# Patient Record
Sex: FEMALE | Race: WHITE | Employment: PART TIME | URBAN - METROPOLITAN AREA
[De-identification: names, ages, dates, MRNs, and addresses within clinical notes are randomized per-mention and may not be internally consistent; named-entity substitution may affect disease eponyms.]

---

## 2017-01-09 RX ORDER — TOPIRAMATE 100 MG/1
100 CAPSULE, EXTENDED RELEASE ORAL DAILY
Qty: 90 CAPSULE | Refills: 3 | Status: SHIPPED | OUTPATIENT
Start: 2017-01-09 | End: 2017-01-26

## 2017-01-24 ENCOUNTER — PATIENT MESSAGE (OUTPATIENT)
Dept: FAMILY MEDICINE CLINIC | Age: 57
End: 2017-01-24

## 2017-01-26 DIAGNOSIS — G43.009 MIGRAINE WITHOUT AURA AND WITHOUT STATUS MIGRAINOSUS, NOT INTRACTABLE: ICD-10-CM

## 2017-01-26 RX ORDER — TOPIRAMATE 100 MG/1
100 TABLET, FILM COATED ORAL DAILY
Qty: 30 TABLET | Refills: 5 | Status: SHIPPED | OUTPATIENT
Start: 2017-01-26 | End: 2017-06-20 | Stop reason: SDUPTHER

## 2017-01-27 RX ORDER — LEVOTHYROXINE SODIUM 0.05 MG/1
TABLET ORAL
Qty: 90 TABLET | Refills: 1 | Status: SHIPPED | OUTPATIENT
Start: 2017-01-27 | End: 2017-10-02 | Stop reason: SDUPTHER

## 2017-01-27 RX ORDER — TRAMADOL HYDROCHLORIDE 50 MG/1
TABLET ORAL
Qty: 90 TABLET | Refills: 0 | OUTPATIENT
Start: 2017-01-27

## 2017-04-24 RX ORDER — PROPRANOLOL HCL 60 MG
CAPSULE, EXTENDED RELEASE 24HR ORAL
Qty: 90 CAPSULE | Refills: 0 | Status: SHIPPED | OUTPATIENT
Start: 2017-04-24 | End: 2017-10-04 | Stop reason: SDUPTHER

## 2017-05-12 RX ORDER — SUMATRIPTAN 100 MG/1
TABLET, FILM COATED ORAL
Qty: 9 TABLET | Refills: 3 | Status: SHIPPED | OUTPATIENT
Start: 2017-05-12 | End: 2017-10-04 | Stop reason: SDUPTHER

## 2017-05-18 ENCOUNTER — OFFICE VISIT (OUTPATIENT)
Dept: FAMILY MEDICINE CLINIC | Age: 57
End: 2017-05-18

## 2017-05-18 VITALS
OXYGEN SATURATION: 96 % | BODY MASS INDEX: 38.45 KG/M2 | DIASTOLIC BLOOD PRESSURE: 75 MMHG | WEIGHT: 245 LBS | RESPIRATION RATE: 16 BRPM | HEART RATE: 87 BPM | HEIGHT: 67 IN | SYSTOLIC BLOOD PRESSURE: 133 MMHG | TEMPERATURE: 97 F

## 2017-05-18 DIAGNOSIS — J20.9 BRONCHOSPASM WITH BRONCHITIS, ACUTE: Primary | ICD-10-CM

## 2017-05-18 DIAGNOSIS — H65.192 OTITIS MEDIA, ACUTE NONSUPPURATIVE, LEFT: ICD-10-CM

## 2017-05-18 PROCEDURE — 99213 OFFICE O/P EST LOW 20 MIN: CPT | Performed by: FAMILY MEDICINE

## 2017-05-18 RX ORDER — ALBUTEROL SULFATE 90 UG/1
2 AEROSOL, METERED RESPIRATORY (INHALATION) EVERY 4 HOURS PRN
Qty: 1 INHALER | Refills: 0 | Status: SHIPPED | OUTPATIENT
Start: 2017-05-18 | End: 2017-07-01 | Stop reason: SDUPTHER

## 2017-05-18 RX ORDER — BENZONATATE 200 MG/1
200 CAPSULE ORAL 3 TIMES DAILY PRN
Qty: 30 CAPSULE | Refills: 0 | Status: SHIPPED | OUTPATIENT
Start: 2017-05-18 | End: 2017-05-25

## 2017-05-18 RX ORDER — AZITHROMYCIN 250 MG/1
TABLET, FILM COATED ORAL
Qty: 1 PACKET | Refills: 0 | Status: SHIPPED | OUTPATIENT
Start: 2017-05-18 | End: 2017-05-28

## 2017-05-18 ASSESSMENT — PATIENT HEALTH QUESTIONNAIRE - PHQ9
2. FEELING DOWN, DEPRESSED OR HOPELESS: 0
SUM OF ALL RESPONSES TO PHQ9 QUESTIONS 1 & 2: 0
1. LITTLE INTEREST OR PLEASURE IN DOING THINGS: 0
SUM OF ALL RESPONSES TO PHQ QUESTIONS 1-9: 0

## 2017-05-23 ENCOUNTER — TELEPHONE (OUTPATIENT)
Dept: FAMILY MEDICINE CLINIC | Age: 57
End: 2017-05-23

## 2017-05-23 ENCOUNTER — OFFICE VISIT (OUTPATIENT)
Dept: FAMILY MEDICINE CLINIC | Age: 57
End: 2017-05-23

## 2017-05-23 VITALS
HEART RATE: 72 BPM | WEIGHT: 245 LBS | OXYGEN SATURATION: 94 % | BODY MASS INDEX: 38.95 KG/M2 | DIASTOLIC BLOOD PRESSURE: 76 MMHG | SYSTOLIC BLOOD PRESSURE: 132 MMHG | TEMPERATURE: 97.2 F | RESPIRATION RATE: 20 BRPM

## 2017-05-23 DIAGNOSIS — H65.02 ACUTE SEROUS OTITIS MEDIA OF LEFT EAR, RECURRENCE NOT SPECIFIED: ICD-10-CM

## 2017-05-23 DIAGNOSIS — H69.83 ETD (EUSTACHIAN TUBE DYSFUNCTION), BILATERAL: ICD-10-CM

## 2017-05-23 DIAGNOSIS — J20.9 ACUTE BRONCHITIS, UNSPECIFIED ORGANISM: Primary | ICD-10-CM

## 2017-05-23 DIAGNOSIS — H93.8X3 EAR FULLNESS, BILATERAL: ICD-10-CM

## 2017-05-23 PROCEDURE — 99214 OFFICE O/P EST MOD 30 MIN: CPT | Performed by: FAMILY MEDICINE

## 2017-05-23 RX ORDER — PREDNISONE 20 MG/1
TABLET ORAL
Qty: 18 TABLET | Refills: 0 | Status: SHIPPED | OUTPATIENT
Start: 2017-05-23 | End: 2017-06-02

## 2017-05-23 RX ORDER — AMOXICILLIN AND CLAVULANATE POTASSIUM 875; 125 MG/1; MG/1
1 TABLET, FILM COATED ORAL 2 TIMES DAILY
Qty: 20 TABLET | Refills: 0 | Status: SHIPPED | OUTPATIENT
Start: 2017-05-23 | End: 2017-06-02

## 2017-05-23 RX ORDER — FLUCONAZOLE 150 MG/1
150 TABLET ORAL ONCE
Qty: 1 TABLET | Refills: 0 | Status: SHIPPED | OUTPATIENT
Start: 2017-05-23 | End: 2017-05-23

## 2017-05-31 ENCOUNTER — TELEPHONE (OUTPATIENT)
Dept: FAMILY MEDICINE CLINIC | Age: 57
End: 2017-05-31

## 2017-06-26 ENCOUNTER — OFFICE VISIT (OUTPATIENT)
Dept: FAMILY MEDICINE CLINIC | Age: 57
End: 2017-06-26

## 2017-06-26 VITALS
OXYGEN SATURATION: 95 % | DIASTOLIC BLOOD PRESSURE: 73 MMHG | SYSTOLIC BLOOD PRESSURE: 133 MMHG | WEIGHT: 244.6 LBS | BODY MASS INDEX: 38.89 KG/M2 | TEMPERATURE: 97.8 F | HEART RATE: 78 BPM | RESPIRATION RATE: 16 BRPM

## 2017-06-26 DIAGNOSIS — R09.89 CHEST CONGESTION: ICD-10-CM

## 2017-06-26 DIAGNOSIS — J30.2 SEASONAL ALLERGIC RHINITIS, UNSPECIFIED ALLERGIC RHINITIS TRIGGER: ICD-10-CM

## 2017-06-26 DIAGNOSIS — H93.8X2 EAR FULLNESS, LEFT: ICD-10-CM

## 2017-06-26 DIAGNOSIS — J45.21 RAD (REACTIVE AIRWAY DISEASE), MILD INTERMITTENT, WITH ACUTE EXACERBATION: ICD-10-CM

## 2017-06-26 DIAGNOSIS — J01.01 ACUTE RECURRENT MAXILLARY SINUSITIS: Primary | ICD-10-CM

## 2017-06-26 PROCEDURE — 99214 OFFICE O/P EST MOD 30 MIN: CPT | Performed by: FAMILY MEDICINE

## 2017-06-26 RX ORDER — LEVOFLOXACIN 500 MG/1
500 TABLET, FILM COATED ORAL DAILY
Qty: 20 TABLET | Refills: 0 | Status: SHIPPED | OUTPATIENT
Start: 2017-06-26 | End: 2017-07-16

## 2017-07-01 DIAGNOSIS — J20.9 BRONCHOSPASM WITH BRONCHITIS, ACUTE: ICD-10-CM

## 2017-09-28 ENCOUNTER — OFFICE VISIT (OUTPATIENT)
Dept: FAMILY MEDICINE CLINIC | Age: 57
End: 2017-09-28

## 2017-09-28 VITALS
HEIGHT: 66 IN | OXYGEN SATURATION: 97 % | SYSTOLIC BLOOD PRESSURE: 132 MMHG | TEMPERATURE: 97.3 F | HEART RATE: 73 BPM | DIASTOLIC BLOOD PRESSURE: 70 MMHG | WEIGHT: 243 LBS | BODY MASS INDEX: 39.05 KG/M2

## 2017-09-28 DIAGNOSIS — F33.42 RECURRENT MAJOR DEPRESSIVE DISORDER, IN FULL REMISSION (HCC): ICD-10-CM

## 2017-09-28 DIAGNOSIS — G43.009 MIGRAINE WITHOUT AURA AND WITHOUT STATUS MIGRAINOSUS, NOT INTRACTABLE: ICD-10-CM

## 2017-09-28 DIAGNOSIS — Z00.00 ROUTINE GENERAL MEDICAL EXAMINATION AT A HEALTH CARE FACILITY: Primary | ICD-10-CM

## 2017-09-28 DIAGNOSIS — K21.9 GASTROESOPHAGEAL REFLUX DISEASE, ESOPHAGITIS PRESENCE NOT SPECIFIED: ICD-10-CM

## 2017-09-28 DIAGNOSIS — G89.29 CHRONIC LEFT SHOULDER PAIN: ICD-10-CM

## 2017-09-28 DIAGNOSIS — E78.00 HYPERCHOLESTEREMIA: ICD-10-CM

## 2017-09-28 DIAGNOSIS — E03.9 HYPOTHYROIDISM, UNSPECIFIED TYPE: ICD-10-CM

## 2017-09-28 DIAGNOSIS — R60.0 LOCALIZED EDEMA: ICD-10-CM

## 2017-09-28 DIAGNOSIS — R73.9 HYPERGLYCEMIA: ICD-10-CM

## 2017-09-28 DIAGNOSIS — N39.3 STRESS INCONTINENCE: ICD-10-CM

## 2017-09-28 DIAGNOSIS — Z12.39 SCREENING FOR BREAST CANCER: ICD-10-CM

## 2017-09-28 DIAGNOSIS — Z23 NEED FOR PNEUMOCOCCAL VACCINATION: ICD-10-CM

## 2017-09-28 DIAGNOSIS — M25.512 CHRONIC LEFT SHOULDER PAIN: ICD-10-CM

## 2017-09-28 DIAGNOSIS — J45.991 COUGH VARIANT ASTHMA: ICD-10-CM

## 2017-09-28 LAB
A/G RATIO: 1.7 (ref 1.1–2.2)
ALBUMIN SERPL-MCNC: 4.5 G/DL (ref 3.4–5)
ALP BLD-CCNC: 73 U/L (ref 40–129)
ALT SERPL-CCNC: 22 U/L (ref 10–40)
ANION GAP SERPL CALCULATED.3IONS-SCNC: 20 MMOL/L (ref 3–16)
AST SERPL-CCNC: 22 U/L (ref 15–37)
BILIRUB SERPL-MCNC: 0.6 MG/DL (ref 0–1)
BUN BLDV-MCNC: 11 MG/DL (ref 7–20)
CALCIUM SERPL-MCNC: 9.9 MG/DL (ref 8.3–10.6)
CHLORIDE BLD-SCNC: 100 MMOL/L (ref 99–110)
CHOLESTEROL, TOTAL: 168 MG/DL (ref 0–199)
CO2: 21 MMOL/L (ref 21–32)
CREAT SERPL-MCNC: 1 MG/DL (ref 0.6–1.1)
GFR AFRICAN AMERICAN: >60
GFR NON-AFRICAN AMERICAN: 57
GLOBULIN: 2.7 G/DL
GLUCOSE BLD-MCNC: 107 MG/DL (ref 70–99)
HCT VFR BLD CALC: 45.6 % (ref 36–48)
HDLC SERPL-MCNC: 64 MG/DL (ref 40–60)
HEMOGLOBIN: 15.1 G/DL (ref 12–16)
LDL CHOLESTEROL CALCULATED: 88 MG/DL
MCH RBC QN AUTO: 30.8 PG (ref 26–34)
MCHC RBC AUTO-ENTMCNC: 33 G/DL (ref 31–36)
MCV RBC AUTO: 93.4 FL (ref 80–100)
PDW BLD-RTO: 13.9 % (ref 12.4–15.4)
PLATELET # BLD: 189 K/UL (ref 135–450)
PMV BLD AUTO: 11.1 FL (ref 5–10.5)
POTASSIUM SERPL-SCNC: 3.2 MMOL/L (ref 3.5–5.1)
RBC # BLD: 4.88 M/UL (ref 4–5.2)
SODIUM BLD-SCNC: 141 MMOL/L (ref 136–145)
T4 FREE: 1.4 NG/DL (ref 0.9–1.8)
TOTAL PROTEIN: 7.2 G/DL (ref 6.4–8.2)
TRIGL SERPL-MCNC: 82 MG/DL (ref 0–150)
TSH SERPL DL<=0.05 MIU/L-ACNC: 1.45 UIU/ML (ref 0.27–4.2)
VLDLC SERPL CALC-MCNC: 16 MG/DL
WBC # BLD: 8.1 K/UL (ref 4–11)

## 2017-09-28 PROCEDURE — 99396 PREV VISIT EST AGE 40-64: CPT | Performed by: FAMILY MEDICINE

## 2017-09-28 PROCEDURE — 90732 PPSV23 VACC 2 YRS+ SUBQ/IM: CPT | Performed by: FAMILY MEDICINE

## 2017-09-28 PROCEDURE — 90471 IMMUNIZATION ADMIN: CPT | Performed by: FAMILY MEDICINE

## 2017-09-28 RX ORDER — TRAMADOL HYDROCHLORIDE 50 MG/1
TABLET ORAL
Qty: 90 TABLET | Refills: 0 | Status: SHIPPED | OUTPATIENT
Start: 2017-09-28 | End: 2019-04-24

## 2017-09-28 RX ORDER — MOMETASONE FUROATE AND FORMOTEROL FUMARATE DIHYDRATE 200; 5 UG/1; UG/1
2 AEROSOL RESPIRATORY (INHALATION) 2 TIMES DAILY
COMMUNITY
Start: 2017-09-25 | End: 2019-04-24

## 2017-09-28 RX ORDER — CALCIUM CARBONATE 300MG(750)
1 TABLET,CHEWABLE ORAL DAILY
COMMUNITY
End: 2017-11-30

## 2017-09-28 RX ORDER — MONTELUKAST SODIUM 10 MG/1
10 TABLET ORAL DAILY
COMMUNITY
Start: 2017-09-12 | End: 2020-04-27 | Stop reason: SDUPTHER

## 2017-09-29 LAB
ESTIMATED AVERAGE GLUCOSE: 131.2 MG/DL
HBA1C MFR BLD: 6.2 %

## 2017-10-02 ENCOUNTER — PATIENT MESSAGE (OUTPATIENT)
Dept: FAMILY MEDICINE CLINIC | Age: 57
End: 2017-10-02

## 2017-10-02 DIAGNOSIS — J20.9 BRONCHOSPASM WITH BRONCHITIS, ACUTE: ICD-10-CM

## 2017-10-02 RX ORDER — POTASSIUM CHLORIDE 750 MG/1
TABLET, FILM COATED, EXTENDED RELEASE ORAL
Qty: 180 TABLET | Refills: 3 | Status: SHIPPED | OUTPATIENT
Start: 2017-10-02 | End: 2018-12-20 | Stop reason: SDUPTHER

## 2017-10-02 RX ORDER — LEVOTHYROXINE SODIUM 0.05 MG/1
TABLET ORAL
Qty: 90 TABLET | Refills: 3 | Status: SHIPPED | OUTPATIENT
Start: 2017-10-02 | End: 2019-12-06 | Stop reason: SDUPTHER

## 2017-10-02 RX ORDER — ALBUTEROL SULFATE 90 UG/1
AEROSOL, METERED RESPIRATORY (INHALATION)
Qty: 3 INHALER | Refills: 3 | Status: SHIPPED | OUTPATIENT
Start: 2017-10-02 | End: 2020-06-30

## 2017-10-05 RX ORDER — NORETHINDRONE ACETATE AND ETHINYL ESTRADIOL AND FERROUS FUMARATE 1MG-20(21)
KIT ORAL
Qty: 84 TABLET | Refills: 3 | Status: SHIPPED | OUTPATIENT
Start: 2017-10-05 | End: 2018-09-02 | Stop reason: SDUPTHER

## 2017-10-05 RX ORDER — PROPRANOLOL HCL 60 MG
CAPSULE, EXTENDED RELEASE 24HR ORAL
Qty: 90 CAPSULE | Refills: 1 | Status: SHIPPED | OUTPATIENT
Start: 2017-10-05 | End: 2017-11-30

## 2017-10-05 RX ORDER — SUMATRIPTAN 100 MG/1
TABLET, FILM COATED ORAL
Qty: 9 TABLET | Refills: 3 | Status: SHIPPED | OUTPATIENT
Start: 2017-10-05 | End: 2018-01-25 | Stop reason: SDUPTHER

## 2017-10-05 RX ORDER — LEVOTHYROXINE SODIUM 0.05 MG/1
TABLET ORAL
Qty: 90 TABLET | Refills: 1 | Status: SHIPPED | OUTPATIENT
Start: 2017-10-05 | End: 2019-04-24

## 2017-10-05 RX ORDER — FUROSEMIDE 20 MG/1
TABLET ORAL
Qty: 60 TABLET | Refills: 5 | Status: SHIPPED | OUTPATIENT
Start: 2017-10-05 | End: 2018-04-07 | Stop reason: SDUPTHER

## 2017-11-30 ENCOUNTER — OFFICE VISIT (OUTPATIENT)
Dept: FAMILY MEDICINE CLINIC | Age: 57
End: 2017-11-30

## 2017-11-30 VITALS
DIASTOLIC BLOOD PRESSURE: 86 MMHG | HEART RATE: 95 BPM | BODY MASS INDEX: 37.96 KG/M2 | RESPIRATION RATE: 14 BRPM | OXYGEN SATURATION: 96 % | WEIGHT: 236.2 LBS | SYSTOLIC BLOOD PRESSURE: 128 MMHG | TEMPERATURE: 97.8 F | HEIGHT: 66 IN

## 2017-11-30 DIAGNOSIS — E03.9 HYPOTHYROIDISM, UNSPECIFIED TYPE: ICD-10-CM

## 2017-11-30 DIAGNOSIS — F33.42 RECURRENT MAJOR DEPRESSIVE DISORDER, IN FULL REMISSION (HCC): ICD-10-CM

## 2017-11-30 DIAGNOSIS — R25.2 LEG CRAMPS: ICD-10-CM

## 2017-11-30 DIAGNOSIS — S43.432A SUPERIOR GLENOID LABRUM LESION OF LEFT SHOULDER, INITIAL ENCOUNTER: ICD-10-CM

## 2017-11-30 DIAGNOSIS — J45.991 COUGH VARIANT ASTHMA: ICD-10-CM

## 2017-11-30 DIAGNOSIS — Z01.818 PREOP GENERAL PHYSICAL EXAM: Primary | ICD-10-CM

## 2017-11-30 DIAGNOSIS — M75.122 COMPLETE TEAR OF LEFT ROTATOR CUFF: ICD-10-CM

## 2017-11-30 PROCEDURE — 3014F SCREEN MAMMO DOC REV: CPT | Performed by: FAMILY MEDICINE

## 2017-11-30 PROCEDURE — G8427 DOCREV CUR MEDS BY ELIG CLIN: HCPCS | Performed by: FAMILY MEDICINE

## 2017-11-30 PROCEDURE — 99214 OFFICE O/P EST MOD 30 MIN: CPT | Performed by: FAMILY MEDICINE

## 2017-11-30 PROCEDURE — 3017F COLORECTAL CA SCREEN DOC REV: CPT | Performed by: FAMILY MEDICINE

## 2017-11-30 PROCEDURE — G8484 FLU IMMUNIZE NO ADMIN: HCPCS | Performed by: FAMILY MEDICINE

## 2017-11-30 PROCEDURE — G8417 CALC BMI ABV UP PARAM F/U: HCPCS | Performed by: FAMILY MEDICINE

## 2017-11-30 NOTE — PROGRESS NOTES
Subjective:    Chief Complaint:     Db Seth is a 62 y.o. female who presents for a preoperative physical examination. She is scheduled to have L shoulder surgery  done by Dr. Evin Kerr at [facility] center on 12/22/2017. Pt has had some chronic wear and tear to shoulder but did have fall about 6 years ago while at work. Pt was evaluated at that time and had hairline fracture and small tear. Pt had limited range of motion at that time and improved with physical therapy. Pt states since then has noted some increased issues related to chronic wear and tear. Pt did see ortho due to persistent sx and had MRI showing full thickness rotator cuff tear and labral tear. Due to findings and restriction in range of motion, as well as pain, is set for surgery. Pt will be off for a few weeks, will be in sling for 6 weeks. Pt will see how she progresses to determine how long she will be out of work, limited. Work does offer light duty doing phone calls. Pt states overall has been doing well. Pt is feeling well, denies any issues of chest pain, shortness of breath. Pt has been dealing with some issues of increased stress, is coping ok overall. Pt denies any chest pain, shortness of breath. Pt is in the process of working on improving lifestyle, with improvement in diet, but not as consistent with exercise. Pt is in the process of getting started, although limited by shoulder pain issues.     History of Present Illness:          Past Medical History:   Diagnosis Date    Allergic rhinitis, cause unspecified     Attention deficit disorder without mention of hyperactivity 8/30/2011    Cough variant asthma     Cough variant asthma     Depression     Edema     HSV-1 infection     Hypercholesteremia     Hypothyroidism     Lumbago     Migraine headache     Posterior vitreous detachment of left eye     Routine gynecological examination     Stress incontinence 9/28/2017      chroinc shoulder

## 2017-12-10 ENCOUNTER — PATIENT MESSAGE (OUTPATIENT)
Dept: FAMILY MEDICINE CLINIC | Age: 57
End: 2017-12-10

## 2017-12-11 NOTE — TELEPHONE ENCOUNTER
From: David Galloway  To: Citlaly Herrera MD  Sent: 12/10/2017 9:25 PM EST  Subject: Visit Follow-Up Question    This is for the office staff: I had my Pre-op Physical on 11/30/17. I was to take a copy of the physical and lab work with me on day of surgery as well. Someone at the office was going to mail it, but I havent received it yet. My , Yuridia Chaves will be in the office on Tuesday, 12/12, and I would appreciate it if you would have the copies ready for him to  on my behalf. Thanks!

## 2017-12-17 DIAGNOSIS — E78.00 HYPERCHOLESTEREMIA: ICD-10-CM

## 2017-12-18 RX ORDER — ATORVASTATIN CALCIUM 20 MG/1
TABLET, FILM COATED ORAL
Qty: 90 TABLET | Refills: 3 | Status: SHIPPED | OUTPATIENT
Start: 2017-12-18 | End: 2018-12-20 | Stop reason: SDUPTHER

## 2017-12-18 RX ORDER — TOPIRAMATE 100 MG/1
TABLET, FILM COATED ORAL
Qty: 30 TABLET | Refills: 5 | Status: SHIPPED | OUTPATIENT
Start: 2017-12-18 | End: 2018-09-02 | Stop reason: SDUPTHER

## 2018-04-05 ENCOUNTER — PATIENT MESSAGE (OUTPATIENT)
Dept: FAMILY MEDICINE CLINIC | Age: 58
End: 2018-04-05

## 2018-04-05 DIAGNOSIS — R25.2 LEG CRAMP: ICD-10-CM

## 2018-04-05 DIAGNOSIS — E03.9 HYPOTHYROIDISM, UNSPECIFIED TYPE: Primary | ICD-10-CM

## 2018-04-05 DIAGNOSIS — R73.9 HYPERGLYCEMIA: ICD-10-CM

## 2018-04-09 RX ORDER — FUROSEMIDE 20 MG/1
TABLET ORAL
Qty: 60 TABLET | Refills: 5 | Status: SHIPPED | OUTPATIENT
Start: 2018-04-09 | End: 2018-10-10 | Stop reason: SDUPTHER

## 2018-04-10 DIAGNOSIS — E03.9 HYPOTHYROIDISM, UNSPECIFIED TYPE: ICD-10-CM

## 2018-04-10 DIAGNOSIS — R73.9 HYPERGLYCEMIA: ICD-10-CM

## 2018-04-10 DIAGNOSIS — R25.2 LEG CRAMP: ICD-10-CM

## 2018-04-10 LAB
A/G RATIO: 1.8 (ref 1.1–2.2)
ALBUMIN SERPL-MCNC: 4.4 G/DL (ref 3.4–5)
ALP BLD-CCNC: 64 U/L (ref 40–129)
ALT SERPL-CCNC: 31 U/L (ref 10–40)
ANION GAP SERPL CALCULATED.3IONS-SCNC: 15 MMOL/L (ref 3–16)
AST SERPL-CCNC: 28 U/L (ref 15–37)
BILIRUB SERPL-MCNC: 0.3 MG/DL (ref 0–1)
BUN BLDV-MCNC: 20 MG/DL (ref 7–20)
CALCIUM SERPL-MCNC: 9.4 MG/DL (ref 8.3–10.6)
CHLORIDE BLD-SCNC: 102 MMOL/L (ref 99–110)
CO2: 23 MMOL/L (ref 21–32)
CREAT SERPL-MCNC: 0.7 MG/DL (ref 0.6–1.1)
GFR AFRICAN AMERICAN: >60
GFR NON-AFRICAN AMERICAN: >60
GLOBULIN: 2.5 G/DL
GLUCOSE BLD-MCNC: 102 MG/DL (ref 70–99)
MAGNESIUM: 2.4 MG/DL (ref 1.8–2.4)
PHOSPHORUS: 3.2 MG/DL (ref 2.5–4.9)
POTASSIUM SERPL-SCNC: 4.3 MMOL/L (ref 3.5–5.1)
SODIUM BLD-SCNC: 140 MMOL/L (ref 136–145)
T4 FREE: 1.1 NG/DL (ref 0.9–1.8)
TOTAL PROTEIN: 6.9 G/DL (ref 6.4–8.2)
TSH SERPL DL<=0.05 MIU/L-ACNC: 2 UIU/ML (ref 0.27–4.2)

## 2018-04-11 LAB
ESTIMATED AVERAGE GLUCOSE: 108.3 MG/DL
HBA1C MFR BLD: 5.4 %

## 2018-07-18 RX ORDER — SUMATRIPTAN 100 MG/1
TABLET, FILM COATED ORAL
Qty: 9 TABLET | Refills: 5 | Status: SHIPPED | OUTPATIENT
Start: 2018-07-18 | End: 2018-12-18 | Stop reason: SDUPTHER

## 2018-09-04 RX ORDER — NORETHINDRONE ACETATE AND ETHINYL ESTRADIOL AND FERROUS FUMARATE 1MG-20(21)
KIT ORAL
Qty: 84 TABLET | Refills: 3 | Status: SHIPPED | OUTPATIENT
Start: 2018-09-04 | End: 2019-08-13 | Stop reason: SDUPTHER

## 2018-09-04 RX ORDER — TOPIRAMATE 100 MG/1
TABLET, FILM COATED ORAL
Qty: 30 TABLET | Refills: 5 | Status: SHIPPED | OUTPATIENT
Start: 2018-09-04 | End: 2019-03-13 | Stop reason: SDUPTHER

## 2018-10-10 RX ORDER — FUROSEMIDE 20 MG/1
TABLET ORAL
Qty: 60 TABLET | Refills: 5 | Status: SHIPPED | OUTPATIENT
Start: 2018-10-10 | End: 2019-02-25 | Stop reason: SDUPTHER

## 2018-10-10 RX ORDER — POTASSIUM CHLORIDE 750 MG/1
TABLET, EXTENDED RELEASE ORAL
Qty: 90 TABLET | Refills: 1 | Status: SHIPPED | OUTPATIENT
Start: 2018-10-10 | End: 2019-04-24

## 2018-11-19 RX ORDER — LEVOTHYROXINE SODIUM 0.05 MG/1
TABLET ORAL
Qty: 90 TABLET | Refills: 3 | Status: SHIPPED | OUTPATIENT
Start: 2018-11-19 | End: 2019-04-24

## 2018-12-18 RX ORDER — SUMATRIPTAN 100 MG/1
TABLET, FILM COATED ORAL
Qty: 9 TABLET | Refills: 0 | Status: SHIPPED | OUTPATIENT
Start: 2018-12-18 | End: 2019-01-05 | Stop reason: SDUPTHER

## 2018-12-20 DIAGNOSIS — E78.00 HYPERCHOLESTEREMIA: ICD-10-CM

## 2018-12-24 RX ORDER — ATORVASTATIN CALCIUM 20 MG/1
TABLET, FILM COATED ORAL
Qty: 90 TABLET | Refills: 0 | Status: SHIPPED | OUTPATIENT
Start: 2018-12-24 | End: 2019-07-17 | Stop reason: SDUPTHER

## 2018-12-24 RX ORDER — POTASSIUM CHLORIDE 750 MG/1
TABLET, FILM COATED, EXTENDED RELEASE ORAL
Qty: 180 TABLET | Refills: 0 | Status: SHIPPED | OUTPATIENT
Start: 2018-12-24 | End: 2020-04-27 | Stop reason: SDUPTHER

## 2019-01-05 ENCOUNTER — TELEPHONE (OUTPATIENT)
Dept: FAMILY MEDICINE CLINIC | Age: 59
End: 2019-01-05

## 2019-01-05 DIAGNOSIS — G43.009 MIGRAINE WITHOUT AURA AND WITHOUT STATUS MIGRAINOSUS, NOT INTRACTABLE: Primary | ICD-10-CM

## 2019-01-05 RX ORDER — SUMATRIPTAN 100 MG/1
TABLET, FILM COATED ORAL
Qty: 9 TABLET | Refills: 1 | Status: SHIPPED | OUTPATIENT
Start: 2019-01-05 | End: 2019-02-25 | Stop reason: SDUPTHER

## 2019-02-25 ENCOUNTER — TELEPHONE (OUTPATIENT)
Dept: FAMILY MEDICINE CLINIC | Age: 59
End: 2019-02-25

## 2019-02-25 DIAGNOSIS — G43.009 MIGRAINE WITHOUT AURA AND WITHOUT STATUS MIGRAINOSUS, NOT INTRACTABLE: ICD-10-CM

## 2019-02-25 RX ORDER — SUMATRIPTAN 100 MG/1
TABLET, FILM COATED ORAL
Qty: 9 TABLET | Refills: 5 | Status: SHIPPED | OUTPATIENT
Start: 2019-02-25 | End: 2019-08-13 | Stop reason: SDUPTHER

## 2019-02-25 RX ORDER — FUROSEMIDE 20 MG/1
TABLET ORAL
Qty: 180 TABLET | Refills: 1 | Status: SHIPPED | OUTPATIENT
Start: 2019-02-25 | End: 2019-08-13 | Stop reason: SDUPTHER

## 2019-03-13 RX ORDER — TOPIRAMATE 100 MG/1
TABLET, FILM COATED ORAL
Qty: 30 TABLET | Refills: 2 | Status: SHIPPED | OUTPATIENT
Start: 2019-03-13 | End: 2019-08-13 | Stop reason: SDUPTHER

## 2019-04-24 ENCOUNTER — OFFICE VISIT (OUTPATIENT)
Dept: FAMILY MEDICINE CLINIC | Age: 59
End: 2019-04-24
Payer: COMMERCIAL

## 2019-04-24 VITALS
RESPIRATION RATE: 8 BRPM | HEIGHT: 66 IN | OXYGEN SATURATION: 96 % | BODY MASS INDEX: 29.57 KG/M2 | DIASTOLIC BLOOD PRESSURE: 80 MMHG | HEART RATE: 74 BPM | TEMPERATURE: 97.8 F | SYSTOLIC BLOOD PRESSURE: 117 MMHG | WEIGHT: 184 LBS

## 2019-04-24 DIAGNOSIS — F43.9 STRESS: ICD-10-CM

## 2019-04-24 DIAGNOSIS — R63.4 WEIGHT LOSS: ICD-10-CM

## 2019-04-24 DIAGNOSIS — J45.991 COUGH VARIANT ASTHMA: ICD-10-CM

## 2019-04-24 DIAGNOSIS — Z00.00 ROUTINE GENERAL MEDICAL EXAMINATION AT A HEALTH CARE FACILITY: Primary | ICD-10-CM

## 2019-04-24 DIAGNOSIS — F33.42 RECURRENT MAJOR DEPRESSIVE DISORDER, IN FULL REMISSION (HCC): ICD-10-CM

## 2019-04-24 DIAGNOSIS — E78.00 HYPERCHOLESTEREMIA: ICD-10-CM

## 2019-04-24 DIAGNOSIS — G89.29 CHRONIC BILATERAL LOW BACK PAIN WITHOUT SCIATICA: ICD-10-CM

## 2019-04-24 DIAGNOSIS — Z00.00 ROUTINE GENERAL MEDICAL EXAMINATION AT A HEALTH CARE FACILITY: ICD-10-CM

## 2019-04-24 DIAGNOSIS — Z12.39 SCREENING FOR BREAST CANCER: ICD-10-CM

## 2019-04-24 DIAGNOSIS — Z12.11 SCREEN FOR COLON CANCER: ICD-10-CM

## 2019-04-24 DIAGNOSIS — R73.9 HYPERGLYCEMIA: ICD-10-CM

## 2019-04-24 DIAGNOSIS — J30.2 SEASONAL ALLERGIC RHINITIS, UNSPECIFIED TRIGGER: ICD-10-CM

## 2019-04-24 DIAGNOSIS — M25.551 RIGHT HIP PAIN: ICD-10-CM

## 2019-04-24 DIAGNOSIS — E03.9 HYPOTHYROIDISM, UNSPECIFIED TYPE: ICD-10-CM

## 2019-04-24 DIAGNOSIS — M54.50 CHRONIC BILATERAL LOW BACK PAIN WITHOUT SCIATICA: ICD-10-CM

## 2019-04-24 LAB
A/G RATIO: 1.4 (ref 1.1–2.2)
ALBUMIN SERPL-MCNC: 4.3 G/DL (ref 3.4–5)
ALP BLD-CCNC: 57 U/L (ref 40–129)
ALT SERPL-CCNC: 23 U/L (ref 10–40)
ANION GAP SERPL CALCULATED.3IONS-SCNC: 14 MMOL/L (ref 3–16)
AST SERPL-CCNC: 23 U/L (ref 15–37)
BILIRUB SERPL-MCNC: 0.6 MG/DL (ref 0–1)
BUN BLDV-MCNC: 26 MG/DL (ref 7–20)
CALCIUM SERPL-MCNC: 9.7 MG/DL (ref 8.3–10.6)
CHLORIDE BLD-SCNC: 101 MMOL/L (ref 99–110)
CHOLESTEROL, TOTAL: 167 MG/DL (ref 0–199)
CO2: 26 MMOL/L (ref 21–32)
CREAT SERPL-MCNC: 0.8 MG/DL (ref 0.6–1.1)
GFR AFRICAN AMERICAN: >60
GFR NON-AFRICAN AMERICAN: >60
GLOBULIN: 3 G/DL
GLUCOSE BLD-MCNC: 91 MG/DL (ref 70–99)
HCT VFR BLD CALC: 46.1 % (ref 36–48)
HDLC SERPL-MCNC: 61 MG/DL (ref 40–60)
HEMOGLOBIN: 15.6 G/DL (ref 12–16)
LDL CHOLESTEROL CALCULATED: 94 MG/DL
MCH RBC QN AUTO: 32 PG (ref 26–34)
MCHC RBC AUTO-ENTMCNC: 33.8 G/DL (ref 31–36)
MCV RBC AUTO: 94.6 FL (ref 80–100)
PDW BLD-RTO: 13.8 % (ref 12.4–15.4)
PLATELET # BLD: 189 K/UL (ref 135–450)
PMV BLD AUTO: 10.5 FL (ref 5–10.5)
POTASSIUM SERPL-SCNC: 4.1 MMOL/L (ref 3.5–5.1)
RBC # BLD: 4.87 M/UL (ref 4–5.2)
SODIUM BLD-SCNC: 141 MMOL/L (ref 136–145)
T4 FREE: 1.4 NG/DL (ref 0.9–1.8)
TOTAL PROTEIN: 7.3 G/DL (ref 6.4–8.2)
TRIGL SERPL-MCNC: 60 MG/DL (ref 0–150)
TSH SERPL DL<=0.05 MIU/L-ACNC: 1.19 UIU/ML (ref 0.27–4.2)
VLDLC SERPL CALC-MCNC: 12 MG/DL
WBC # BLD: 5.7 K/UL (ref 4–11)

## 2019-04-24 PROCEDURE — 99396 PREV VISIT EST AGE 40-64: CPT | Performed by: FAMILY MEDICINE

## 2019-04-24 RX ORDER — CHOLECALCIFEROL (VITAMIN D3) 25 MCG
1 TABLET,CHEWABLE ORAL DAILY
COMMUNITY
End: 2020-06-30

## 2019-04-24 RX ORDER — FLUTICASONE FUROATE AND VILANTEROL 100; 25 UG/1; UG/1
1 POWDER RESPIRATORY (INHALATION) DAILY
COMMUNITY
End: 2020-06-30

## 2019-04-24 NOTE — PROGRESS NOTES
- Normocephalic. No masses, lesions, tenderness or abnormalities  Eyes - conjunctivae/corneas clear. Pupils equal and reactive to light and accomodation, extraocular muscles intact. Ears - External ears normal. Canals clear. Tympanic membranes normal bilaterally. Nose/Sinuses - Nares normal. Septum midline. Mucosa normal. No drainage or sinus tenderness. Oropharynx - Lips, mucosa, and tongue normal. Teeth and gums normal.   Neck - Neck supple. No adenopathy. Thyroid symmetric, normal size, no carotid bruit bilaterally  Back - Back symmetric, no curvature. Range of motion normal. No Costovertebral angle tenderness. Lungs - Percussion normal. Good diaphragmatic excursion. Lungs clear without wheeze, rales, crackles  Heart - Regular rate and rhythm, with no rub, murmur or gallop noted. Abdomen - Abdomen soft, non-tender. Bowel sounds normal. No masses, tenderness or organomegaly  Extremities - Extremities normal. No deformities, edema, or skin discoloration  Musculoskeletal - Spine ROM normal. Muscular strength intact. full range of motion bilateral lower extremities. Mild tender to palpation to R hip over greater trochanter. full range of motion hip. Neg straight leg-raise bilaterally. Mild tender to palpation to L lumbar paraspinal  Peripheral pulses - radial=4/4,, femoral=4/4, popliteal=4/4, dorsalis pedis=4/4,  Neuro - Gait normal. Reflexes normal and symmetric. Sensation grossly normal.  No focal weakness  Psych - euthymic, no suicidal thoughts or ideation, mood stable. Wt Readings from Last 3 Encounters:   04/24/19 184 lb (83.5 kg)   11/30/17 236 lb 3.2 oz (107.1 kg)   09/28/17 243 lb (110.2 kg)          ASSESSMENT / PLAN:    1. Routine general medical examination at a health care facility  No focal abnormalities on exam  Anticipatory guidance discussed. Check bloodwork for:  - CBC; Future  - Comprehensive Metabolic Panel; Future  - Lipid Panel; Future  - TSH without Reflex;  Future  - T4, Free; Future  - Hemoglobin A1C; Future    2. Cough variant asthma  Stable w/o flare  Cont breo and prn proventil  F/u with pulmonary prn    3. Recurrent major depressive disorder, in full remission ([address]  Moderate increased stressors as discussed above  Discussed tx options. Not interested in meds at this time, discussed ocnsideration of therapy and pt will consider   Given information for dr. Aman Tomlin    4. Hypercholesteremia  Check fasting bloodwork for: CMP, lipids    5. Hypothyroidism, unspecified type  Check TSH, free T4    6. Stress  As above  Consider counseling    7. Seasonal allergic rhinitis, unspecified trigger  Stable w/o flare    8. Screening for breast cancer  Due mammo, order given  - EMI DIGITAL SCREEN W OR WO CAD BILATERAL; Future    9. Screen for colon cancer  Due colonoscopy, aware need to get set up  Referral given to dr. Neeraj Santamaria  - External Referral To Gastroenterology    10. Hyperglycemia  Check a1c    11. Weight loss  Doing great with over 70# of weight loss with lifestyle mgt, diet/exercise  Will check bloodwork as above, check a1c    12. Chronic bilateral low back pain without sciatica  Muscular, exam nonfocal.  Monitor with range of motion exercises, Exercise handout given. Instructed on use, benefits. 13. Right hip pain  Exam and hx c/w acute bursitis  tx with range of motion exercises, NSAIDs  Exercise handout given. Instructed on use, benefits. Follow-up appointment:   prn    Discussed use, benefit, and side effects of all prescribed medications. Barriers to medication compliance addressed. All patient questions answered. Pt voiced understanding. When applicable, patient's outside records were reviewed through [facility]. The patient has signed appropriate paperworks/consents.

## 2019-04-25 LAB
ESTIMATED AVERAGE GLUCOSE: 114 MG/DL
HBA1C MFR BLD: 5.6 %

## 2019-06-03 RX ORDER — POTASSIUM CHLORIDE 750 MG/1
TABLET, EXTENDED RELEASE ORAL
Qty: 90 TABLET | Refills: 1 | Status: SHIPPED | OUTPATIENT
Start: 2019-06-03 | End: 2019-12-06 | Stop reason: SDUPTHER

## 2019-07-17 ENCOUNTER — TELEPHONE (OUTPATIENT)
Dept: FAMILY MEDICINE CLINIC | Age: 59
End: 2019-07-17

## 2019-07-17 DIAGNOSIS — E78.00 HYPERCHOLESTEREMIA: ICD-10-CM

## 2019-07-17 RX ORDER — ATORVASTATIN CALCIUM 20 MG/1
TABLET, FILM COATED ORAL
Qty: 90 TABLET | Refills: 3 | Status: SHIPPED | OUTPATIENT
Start: 2019-07-17 | End: 2020-04-27 | Stop reason: SDUPTHER

## 2019-09-04 ENCOUNTER — OFFICE VISIT (OUTPATIENT)
Dept: FAMILY MEDICINE CLINIC | Age: 59
End: 2019-09-04
Payer: COMMERCIAL

## 2019-09-04 ENCOUNTER — HOSPITAL ENCOUNTER (OUTPATIENT)
Age: 59
Discharge: HOME | End: 2019-09-04
Payer: COMMERCIAL

## 2019-09-04 ENCOUNTER — HOSPITAL ENCOUNTER (OUTPATIENT)
Dept: GENERAL RADIOLOGY | Age: 59
Discharge: HOME | End: 2019-09-04
Payer: COMMERCIAL

## 2019-09-04 VITALS
DIASTOLIC BLOOD PRESSURE: 82 MMHG | TEMPERATURE: 97.3 F | HEART RATE: 64 BPM | OXYGEN SATURATION: 98 % | SYSTOLIC BLOOD PRESSURE: 134 MMHG | RESPIRATION RATE: 16 BRPM | WEIGHT: 197.7 LBS | BODY MASS INDEX: 31.91 KG/M2

## 2019-09-04 DIAGNOSIS — F43.9 STRESS: Primary | ICD-10-CM

## 2019-09-04 DIAGNOSIS — M79.641 BILATERAL HAND PAIN: ICD-10-CM

## 2019-09-04 DIAGNOSIS — R53.83 FATIGUE, UNSPECIFIED TYPE: ICD-10-CM

## 2019-09-04 DIAGNOSIS — M54.50 CHRONIC BILATERAL LOW BACK PAIN WITHOUT SCIATICA: ICD-10-CM

## 2019-09-04 DIAGNOSIS — G89.29 CHRONIC BILATERAL LOW BACK PAIN WITHOUT SCIATICA: ICD-10-CM

## 2019-09-04 DIAGNOSIS — Z12.39 SCREENING FOR BREAST CANCER: ICD-10-CM

## 2019-09-04 DIAGNOSIS — Z12.11 SCREEN FOR COLON CANCER: ICD-10-CM

## 2019-09-04 DIAGNOSIS — R03.0 ELEVATED BLOOD PRESSURE READING: ICD-10-CM

## 2019-09-04 DIAGNOSIS — M79.642 BILATERAL HAND PAIN: ICD-10-CM

## 2019-09-04 PROCEDURE — 99214 OFFICE O/P EST MOD 30 MIN: CPT | Performed by: FAMILY MEDICINE

## 2019-09-04 PROCEDURE — 72100 X-RAY EXAM L-S SPINE 2/3 VWS: CPT

## 2019-09-04 PROCEDURE — 72072 X-RAY EXAM THORAC SPINE 3VWS: CPT

## 2019-09-17 ENCOUNTER — OFFICE VISIT (OUTPATIENT)
Dept: ORTHOPEDIC SURGERY | Age: 59
End: 2019-09-17
Payer: COMMERCIAL

## 2019-09-17 VITALS — WEIGHT: 197.75 LBS | HEIGHT: 66 IN | BODY MASS INDEX: 31.78 KG/M2 | RESPIRATION RATE: 16 BRPM

## 2019-09-17 DIAGNOSIS — M79.641 BILATERAL HAND PAIN: Primary | ICD-10-CM

## 2019-09-17 DIAGNOSIS — M18.0 ARTHRITIS OF CARPOMETACARPAL (CMC) JOINT OF BOTH THUMBS: ICD-10-CM

## 2019-09-17 DIAGNOSIS — M79.642 BILATERAL HAND PAIN: Primary | ICD-10-CM

## 2019-09-17 PROCEDURE — L3924 HFO WITHOUT JOINTS PRE OTS: HCPCS | Performed by: ORTHOPAEDIC SURGERY

## 2019-09-17 PROCEDURE — 99243 OFF/OP CNSLTJ NEW/EST LOW 30: CPT | Performed by: ORTHOPAEDIC SURGERY

## 2019-09-17 NOTE — PROGRESS NOTES
grade 3 osteoarthritis. IMPRESSION AND PLAN: Degenerative arthritis of the right more so than left thumb. We discussed this common entity and appropriate conservative and surgical options. Appropriate initial steps include activity modification, rest, splinting, hand therapy, and injection. I also recommend utilizing  modifiers that decrease thumb pinch stress. Surgical intervention can usually be reserved for longstanding recalcitrant cases. Today we will demonstrate some thumb spica bracing for her and provide braces as requested for both thumbs. We will also give her a therapy referral for some overall education and training and also thermoplastic splinting. Appropriate followup plans are discussed with the patient depending on the level of progress with the conservative care. On the road she will continue to be an excellent candidate for cortisone injection and ultimately [facility] arthroplasty. Please note that this transcription was created using voice recognition software. Any errors are unintentional and may be due to voice recognition transcription. Procedures    Hely Weber [facility] Controller Plus     Patient was prescribed a Hely Weber [facility] Controller Plus Thumb Splint. The bilateral thumb will require stabilization / immobilization from this semi-rigid / rigid orthosis to improve their function. The orthosis will assist in protecting the affected area, provide functional support and facilitate healing. The patient was educated and fit by a healthcare professional with expert knowledge and specialization in brace application while under the direct supervision of the physician. Verbal and written instructions for the use of and application of this item were provided. They were instructed to contact the office immediately should the brace result in increased pain, decreased sensation, increased swelling or worsening of the condition.

## 2020-01-27 RX ORDER — FUROSEMIDE 20 MG/1
TABLET ORAL
Qty: 180 TABLET | Refills: 1 | Status: SHIPPED | OUTPATIENT
Start: 2020-01-27 | End: 2020-04-27 | Stop reason: SDUPTHER

## 2020-01-27 RX ORDER — SUMATRIPTAN 100 MG/1
TABLET, FILM COATED ORAL
Qty: 9 TABLET | Refills: 5 | Status: SHIPPED | OUTPATIENT
Start: 2020-01-27 | End: 2020-04-28 | Stop reason: SDUPTHER

## 2020-04-23 ENCOUNTER — PATIENT MESSAGE (OUTPATIENT)
Dept: FAMILY MEDICINE CLINIC | Age: 60
End: 2020-04-23

## 2020-04-23 RX ORDER — AMOXICILLIN AND CLAVULANATE POTASSIUM 875; 125 MG/1; MG/1
1 TABLET, FILM COATED ORAL 2 TIMES DAILY
Qty: 20 TABLET | Refills: 0 | Status: SHIPPED | OUTPATIENT
Start: 2020-04-23 | End: 2020-05-03

## 2020-04-23 NOTE — TELEPHONE ENCOUNTER
From: Vonda Chun  To: Crystal Lackey MD  Sent: 4/23/2020  1:53 AM EDT  Subject: Non-Urgent Medical Question    Hi. I have a tender little lump on the outside of my right lower jaw. I think I had an infected tooth (some food stuck under my bridge) and  have some submandibular gland swelling also. Im worried about an infection or abscess. No fever or other symptoms. I cant get my dentist to call me back, and I dont want it to get any worse. Its been about 5 days. Wondering if you can just call In a prescription for penicillin or Pen-VK to my pharmacy to see if that might clear it up.

## 2020-04-26 ENCOUNTER — PATIENT MESSAGE (OUTPATIENT)
Dept: FAMILY MEDICINE CLINIC | Age: 60
End: 2020-04-26

## 2020-04-27 ENCOUNTER — PATIENT MESSAGE (OUTPATIENT)
Dept: FAMILY MEDICINE CLINIC | Age: 60
End: 2020-04-27

## 2020-04-27 RX ORDER — FUROSEMIDE 20 MG/1
TABLET ORAL
Qty: 180 TABLET | Refills: 1 | Status: SHIPPED | OUTPATIENT
Start: 2020-04-27 | End: 2021-01-04 | Stop reason: SDUPTHER

## 2020-04-27 RX ORDER — ATORVASTATIN CALCIUM 20 MG/1
TABLET, FILM COATED ORAL
Qty: 90 TABLET | Refills: 3 | Status: SHIPPED | OUTPATIENT
Start: 2020-04-27 | End: 2021-01-04 | Stop reason: SDUPTHER

## 2020-04-27 RX ORDER — LEVOTHYROXINE SODIUM 0.05 MG/1
TABLET ORAL
Qty: 90 TABLET | Refills: 3 | Status: SHIPPED | OUTPATIENT
Start: 2020-04-27 | End: 2020-12-04 | Stop reason: SDUPTHER

## 2020-04-27 RX ORDER — MONTELUKAST SODIUM 10 MG/1
10 TABLET ORAL DAILY
Qty: 90 TABLET | Refills: 3 | Status: SHIPPED | OUTPATIENT
Start: 2020-04-27 | End: 2021-01-04 | Stop reason: SDUPTHER

## 2020-04-27 RX ORDER — POTASSIUM CHLORIDE 750 MG/1
TABLET, FILM COATED, EXTENDED RELEASE ORAL
Qty: 180 TABLET | Refills: 1 | Status: SHIPPED | OUTPATIENT
Start: 2020-04-27 | End: 2021-01-04 | Stop reason: SDUPTHER

## 2020-04-27 NOTE — TELEPHONE ENCOUNTER
From: Ann Chen  To: Peace Gary MD  Sent: 4/27/2020  4:19 PM EDT  Subject: Non-Urgent Medical Question    I see that the [facility]s were called in, but the Sumatriptan hasnt been called in to the [facility] in [place] yet. Thats the only one I need quick access to! Their phone number is [phone]. Thanks. Mahi Right      ----- Message -----       From:COLLIN Salinas       ETRL:5/95/9691 11:27 AM EDT         To:Xenia Mayes    Subject:RE: Non-Urgent Medical Question    Nicholas Remigio, we have received your fflick message and have forwarded it on to your physician. Please allow your physician 24-48 hours to respond.  If you have an urgent message please call our office. Thanks and have a great day! COLLIN Dunaway      ----- Message -----       From: Ann Chen       Sent:4/26/2020  7:31 PM EDT         To: Peace Gary MD    Subject:Non-Urgent Medical Question    [facility] (I downloaded the card info on this site) and need 90-day w/3 refills of the following Rxs sent to [facility] at [phone]:    Furosemide 20mg, BID  Atorvastatin 20mg, 1 daily   Klor-Con 10mEQ, 1 daily  Levothyroxine 50mcg, 1 daily  Montelukast 10mg, 1 daily    And the following 90-day w/3 refills Rx for Sumatriptan 100mg 1 at onset of migraine, may repeat in 2 hrs x1    [address] [place] [address] Phone: [phone]

## 2020-04-28 RX ORDER — SUMATRIPTAN 100 MG/1
TABLET, FILM COATED ORAL
Qty: 9 TABLET | Refills: 5 | Status: SHIPPED | OUTPATIENT
Start: 2020-04-28 | End: 2020-11-18

## 2020-06-04 ENCOUNTER — PATIENT MESSAGE (OUTPATIENT)
Dept: FAMILY MEDICINE CLINIC | Age: 60
End: 2020-06-04

## 2020-06-15 ENCOUNTER — PATIENT MESSAGE (OUTPATIENT)
Dept: FAMILY MEDICINE CLINIC | Age: 60
End: 2020-06-15

## 2020-06-15 NOTE — TELEPHONE ENCOUNTER
Do you want patient to schedule virtual visit for this or do you prefer in office    So, are you seeing patients in the office e yet? If not, do I make an e-visit appt for a specific issue? (worsening groin pain x ~5 months. Thought it was just a pulled muscle or hip flexor Issue; but no improvement with stretching exercises. Now pain with walking, sleeping, Decreased ROM, etc.)     The other thing is, Justine put on a lot of weight after Ermiass death, but working on getting back on a plan. Would like to have annual labs drawn, including HgbA1C and lipid profile. Nervous that my numbers have gone up. Would like to see how far Justine strayed. Please advise on both.  Thanks, Eleanor Maya

## 2020-06-20 ENCOUNTER — PATIENT MESSAGE (OUTPATIENT)
Dept: FAMILY MEDICINE CLINIC | Age: 60
End: 2020-06-20

## 2020-06-22 NOTE — TELEPHONE ENCOUNTER
From: Keely Govea  To: Mynor Edwards MD  Sent: 6/20/2020 10:31 AM EDT  Subject: Non-Urgent Medical Question    I think my appt time is wrong on the system-generateD message. It says 7/1 at 8:20, but should be 6/30 at 4pm. LALY.

## 2020-06-30 ENCOUNTER — OFFICE VISIT (OUTPATIENT)
Dept: FAMILY MEDICINE CLINIC | Age: 60
End: 2020-06-30
Payer: COMMERCIAL

## 2020-06-30 PROCEDURE — 99214 OFFICE O/P EST MOD 30 MIN: CPT | Performed by: FAMILY MEDICINE

## 2020-06-30 NOTE — PROGRESS NOTES
Here for f/u and recheck of groin pain, weight gain    Pt states that with stress, has not been doing as well with lifestyle. Pt was struggling with eating poorly and then when  passed away from AAA rupture. Pt has had a great support system and does get together with a close friend. Pt is going to work and that has been doing ok. Pt does not feel need/indication for any medications or therapy at this time. Pt may want to consider psychology going forward and if so will call for appointment with Dr. Julianna Michaels    Pt is starting to get back into lifestyle, trying to eat better. Pt is trying to get into exercise. Pt does want to get started but has noted some pain in R side of groin. Pt with sx that is present all the time, dull in nature and worse with walking/ambulation. Notices with change in position. Sx have been present for a few months. Sx have increased. Pain is deep and pulling/achiness. Pt can ambulate but with discomfort. No sx on L side. Pt denies any fall, injury. Pt did try advil and tylenol and does help to keep sx controlled but not helpful. Pt did try a gabapentin that she had but had moderate fatigue, malaise. Pt did have xray of lumbar spine and did show degenerative disc disease in 9/2019      Except as noted above in the history of present illness, the review of systems is  negative for headache, vision changes, chest pain, shortness of breath, abdominal pain, urinary sx, bowel changes. Past medical, surgical, and social history reviewed and updated  Medications and allergies reviewed and updated         O: BP (!) 142/90   Pulse 82   Temp 98.1 °F (36.7 °C) (Temporal)   Resp 12   Wt 228 lb (103.4 kg)   SpO2 96%   BMI 36.82 kg/m²   GEN: No acute distress, cooperative, well nourished, alert. HEENT: PEERLA, EOMI , normocephalic/atraumatic, nares and oropharynx clear. Mucous membranes normal, Tympanic membranes clear bilaterally.     Neck: soft, supple, no thyromegaly, mass, no Lymphadenopathy  CV: Regular rate and rhythm, no murmur, rubs, gallops. No edema. Resp: Clear to auscultation bilaterally good air entry bilaterally  No crackles, wheeze. Breathing comfortably. Psych: dysthymia, denies any ST/SI  Musc: full range of motion bilateral lower extremities. Moderate tender to palpation inner R groin, over hip. Minimal tender to palpation trochanteric bursa on R. Neg straight leg-raise bialterally. + reproduction of pain with internal/external rotation of hip      Current Outpatient Medications   Medication Sig Dispense Refill    SUMAtriptan (IMITREX) 100 MG tablet TAKE 1 TAB AT ONSET OF MIGRAINE. MAY TAKE 1 ADDTL TAB IN 2HRS IF NO RELIEF. MAX 2 TABS IN 24H. 9 tablet 5    atorvastatin (LIPITOR) 20 MG tablet TAKE 1 TABLET BY MOUTH EVERY DAY 90 tablet 3    potassium chloride (KLOR-CON 10) 10 MEQ extended release tablet TAKE 1 TABLET BY MOUTH TWICE A  tablet 1    levothyroxine (SYNTHROID) 50 MCG tablet TAKE 1 TABLET BY MOUTH EVERY DAY 90 tablet 3    montelukast (SINGULAIR) 10 MG tablet Take 1 tablet by mouth daily 90 tablet 3    furosemide (LASIX) 20 MG tablet TAKE 1 TABLET BY MOUTH TWICE A  tablet 1    NONFORMULARY Take 1 tablet by mouth nightly as needed Indications: Hylands leg cramps      aspirin 81 MG tablet Take 81 mg by mouth daily      ibuprofen (ADVIL;MOTRIN) 600 MG tablet Take 800 mg by mouth 2 times daily       Multiple Vitamin (MULTI-VITAMIN DAILY PO) Take  by mouth. Current Facility-Administered Medications   Medication Dose Route Frequency Provider Last Rate Last Dose    triamcinolone acetonide (KENALOG-40) injection 40 mg  40 mg Intra-articular Once Eli Delgado MD             ASSESSMENT / PLAN:    1. Elevated blood pressure reading  Recheck improved but still high  Suspect d/t lifestyle changes, weight gain  Monitor with diet/exercise and attempt at weight reduction  Recheck 2 months, consider meds for persistent sx    2. Right groin pain  Suspect hip related, will check xrays hip and lumbar spine  Management pending results. - XR HIP RIGHT (2-3 VIEWS); Future  - XR LUMBAR SPINE (2-3 VIEWS); Future    3. Hyperglycemia  Mild increase in a1c, suspect d/t lifestyle. Monitor with diet/exercise and weight loss and recheck 3 months    4. Stress  Moderate stressors d/t recent death of . Has great support system. Given name of dr. Jimenez Gonzalez to set up counseling prn    5. Hypothyroidism, unspecified type  Stable @ goal    6. Right hip pain  - XR HIP RIGHT (2-3 VIEWS); Future  - XR LUMBAR SPINE (2-3 VIEWS); Future    7. Screening for malignant neoplasm of cervix  Due cpe, pap  Will set up           Follow-up appointment:   Pending xray results    Discussed use, benefit, and side effects of all prescribed medications. Barriers to medication compliance addressed. All patient questions answered. Pt voiced understanding. When applicable, patient's outside records were [facility]. The patient has signed appropriate paperworks/consents.

## 2020-07-01 VITALS
RESPIRATION RATE: 12 BRPM | HEART RATE: 82 BPM | DIASTOLIC BLOOD PRESSURE: 90 MMHG | OXYGEN SATURATION: 96 % | WEIGHT: 228 LBS | TEMPERATURE: 98.1 F | SYSTOLIC BLOOD PRESSURE: 142 MMHG | BODY MASS INDEX: 36.82 KG/M2

## 2020-07-09 ENCOUNTER — PATIENT MESSAGE (OUTPATIENT)
Dept: FAMILY MEDICINE CLINIC | Age: 60
End: 2020-07-09

## 2020-07-10 NOTE — TELEPHONE ENCOUNTER
From: Serge Dong  To: Elray Holter, MD  Sent: 7/9/2020  8:22 AM EDT  Subject: Visit Follow-Up Question    So Im glad hip xrays are good, but groin pain still fairly acute. Very localized to just groin. So Im still thinking hip flexor. Would maybe like to do PT first, then see Hali John if no improvement. Does  That sound like a plan? How can I get PT orders from you? Let me know. Thank you for everything. ..

## 2020-07-13 NOTE — TELEPHONE ENCOUNTER
Patient would like an external referral for PT. She has not decided where she would like to go or if. She would like this mailed to her home.

## 2020-07-21 ENCOUNTER — TELEPHONE (OUTPATIENT)
Dept: FAMILY MEDICINE CLINIC | Age: 60
End: 2020-07-21

## 2020-07-21 NOTE — TELEPHONE ENCOUNTER
----- Message from Perry Flores sent at 7/21/2020  1:00 PM EDT -----  Subject: Message to Provider    QUESTIONS  Information for Provider? Please fax Physical Therapy orders to:   [address] C)[phone] ([phone]  ---------------------------------------------------------------------------  --------------  Gopal PRYOR  What is the best way for the office to contact you? OK to leave message on   voicemail  Preferred Call Back Phone Number? [phone]  ---------------------------------------------------------------------------  --------------  SCRIPT ANSWERS  Relationship to Patient?  Self

## 2020-07-22 ENCOUNTER — PATIENT MESSAGE (OUTPATIENT)
Dept: FAMILY MEDICINE CLINIC | Age: 60
End: 2020-07-22

## 2020-07-22 NOTE — TELEPHONE ENCOUNTER
From: Erica Denise  To: Liam Lee MD  Sent: 7/22/2020 10:18 AM EDT  Subject: Visit Follow-Up Question    Just wanting to follow up with Dr. Zaira Neely nurse to be sure Rx for my   physical therapy gets faxed to  [facility] in [place]. Fax: [phone]  Phone: [phone]    Thank you!

## 2020-07-30 ENCOUNTER — NURSE TRIAGE (OUTPATIENT)
Dept: OTHER | Facility: CLINIC | Age: 60
End: 2020-07-30

## 2020-07-30 ENCOUNTER — PATIENT MESSAGE (OUTPATIENT)
Dept: FAMILY MEDICINE CLINIC | Age: 60
End: 2020-07-30

## 2020-07-30 NOTE — TELEPHONE ENCOUNTER
Right groin, hip and now anterior thigh. Has seen PCP for this already, x-ray unremarkable. Started physical therapy last thursday. Was doing better but today can not put weigtht on the hip. Reason for Disposition   Patient wants to be seen    Answer Assessment - Initial Assessment Questions  1. LOCATION and RADIATION: \"Where is the pain located? \"       Right groin, hip and now anterior thigh. Has seen PCP for this already, x-ray unremarkable. Started physical therapy last thursday. Was doing better but today can not put weigtht on the hip.    2. QUALITY: \"What does the pain feel like? \"  (e.g., sharp, dull, aching, burning)      Sharp when walking, dull when at rest.  The dull pain at rest is new. 3. SEVERITY: \"How bad is the pain? \" \"What does it keep you from doing? \"   (Scale 1-10; or mild, moderate, severe)    -  MILD (1-3): doesn't interfere with normal activities     -  MODERATE (4-7): interferes with normal activities (e.g., work or school) or awakens from sleep, limping     -  SEVERE (8-10): excruciating pain, unable to do any normal activities, unable to walk      3/10 sitting, 8/10 with weight bearing. 4. ONSET: \"When did the pain start? \" \"Does it come and go, or is it there all the time? \"      It got worse yesterday. It has been going on for months. 5. WORK OR EXERCISE: \"Has there been any recent work or exercise that involved this part of the body? \"       Physical therapy yesterday. 6. CAUSE: \"What do you think is causing the hip pain? \"       No idea    7. AGGRAVATING FACTORS: \"What makes the hip pain worse? \" (e.g., walking, climbing stairs, running)      Walking    8. OTHER SYMPTOMS: \"Do you have any other symptoms? \" (e.g., back pain, pain shooting down leg,  fever, rash)      A little of sciatic in right butt area. Anterior thigh,    Protocols used: HIP PAIN-ADULT-OH    Pod 1    Is already seeing PCP for this.   Is wanting to know if PCP wants to do a MRI or another test.    Received call from [address]. Call soft transferred to [address] to schedule appointment. Please do not reply to the triage nurse through this encounter. Any subsequent communication should be directly with the patient.

## 2020-07-30 NOTE — TELEPHONE ENCOUNTER
We can do either, but likely would start with MRI as that is what the ortho will do  Order in system

## 2020-07-31 ENCOUNTER — PATIENT MESSAGE (OUTPATIENT)
Dept: FAMILY MEDICINE CLINIC | Age: 60
End: 2020-07-31

## 2020-07-31 ENCOUNTER — TELEPHONE (OUTPATIENT)
Dept: FAMILY MEDICINE CLINIC | Age: 60
End: 2020-07-31

## 2020-07-31 NOTE — TELEPHONE ENCOUNTER
MRI of right hip w/o contrast approved -  #[id number]  7-31-20 - 8-29-20      Phoned Ni Redd at [place] - [phone]

## 2020-07-31 NOTE — TELEPHONE ENCOUNTER
Pre-cert for test scheduled on Monday 8/3/2020. Type of Test: MRI HIP RIGHT WO CONTRAST        Dx: Dx: Chronic right hip pain [M25.551, G89.29 (ICD-10-CM)]     Facility patient will schedule at: [facility]     Tax ID [id number]    NPI [id number]      Date test is scheduled: Monday 8/3/2020 - at 4:45 pm    Insurance name: [employer] ID#:  [id number]       Group #: Not given     Subscriber:  Self       Tax ID [id number]    NPI [id number]      AIM phone # [phone]       Please follow up with Leonor Melgar at [place] [address]              Inform patient if test is performed without obtaining an authorization first, the patient may be responsible for the bill. Patient must go to an in -network facility and it is the patient's responsibility to call insurance to confirm the facility is in network. An authorization number is an insurance  approval  to schedule and have the test performed, it is not a guarantee of payment. Patient's should inquire about coverage directly with the insurance to inquire about coverage.

## 2020-07-31 NOTE — TELEPHONE ENCOUNTER
From: Rosa Gama  To: Eb See MD  Sent: 7/31/2020 3:28 PM EDT  Subject: Visit Follow-Up Question    [facility] my MRI that I scheduled for Mon 8/3 at 4:15pm at [facility]. Please advise.

## 2020-07-31 NOTE — TELEPHONE ENCOUNTER
From: Brad Client  To: COLLIN Army Martinez  Sent: 0/40/8945 5:13 PM EDT  Subject: RE:MRI    Please fax MRI order to [facility]: [phone]. ASAP if possible. Had hoped to get done today, but hopefully tomorrow. Got better for a little while today, then worse again w/weight bearing. Co workers brought my car up and took me out in a wheelchair. Its THAT bad. I would never have thought I would let anyone do that for me, but I dont I couldve made it to my car.      ----- Message -----   From:COLLIN LEY   DQ:6/12/8267 1:15 PM EDT   To:Xenia Bray, I left a message on your phone to call the office. It is in regards to your request for an MRI. Dr. Jenna Moody did write a referral. If you are going any place other than a [facility] facility, please let us know so we may fax it there. If you are going to [address] [place], [place] [address], the number to call is [phone]. Thank you.  COLLIN Dunaway

## 2020-07-31 NOTE — TELEPHONE ENCOUNTER
From: Sharon Barron  To: Swati Hunter MD  Sent: 7/30/2020 8:10 PM EDT  Subject: Non-Urgent Medical Question    Just to be sure you got my message to fax the MRI orders To [facility] at [phone]. I will probably on [facility], but possibly [facility] . Depends on who can get me in first. Thanks.

## 2020-10-06 ENCOUNTER — OFFICE VISIT (OUTPATIENT)
Dept: FAMILY MEDICINE CLINIC | Age: 60
End: 2020-10-06
Payer: COMMERCIAL

## 2020-10-06 VITALS
HEART RATE: 71 BPM | DIASTOLIC BLOOD PRESSURE: 84 MMHG | RESPIRATION RATE: 19 BRPM | OXYGEN SATURATION: 97 % | WEIGHT: 245.4 LBS | TEMPERATURE: 97.3 F | BODY MASS INDEX: 39.44 KG/M2 | HEIGHT: 66 IN | SYSTOLIC BLOOD PRESSURE: 136 MMHG

## 2020-10-06 PROBLEM — R73.9 HYPERGLYCEMIA: Status: ACTIVE | Noted: 2020-10-06

## 2020-10-06 PROCEDURE — 99214 OFFICE O/P EST MOD 30 MIN: CPT | Performed by: FAMILY MEDICINE

## 2020-10-06 RX ORDER — TOPIRAMATE 25 MG/1
TABLET ORAL
Qty: 70 TABLET | Refills: 0 | Status: SHIPPED | OUTPATIENT
Start: 2020-10-06 | End: 2020-11-11

## 2020-10-06 NOTE — PROGRESS NOTES
Regular rate and rhythm, no murmur, rubs, gallops. No edema. Resp: Clear to auscultation bilaterally good air entry bilaterally  No crackles, wheeze. Breathing comfortably. Psych: modeate dysthymia but improved. Denies any ST/SI. Current Outpatient Medications   Medication Sig Dispense Refill    topiramate (TOPAMAX) 25 MG tablet Sig 1 po qhs x 7d, then 2 po qhs x 7d, then 3 po qhs x 7, then 4 po qhs 70 tablet 0    SUMAtriptan (IMITREX) 100 MG tablet TAKE 1 TAB AT ONSET OF MIGRAINE. MAY TAKE 1 ADDTL TAB IN 2HRS IF NO RELIEF. MAX 2 TABS IN 24H. 9 tablet 5    atorvastatin (LIPITOR) 20 MG tablet TAKE 1 TABLET BY MOUTH EVERY DAY 90 tablet 3    potassium chloride (KLOR-CON 10) 10 MEQ extended release tablet TAKE 1 TABLET BY MOUTH TWICE A  tablet 1    levothyroxine (SYNTHROID) 50 MCG tablet TAKE 1 TABLET BY MOUTH EVERY DAY 90 tablet 3    montelukast (SINGULAIR) 10 MG tablet Take 1 tablet by mouth daily 90 tablet 3    furosemide (LASIX) 20 MG tablet TAKE 1 TABLET BY MOUTH TWICE A  tablet 1    NONFORMULARY Take 1 tablet by mouth nightly as needed Indications: Hylands leg cramps      aspirin 81 MG tablet Take 81 mg by mouth daily      ibuprofen (ADVIL;MOTRIN) 600 MG tablet Take 800 mg by mouth 2 times daily       Multiple Vitamin (MULTI-VITAMIN DAILY PO) Take  by mouth.        Current Facility-Administered Medications   Medication Dose Route Frequency Provider Last Rate Last Dose    triamcinolone acetonide (KENALOG-40) injection 40 mg  40 mg Intra-articular Once Aurelio Soni MD             Orders Only on 06/19/2020   Component Date Value Ref Range Status    Sodium 06/19/2020 136  135 - 145 mEq/L Final    Potassium 06/19/2020 3.4* 3.6 - 5.1 mEq/L Final    Chloride 06/19/2020 101  101 - 111 mEq/L Final    CO2 06/19/2020 27  24 - 36 mmol/L Final    Glucose 06/19/2020 104* 70 - 99 mg/dL Final    BUN 06/19/2020 23  8 - 26 mg/dL Final    CREATININE 06/19/2020 0.77  0.44 - 1.03 mg/dL Final    Calcium 06/19/2020 9.4  8.5 - 10.5 mg/dL Final    Total Protein 06/19/2020 7.1  6.0 - 8.0 g/dL Final    Alb 06/19/2020 4.2  3.5 - 5.0 g/dL Final    Total Bilirubin 06/19/2020 0.7  0.0 - 1.2 mg/dL Final    Alkaline Phosphatase 06/19/2020 86  35 - 135 IU/L Final    AST 06/19/2020 25  10 - 40 IU/L Final    ALT 06/19/2020 23  10 - 60 IU/L Final    GFR Non- 06/19/2020 83  >59 mL/min/1.73 m2 Final    GFR African American 06/19/2020 96  >59 mL/min/1.73 m2 Final    Comment: This estimated GFR was calculated using the CKD-EPI equation which is modified based on   ethnicity for Non  Americans and  Americans. Both results are reported since   it is not always possible to determine the patient's ethnicity from the requisition. This   equation should only be used for individuals 18 and older. It has not been validated for use with the elderly (>70 years), pregnant women, or in some   racial or ethnic subgroups, such as Hispanics. The equation will be less accurate in people   with differences in nutritional status or muscle mass.  Anion Gap 06/19/2020 8  6 - 18 mmol/L Final    Tested at [address]    TSH 06/19/2020 2.430  0.270 - 4.200 mIU/L Final    Comment: (NOTE)  Ingestion of mona doses of biotin (>5 mg/day) taken within 8 hours of  drawing blood sample can interfere with this immunoassay test.      Hemoglobin A1C 06/19/2020 5.8* 4.2 - 5.6 % Final    eAG 06/19/2020 120  mg/dL Final    Comment: (NOTE)  REFERENCE RANGE:  Normal: 4.0-5.6%  Pre-diabetes: 5.7-6.4%  Provisional diagnosis of diabetes: >6.4%  Hgb F>10% and anything which shortens red cell survival, such as  hemolytic anemia, or unstable hemoglobin variants such as HbSS, HbSC,  or HbCC, will lower the HbA1c value associated with a given level of  glycemic control. ASSESSMENT / PLAN:    1.  Weight gain  Due to sedentary lifestyle, depression, increase appetite  Aware risk of HTN, diabetes mellitus  Restart topamax as below to goal 100mg qhs  Discussed use, benefit, and side effects of prescribed medications. Barriers to medication compliance addressed. All patient questions answered. Pt voiced understanding. F/u 1 month to see how it is doing  encourgaed lifestyle mgt, diet/exercise    2. Hyperglycemia  Aware risk of progression toward diabetes mellitus  a1c 5.8  Cont lifestyle mgt, diet/exercise    3. Attention deficit disorder (ADD) without hyperactivity  Off meds d/t concern for elevation of blood pressure  Consider vyvanse if topamax not effective for weight    4. Hypothyroidism, unspecified type  Stable, reviewed bloodwork     5. Recurrent major depressive disorder, in full remission ([facility] [address]  Doing ok despite moderate stressors  Cont supportive therapy and will monitor  F/u increased sx severity         Follow-up appointment:   1 month for CPE, pap, f/u of topamax    Discussed use, benefit, and side effects of all prescribed medications. Barriers to medication compliance addressed. All patient questions answered. Pt voiced understanding. When applicable, patient's outside records were reviewed through [facility]. The patient has signed appropriate paperworks/consents.

## 2020-10-07 ENCOUNTER — TELEPHONE (OUTPATIENT)
Dept: FAMILY MEDICINE CLINIC | Age: 60
End: 2020-10-07

## 2020-10-07 NOTE — TELEPHONE ENCOUNTER
----- Message from Gwendolyn Dawn sent at 10/7/2020  9:36 AM EDT -----  Subject: Message to Provider    QUESTIONS  Information for Provider? pt needs a call back she would like to get an   email for the office so she can send over her insurance card . she forgot   to have it with her for appt yesterday please call   ---------------------------------------------------------------------------  --------------  CALL BACK INFO  What is the best way for the office to contact you? OK to leave message on   voicemail  Preferred Call Back Phone Number? [phone]  ---------------------------------------------------------------------------  --------------  SCRIPT ANSWERS  Relationship to Patient?  Self

## 2020-10-27 NOTE — TELEPHONE ENCOUNTER
Problem: Hemodialysis  Goal: Dialysis: Free of complications related to initiation/termination of dialysis (Hemodialysis nurse only)  10/26/2020 1911 by Ines Francis, RN  Outcome: Outcome Met, Complete Goal  Note: [facility] set down. All blood returned    10/26/2020 1656 by Ines Francis, RN  Outcome: Outcome Met, Continue evaluating goal progress toward completion      Last OV 11/30/17  Last labs 4/10/18  F/U not scheduled at this time  Please advise  Thank you

## 2020-11-11 ENCOUNTER — TELEPHONE (OUTPATIENT)
Dept: FAMILY MEDICINE CLINIC | Age: 60
End: 2020-11-11

## 2020-11-11 ENCOUNTER — OFFICE VISIT (OUTPATIENT)
Dept: FAMILY MEDICINE CLINIC | Age: 60
End: 2020-11-11
Payer: COMMERCIAL

## 2020-11-11 VITALS
RESPIRATION RATE: 20 BRPM | HEART RATE: 80 BPM | SYSTOLIC BLOOD PRESSURE: 120 MMHG | DIASTOLIC BLOOD PRESSURE: 83 MMHG | OXYGEN SATURATION: 98 % | WEIGHT: 235.5 LBS | BODY MASS INDEX: 37.85 KG/M2 | TEMPERATURE: 97.3 F | HEIGHT: 66 IN

## 2020-11-11 PROCEDURE — G8484 FLU IMMUNIZE NO ADMIN: HCPCS | Performed by: FAMILY MEDICINE

## 2020-11-11 PROCEDURE — 99396 PREV VISIT EST AGE 40-64: CPT | Performed by: FAMILY MEDICINE

## 2020-11-11 PROCEDURE — 90750 HZV VACC RECOMBINANT IM: CPT | Performed by: FAMILY MEDICINE

## 2020-11-11 PROCEDURE — 90471 IMMUNIZATION ADMIN: CPT | Performed by: FAMILY MEDICINE

## 2020-11-11 RX ORDER — CLOTRIMAZOLE AND BETAMETHASONE DIPROPIONATE 10; .64 MG/G; MG/G
CREAM TOPICAL
Qty: 45 G | Refills: 0 | Status: SHIPPED | OUTPATIENT
Start: 2020-11-11

## 2020-11-11 RX ORDER — TRAMADOL HYDROCHLORIDE 50 MG/1
50 TABLET ORAL EVERY 6 HOURS PRN
Qty: 30 TABLET | Refills: 0 | Status: SHIPPED | OUTPATIENT
Start: 2020-11-11 | End: 2020-12-11

## 2020-11-11 RX ORDER — ACETAMINOPHEN 650 MG/1
650 SUPPOSITORY RECTAL EVERY 4 HOURS PRN
COMMUNITY
End: 2020-11-23

## 2020-11-11 NOTE — TELEPHONE ENCOUNTER
Office has been notified that pt is requiring Prior Authorization for the following medication:  -- PA Request - Vyvanse    Please initiate this request through CoverMyMeds, contacting the following Payor/Insurance:  -- [employer]    Please see below, or the documentation attached to this encounter for any additional information that may assist in processing PA:  --     Thank you!

## 2020-11-11 NOTE — PROGRESS NOTES
Here for well checkup, physical, pap    Pt has been on topamax restart to see about helping weight. Pt has found that it has helped but with moderate decrease in memory. Pt feels too cloudy and memory is concerning, and wants to look at other options. Pt wants to consider vyvanse. Pt does have hx of ADD and does feel that she would benefit    Pt continues to deal with moderate stress/dysthymia. Pt states she is trying to manage as much as possible. Pt is in the process of retiring at the end of the this year, and will be buying a camper [place] and hopes to travel. Pt does feel she has a decent support system, but not a lot of people locally. Pt does not feel that she is at the point where she wants to discuss therapy. Pt has been following closely with ortho for osteoarthritis of hand. Pt working with dr. Taylor London and they will consider cortisone injections. Except as noted in the history of present illness as above, the review of systems is negative for the following:    General ROS: negative  Psychological ROS: negative  Allergy and Immunology ROS: negative  Hematological and Lymphatic ROS: negative  Respiratory ROS: no cough, shortness of breath, or wheezing  Cardiovascular ROS: no chest pain or dyspnea on exertion  Gastrointestinal ROS: no abdominal pain, change in bowel habits, or black or bloody stools  Genito-Urinary ROS: no dysuria, trouble voiding, or hematuria  Musculoskeletal ROS: negative  Dermatological ROS: negative      Past medical, surgical, and social history reviewed and updated. Medications and allergies reviewed and updated        /83 (Site: Right Upper Arm, Position: Sitting, Cuff Size: Large Adult)   Pulse 80   Temp 97.3 °F (36.3 °C) (Temporal)   Resp 20   Ht 5' 6\" (1.676 m)   Wt 235 lb 8 oz (106.8 kg)   SpO2 98%   BMI 38.01 kg/m²   General appearance - healthy, alert, no distress  Skin - Skin color, texture, turgor normal. No rashes or lesions.   No suspicious findings  Head - Normocephalic. No masses, lesions, tenderness or abnormalities  Eyes - conjunctivae/corneas clear. Pupils equal and reactive to light and accomodation, extraocular muscles intact. Ears - External ears normal. Canals clear. Tympanic membranes normal bilaterally. Nose/Sinuses - Nares normal. Septum midline. Mucosa normal. No drainage or sinus tenderness. Oropharynx - Lips, mucosa, and tongue normal. Teeth and gums normal.   Neck - Neck supple. No adenopathy. Thyroid symmetric, normal size, no carotid bruit bilaterally  Back - Back symmetric, no curvature. Range of motion normal. No Costovertebral angle tenderness. Lungs - Percussion normal. Good diaphragmatic excursion. Lungs clear without wheeze, rales, crackles  Heart - Regular rate and rhythm, with no rub, murmur or gallop noted. Abdomen - Abdomen soft, non-tender. Bowel sounds normal. No masses, tenderness or organomegaly  Breasts: breasts appear normal, no suspicious masses, no skin or nipple changes or axillary nodes. Self exam is encouraged. Pelvis: normal external genitalia, vulva, vagina, cervix, uterus and adnexa, PAP: Pap smear done today, thin-prep method, HPV test.   Extremities - Extremities normal. No deformities, edema, or skin discoloration  Musculoskeletal - Spine ROM normal. Muscular strength intact. Peripheral pulses - radial=4/4,, femoral=4/4, popliteal=4/4, dorsalis pedis=4/4,  Neuro - Gait normal. Reflexes normal and symmetric. Sensation grossly normal.  No focal weakness  Psych - euthymic, no suicidal thoughts or ideation, mood stable. Exam chaperoned by female assistant. ASSESSMENT / PLAN:    1. Well woman exam  No focal abnormalities on exam  Anticipatory guidance discussed. Pap.pelvic.breast exam today  - PAP SMEAR    2. Attention deficit disorder (ADD) without hyperactivity  breakthru sx off therapy  Trial vyvanse 50mg qd  Discussed use, benefit, and side effects of prescribed medications.   Barriers to medication compliance addressed. All patient questions answered. Pt voiced understanding.   - lisdexamfetamine (VYVANSE) 50 MG capsule; Take 1 capsule by mouth every morning for 30 days. Dispense: 30 capsule; Refill: 0    3. Recurrent major depressive disorder, in full remission ([address]  Doing ok despite moderate stressors  Cont supportive therapy and will monitor    4. Hypercholesteremia  Check fasting bloodwork as below  Cont statin therapy  - Lipid Panel; Future  - Comprehensive Metabolic Panel; Future    5. Hypothyroidism, unspecified type  Stable @ goal  Reviewed recent bloodwork    6. Right hip pain  Chronic persistent sx, c/w end stage osteoarthritis  Discussed tx options. Will give short course tramadol QID prn for breakthru sx  Cont tylenol, NSAIDS  Refer to ortho for evaluation,   - traMADol (ULTRAM) 50 MG tablet; Take 1 tablet by mouth every 6 hours as needed for Pain for up to 30 days. Dispense: 30 tablet; Refill: 0  [id number]    7. Hyperglycemia  - Hemoglobin A1C; Future    8. Class 2 obesity without serious comorbidity with body mass index (BMI) of 38.0 to 38.9 in adult, unspecified obesity type  Improved with lifestyle, topamax  D/c topamax d/t side effects  Will monitor    9. Migraine without aura and without status migrainosus, not intractable  Cont supportive therapy    10. Postmenopausal  Check dexa  - DEXA BONE DENSITY AXIAL SKELETON; Future    11. Need for shingles vaccine  Pt is due for update on shingles vaccine. Pt is given information on Shingrix vaccine, need for 2 doses spaced 2-6 months apart, and that due to medicare requirements, they may need to get vaccine at pharmacy. Pt given information/prescription if appropriate. Limited stock availability also discussed. Given # 1 today, f/u 2-6 months    12.  Dyshidrotic eczema  Local care, lotrisone cream prn           Follow-up appointment:   1 month/pending bloodwork

## 2020-11-13 DIAGNOSIS — E78.00 HYPERCHOLESTEREMIA: ICD-10-CM

## 2020-11-13 DIAGNOSIS — R73.9 HYPERGLYCEMIA: ICD-10-CM

## 2020-11-13 LAB
A/G RATIO: 1.7 (ref 1.1–2.2)
ALBUMIN SERPL-MCNC: 4.5 G/DL (ref 3.4–5)
ALP BLD-CCNC: 121 U/L (ref 40–129)
ALT SERPL-CCNC: 17 U/L (ref 10–40)
ANION GAP SERPL CALCULATED.3IONS-SCNC: 12 MMOL/L (ref 3–16)
AST SERPL-CCNC: 18 U/L (ref 15–37)
BILIRUB SERPL-MCNC: 0.4 MG/DL (ref 0–1)
BUN BLDV-MCNC: 16 MG/DL (ref 7–20)
CALCIUM SERPL-MCNC: 10.1 MG/DL (ref 8.3–10.6)
CHLORIDE BLD-SCNC: 103 MMOL/L (ref 99–110)
CHOLESTEROL, TOTAL: 157 MG/DL (ref 0–199)
CO2: 25 MMOL/L (ref 21–32)
CREAT SERPL-MCNC: 0.7 MG/DL (ref 0.6–1.2)
GFR AFRICAN AMERICAN: >60
GFR NON-AFRICAN AMERICAN: >60
GLOBULIN: 2.6 G/DL
GLUCOSE BLD-MCNC: 105 MG/DL (ref 70–99)
HDLC SERPL-MCNC: 70 MG/DL (ref 40–60)
LDL CHOLESTEROL CALCULATED: 73 MG/DL
POTASSIUM SERPL-SCNC: 3.7 MMOL/L (ref 3.5–5.1)
SODIUM BLD-SCNC: 140 MMOL/L (ref 136–145)
TOTAL PROTEIN: 7.1 G/DL (ref 6.4–8.2)
TRIGL SERPL-MCNC: 71 MG/DL (ref 0–150)
VLDLC SERPL CALC-MCNC: 14 MG/DL

## 2020-11-14 LAB
ESTIMATED AVERAGE GLUCOSE: 122.6 MG/DL
HBA1C MFR BLD: 5.9 %

## 2020-11-17 NOTE — TELEPHONE ENCOUNTER
Vyvanse 50MG capsules  This medication may be excluded from the patient's benefit.    Message from [employer]: Drug is not covered by plan

## 2020-11-18 RX ORDER — SUMATRIPTAN 100 MG/1
TABLET, FILM COATED ORAL
Qty: 9 TABLET | Refills: 4 | Status: SHIPPED | OUTPATIENT
Start: 2020-11-18 | End: 2021-01-04 | Stop reason: SDUPTHER

## 2020-11-19 LAB
HPV COMMENT: NORMAL
HPV TYPE 16: NOT DETECTED
HPV TYPE 18: NOT DETECTED
HPVOH (OTHER TYPES): NOT DETECTED

## 2020-11-20 ENCOUNTER — HOSPITAL ENCOUNTER (OUTPATIENT)
Dept: GENERAL RADIOLOGY | Age: 60
Discharge: HOME | End: 2020-11-20
Payer: COMMERCIAL

## 2020-11-20 PROCEDURE — 77080 DXA BONE DENSITY AXIAL: CPT

## 2020-11-23 ENCOUNTER — OFFICE VISIT (OUTPATIENT)
Dept: ORTHOPEDIC SURGERY | Age: 60
End: 2020-11-23
Payer: COMMERCIAL

## 2020-11-23 VITALS — WEIGHT: 235 LBS | TEMPERATURE: 98.5 F | BODY MASS INDEX: 37.77 KG/M2 | HEIGHT: 66 IN

## 2020-11-23 PROCEDURE — G8484 FLU IMMUNIZE NO ADMIN: HCPCS | Performed by: ORTHOPAEDIC SURGERY

## 2020-11-23 PROCEDURE — 3017F COLORECTAL CA SCREEN DOC REV: CPT | Performed by: ORTHOPAEDIC SURGERY

## 2020-11-23 PROCEDURE — G8417 CALC BMI ABV UP PARAM F/U: HCPCS | Performed by: ORTHOPAEDIC SURGERY

## 2020-11-23 PROCEDURE — 99204 OFFICE O/P NEW MOD 45 MIN: CPT | Performed by: ORTHOPAEDIC SURGERY

## 2020-11-23 PROCEDURE — G8427 DOCREV CUR MEDS BY ELIG CLIN: HCPCS | Performed by: ORTHOPAEDIC SURGERY

## 2020-11-23 PROCEDURE — 1036F TOBACCO NON-USER: CPT | Performed by: ORTHOPAEDIC SURGERY

## 2020-11-23 RX ORDER — SENNOSIDES 8.6 MG
1300 CAPSULE ORAL 2 TIMES DAILY PRN
COMMUNITY

## 2020-11-23 NOTE — PROGRESS NOTES
Dr Manish Kirkland      Date /Time 11/23/2020       1:01 PM EST  Name Candance Magnus             1960   Location  [facility]  MRN <[id number]>                Chief Complaint   Patient presents with    New Patient     Right hip         History of Present Illness      Candance Magnus is a 61 y.o. female who presents with  right hip pain. Sent in consultation by Michael Tovar MD.    Occupation: Nurse  Occupational activities: light lifting. Athletic/exercise activity: no sports. Injury Mechanism:  none. Worker's Comp. & legal issues:   none. 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 Her health insurance is out-of-pocket starting in January. She has had a previous injection and significant groin pain for months to years. Her groin pain is progressed considerably. She also has some tenderness along the trochanter. She had an MRI which showed considerable osteoarthritis, worse than the x-ray. She has seen physicians in [place], is here for second opinion.     Past History       Past Medical History:   Diagnosis Date    Allergic rhinitis, cause unspecified     Attention deficit disorder without mention of hyperactivity 8/30/2011    Cough variant asthma     Cough variant asthma     Depression     Edema     HSV-1 infection     Hypercholesteremia     Hyperglycemia     Hypothyroidism     Lumbago     Migraine headache     Posterior vitreous detachment of left eye     Routine gynecological examination     Stress incontinence 9/28/2017     Past Surgical History:   Procedure Laterality Date    BREAST LUMPECTOMY      FOOT SURGERY Right 05/2016    fusion of metatarsal    ROTATOR CUFF REPAIR Right 2009    ROTATOR CUFF REPAIR Right 12/2017    TONSILLECTOMY AND ADENOIDECTOMY       Family History   Problem Relation Age of (KENALOG-40) injection 40 mg  40 mg Intra-articular Once Deborah Pires MD          Flexeril [cyclobenzaprine]; Levaquin [levofloxacin in d5w]; and Nickel      ASCVD 10-YEAR RISK SCORE  The 10-year ASCVD risk score (Maureen Leblanc, et al., 2013) is: 2%    Values used to calculate the score:      Age: 61 years      Sex: Female      Is Non- : No      Diabetic: No      Tobacco smoker: No      Systolic Blood Pressure: 632 mmHg      Is BP treated: No      HDL Cholesterol: 70 mg/dL      Total Cholesterol: 157 mg/dL     Review of Systems      10 point review of systems was reviewed and negative other than HPI. Physical Exam    Based off 1997 Exam Criteria    Temp 98.5 °F (36.9 °C)   Ht 5' 6\" (1.676 m)   Wt 235 lb (106.6 kg)   BMI 37.93 kg/m²      Constitutional:       General: He is not in acute distress. Appearance: Normal appearance. Cardiovascular:      Rate and Rhythm: Normal rate and regular rhythm. Pulses: Normal pulses. Pulmonary:      Effort: Pulmonary effort is normal. No respiratory distress. Neurological:      Mental Status: He is alert and oriented to person, place, and time. Mental status is at baseline.      Musculoskeletal:  Gait:  antalgic    Spine / Hip Exam:      RIGHT  LEFT    Lumbar Spine Exam  [x] All Neg    [x] All Neg     Straight leg raise  []  []Not tested   []  []Not tested    Clonus  []  []Not tested   []  []Not tested    Pain with motion  []  []Not tested   []  []Not tested    Radiculopathy  []  []Not tested   []  []Not tested    Paraspinal muscle tenderness  [] Paraspinal  []Midline   [] Paraspinal  []Midline   Sensation RIGHT  LEFT    L3  [x] Normal []Decreased    [x] Normal []Decreased   L4  [x] Normal  []Decreased   [x] Normal []Decreased   L5  [x] Normal []Decreased   [x] Normal []Decreased   S1  [x] Normal  []Decreased   [x] Normal []Decreased   Pelvis       Scoliosis  [x] Nml  [] Present     Leg-length discrepency  [x] Equal  [] Right longer [] Left longer   Range of Motion Active Passive Active Passive   Hip Flexion 90  120    Abduction 50  50    External Rotation @ 90 flex 35  45    Internal Rotation @ 90 flex -10  0           Hip Impingement / Dysplasia  [] All Neg  [] Not tested   [x] All Neg  [] Not tested    Hip impingement test  [x]  []Not tested   []  []Not tested    C-sign  [x]  []Not tested   []  []Not tested    Anterior instability apprehension  []  []Not tested   []  []Not tested    Posterior instability apprehension  []  []Not tested   []  []Not tested    Uncontained Internal rotation  []  []Not tested  []  []Not tested          Abductors  [] All Neg  [] Not tested   [x] All Neg  [] Not tested    Medius weakness  [x]  []Not tested   []  []Not tested    Minimum weakness  []  []Not tested   []  []Not tested    IT band tendonitis  []  []Not tested   []  []Not tested    Trochanteric tenderness  [x]  []Not tested  []  []Not tested   Sciatic neuropathic pain  []  []Not tested   []  []Not tested           Post-arthroplasty  [] All Neg  [x] Not tested   [] All Neg  [x] Not tested    Rectus tendonitis  []  []Not tested   []  []Not tested    Iliopsoas tendonitis       Start-up pain  []  []Not tested   []  []Not tested      Significantly positive Stinchfield test.  Significantly limited range of motion of the hip. Compensatory external rotation present. No leg length difference. Imaging       Right Hip: [facility]  Radiographs: Moderate osteoarthritis. Subchondral cysts and osteophytes present. MRI: There is a clear subchondral cyst present in the acetabulum with reactive bone edema present in the acetabulum and the femoral head. Degenerative labrum tearing. Considerable osteophytes. End-stage osteoarthritis identified. Assessment and Plan    Ray Galarza was seen today for new patient.     Diagnoses and all orders for this visit:    Primary osteoarthritis of right hip    Contracture of right hip    Hip abductor tendinitis, right        I discussed with Sofi Thomas that her history, symptoms, signs and imaging are most consistent with hip arthritis and Possible abductor tear    We reviewed the natural history of these conditions and treatment options ranging from conservative measures (rest, icing, activity modification, physical therapy, pain meds, cortisone injection)  to surgical options. We had a long discussion with the patient about their hip. We discussed surgical and non surgical options for hip arthritis. The most important thing is to work to maintain their range of motion. Next they can try medications including tylenol and NSAIDs. They can try glucosamine or chondroitin. They should also ice frequently and avoid activities that make their hip hurt. Cortisone injections and Synvisc injections are also options when medicine has failed. We finally discussed surgical options including arthroscopic debridement versus hip replacement. Often the arthritis is too far gone for an arthroscopic debridement and pain relief will be short term. Their ultimate solution will be a hip replacement when they are ready for it. They should put it off until they can no longer stand the pain and when nothing else has worked. Conservative measures have failed. She is not interested in cortisone injections. I think she is an appropriate candidate for surgery due to her ongoing symptoms and dysfunction despite conservative measures. The procedure would be  [id number] Total Hip Arthroplasty, posterior approach  Possible right hip abductor repair    Perioperative considerations include: Pre-operative clearance from medical subspecialty. We reviewed the risks, benefits, alternatives of this approach.  We discussed risks including, but not limited to, bleeding, pain, infection, scarring, damage to the neurovascular structures, blood clots, pulmonary embolus, stiffness, implant instability or loosening, implant failure, incomplete relief of pain, and incomplete return of function. We also reviewed the surgical details, expected recovery, and rehabilitation (6-9 months). She expressed understanding and will undergo preoperative medical evaluation and optimization. We will [facility] for January at [facility]  She will be done as an inpatient with likely 1 to 2-day stay. She needs inpatient physical therapy. She has essentially no help at home and only has 1 or 2 friends that can help visit a few hours of the day. Doing outpatient alone may put her at high risk for falls and other complications. Electronically signed by Kingsley Eubanks MD on 11/23/2020 at 1:01 PM  This dictation was generated by voice recognition computer software. Although all attempts are made to edit the dictation for accuracy, there may be errors in the transcription that are not intended.

## 2020-11-23 NOTE — LETTER
[facility]  Surgery Scheduling Form:    20     DEMOGRAPHICS    Patient Name:  Ang Montez  Patient :  1960   Patient SS#:  [id number]    Patient Phone:  [phone] (home) [phone] (work) Alt. Patient Phone:    Patient Address:  [address]  [place] [address]    PCP:  Gigi Bach MD  Insurance:  Payor: Aristides Gallon / Plan: Mary Rousseau / Product Type: *No Product type* /   Insurance ID Number:  Payor/Plan Subscr  Sex Relation Sub.  Ins. ID Effective Group Num   1. DAVIAN - * DAIJA ALANIZ 1960 Female [facility] [id number] [facility] [address]       DIAGNOSIS & PROCEDURE    Diagnosis:   M16.11 Right hip primary osteoarthritis  S76.001 Unspecified injury of muscle, fascia and tendon of right hip    Operation: RIGHT  82079 Total Hip Arthroplasty Posterior    Possible Abductor tendon repair    Provider:  Judy Avalos MD    Location:  [facility] INFORMATION    Requested Date:  2021   Requested Time:  8:30 Patient Arrival Time:  6:30  OR Time Required:  120 Minutes  Admission:  []Outpatient   []23 hour  [x]Same Day Admit:   1-2  days  []Inpatient    Anesthesia:  [x]General  []Spinal  []MAC/Sedation  Regional Anesthesia:  []None  []Lumbar Plexus Block  []Fascia Iliaca  []Femoral  []Adductor canal  []Interscalene Block  []Insert Catheter         EQUIPMENT    Position:  []Supine  [x]Lateral  []Beach-chair  []Prone    OR Bed:  [x]Regular  []Lagro  []Yo  [x]Hip hodge  []Beach-chair  []Spyder  Radiology:  []Large C-arm  []Small C-arm  []Portable X-ray      Implants:  [employer] Hip:  [x]Primary Set  []Revision Set  [x]Anchors  Medacta Hip:  []Dual-modular acetabulum (DM)    SUTURE: [x]#5 Ethibond  [x]#2 Ethibond  [x]#2 Quill  []#1 PDS  [x]#1 Vicryl                   [x]2-0 Vicryl  [x]3-0 Monocryl  []2-0 Nylon  []3-0 Nylon  []3-0 PDS                    []Dermabond  []Steri-strips (in half) DRESSING:  [x]Prineo dermabond  []4x4 gauze  [x]ABDs  [x]Tegaderm  BRACE: [x]Pelvic Binder  []Hip X-ACT  []Knee TROM  []Knee immobilizer                 []Shoulder Immob. (w/abd. pillow)  []Sling  []Ice Unit  []Ace-Wrap                 [x]Thigh tedhose       [x][employer]:  Jerome Smith [phone], [email or web address]  []Medacta: Camryn Lenz [phone], [email or web address]  []Fx Shoulder: Mary Purcell [phone], [email or web address]  []Cumming: Nimisha Resendez [phone], [email or web address]          Pierce Glaser MD  [address] [facility]  11/23/2020       1:06 PM EST

## 2020-11-24 ENCOUNTER — TELEPHONE (OUTPATIENT)
Dept: ORTHOPEDIC SURGERY | Age: 60
End: 2020-11-24

## 2020-11-25 ENCOUNTER — PATIENT MESSAGE (OUTPATIENT)
Dept: ORTHOPEDIC SURGERY | Age: 60
End: 2020-11-25

## 2020-11-25 NOTE — TELEPHONE ENCOUNTER
Good questions, but unfortunately I will not have any answers for you till Monday (11/30) when I can talk to Dr. Ada Haywood.    My best guess, and this is going on what he has been doing with other patients is: no brace, but crutches with toe touch WB for 6 weeks, then 6 weeks of 50% WB (similar to a hip scope recovery). And no PT, that will not be ordered till after your first post-op visit (Feb 2nd). I'll call you Monday afternoon with more definite answers.       DP

## 2020-11-25 NOTE — TELEPHONE ENCOUNTER
From: Kurt García  To: Vicki Fontenot MD  Sent: 11/25/2020 2:17 PM EST  Subject: Visit Follow-Up Question    Im curious about the type of brace that would be worn if I need to have the abductor tendon repair, in addition to the hip replacement. Do you have a photo or name of it? And also with that repair, will there be additional healing time, use of crutches vs walker, delays in starting PT or anything else I should know ahead of the surgery. 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 Whatever you can tell me will be helpful for planning since I live alone. Thanks so much! Happy TG!

## 2020-11-27 ENCOUNTER — TELEPHONE (OUTPATIENT)
Dept: ORTHOPEDIC SURGERY | Age: 60
End: 2020-11-27

## 2020-11-27 NOTE — TELEPHONE ENCOUNTER
Other PATIENT NEED APPT. Meron Millan, [address] [facility]. IDA ON FRIDAY 4TH., NOT AWARE OF APPT. ON THE 11TH.

## 2020-12-02 NOTE — TELEPHONE ENCOUNTER
Josue Salinas! Have some answers for you:    1)  You will have a hip brace after surgery. The brace is from a company called  [employer] and is their  VERSAROM Hip Brace. You can get more details about it at their website: [email or web address].       2) There should be no delays in your healing or with starting PT; You will be weight bearing as tolerated; The only thing that is definitely not allowed is any abduction of that hip. 3)  Also your surgery will be at 8:30 am not 7:30 as we discussed. So you will need to at hospital for check-in at 6:30 am not 5:30. 4) And I need to know where you want to do your Physical Therapy? You will have a Prehab appointment prior to surgery and then your Rehab appointments at the same location. We can go over all of this again at your appointment on Friday. See you then!

## 2020-12-03 ENCOUNTER — TELEPHONE (OUTPATIENT)
Dept: ORTHOPEDIC SURGERY | Age: 60
End: 2020-12-03

## 2020-12-04 ENCOUNTER — TELEPHONE (OUTPATIENT)
Dept: ORTHOPEDIC SURGERY | Age: 60
End: 2020-12-04

## 2020-12-04 ENCOUNTER — OFFICE VISIT (OUTPATIENT)
Dept: ORTHOPEDIC SURGERY | Age: 60
End: 2020-12-04
Payer: COMMERCIAL

## 2020-12-04 ENCOUNTER — PATIENT MESSAGE (OUTPATIENT)
Dept: ORTHOPEDIC SURGERY | Age: 60
End: 2020-12-04

## 2020-12-04 PROCEDURE — 99214 OFFICE O/P EST MOD 30 MIN: CPT | Performed by: ORTHOPAEDIC SURGERY

## 2020-12-04 PROCEDURE — 1036F TOBACCO NON-USER: CPT | Performed by: ORTHOPAEDIC SURGERY

## 2020-12-04 PROCEDURE — G8484 FLU IMMUNIZE NO ADMIN: HCPCS | Performed by: ORTHOPAEDIC SURGERY

## 2020-12-04 PROCEDURE — 3017F COLORECTAL CA SCREEN DOC REV: CPT | Performed by: ORTHOPAEDIC SURGERY

## 2020-12-04 PROCEDURE — G8427 DOCREV CUR MEDS BY ELIG CLIN: HCPCS | Performed by: ORTHOPAEDIC SURGERY

## 2020-12-04 PROCEDURE — G8417 CALC BMI ABV UP PARAM F/U: HCPCS | Performed by: ORTHOPAEDIC SURGERY

## 2020-12-04 RX ORDER — TRAMADOL HYDROCHLORIDE 50 MG/1
50 TABLET ORAL EVERY 6 HOURS PRN
Qty: 40 TABLET | Refills: 0 | Status: SHIPPED | OUTPATIENT
Start: 2020-12-04 | End: 2020-12-18

## 2020-12-04 NOTE — TELEPHONE ENCOUNTER
PATIENT RETURNED CALL REGARDING HER INSURANCE FOR UPCOMING SURGERY.  PATIENT'S INSURANCE WILL BE CHANGING FOR 2021 AND THIS HAS BEEN UPDATED

## 2020-12-04 NOTE — TELEPHONE ENCOUNTER
From: Coco Mancera  To: Selina Paula MD  Sent: 12/4/2020 12:22 PM EST  Subject: Visit Follow-Up Question    Zuleima--I knew I'd forget to ask this while I was there (b/c I didn't write it down), but is it possible to get a Rx for a Handicapped placard for my car. My ability to walk any distance right now has become limited, plus I still have 5 days left to work at [employer] The parking lot is a long walk into the hospital, plus I assume I may need it post-op, too. So, I'm hoping that's something you can just put in the mail. Thanks so much. (Funny/not funny story, I had a placard for my , good thru 2024, that I could've kept, but decided to do the right thing and send it back.  Little did I know where I'd be today. )

## 2020-12-04 NOTE — LETTER
[facility]  [address] [facility] [address]  Phone: [phone]  Fax: [phone]    Lyric Ying MD        December 4, 2020     Patient: Aura Sandifer Mummey   YOB: 1960   Date of Visit: 12/4/2020       To Whom It May Concern: It is my medical opinion that Alyssa Dakins requires a disability parking placard for the following reasons:  She has limited walking ability due to an orthopedic condition. Duration of need: 3 months (through 2/28/2021). If you have any questions or concerns, please don't hesitate to call.     Sincerely,      Lyric Ying MD  [address] [facility]

## 2020-12-04 NOTE — PROGRESS NOTES
Dr Jen Alexander      Date /Time 2020       1:01 PM EST  Name Cee Kong             1960   Location  [facility]  MRN <[id number]>                No chief complaint on file. History of Present Illness      Cee Kong is a 61 y.o. female who presents with  right hip pain. Sent in consultation by Maryse Zepeda MD.    Here for follow-up regarding her right hip. Pain is gotten significantly worse since I last saw her. She has pain both in her groin as well as lateral aspect of her hip. Is mostly activity related. In addition, she has been unable to secure some help after potential surgery.     Past History       Past Medical History:   Diagnosis Date    Allergic rhinitis, cause unspecified     Attention deficit disorder without mention of hyperactivity 2011    Cough variant asthma     Cough variant asthma     Depression     Edema     HSV-1 infection     Hypercholesteremia     Hyperglycemia     Hypothyroidism     Lumbago     Migraine headache     Posterior vitreous detachment of left eye     Routine gynecological examination     Stress incontinence 2017     Past Surgical History:   Procedure Laterality Date    BREAST LUMPECTOMY      FOOT SURGERY Right 2016    fusion of metatarsal    ROTATOR CUFF REPAIR Right     ROTATOR CUFF REPAIR Right 2017    TONSILLECTOMY AND ADENOIDECTOMY       Family History   Problem Relation Age of Onset    Brain Cancer Mother     Coronary Art Dis Brother     Diabetes Brother     Bipolar Disorder Brother     Coronary Art Dis Unknown     Hypertension Unknown     Cancer Sister       Social History     Tobacco Use    Smoking status: Former Smoker     Packs/day: 1.00     Years: 16.00     Pack years: 16.00     Types: Cigarettes     Last attempt to quit: 1992     Years since quittin.9    Smokeless tobacco: Never Used   Substance Use Topics    Alcohol use: No      Current Outpatient Medications on File Prior to Visit   Medication Sig Dispense Refill    acetaminophen (TYLENOL) 650 MG extended release tablet Take by mouth      SUMAtriptan (IMITREX) 100 MG tablet TAKE ONE TABLET BY MOUTH AT ONSET OF HEADACHE; MAY REPEAT ONE TABLET IN 2 HOURS IF NEEDED. MAX 2 TABLETS IN 24 HOURS. 9 tablet 4    traMADol (ULTRAM) 50 MG tablet Take 1 tablet by mouth every 6 hours as needed for Pain for up to 30 days. 30 tablet 0    clotrimazole-betamethasone (LOTRISONE) 1-0.05 % cream Apply topically 2 times daily. 45 g 0    atorvastatin (LIPITOR) 20 MG tablet TAKE 1 TABLET BY MOUTH EVERY DAY 90 tablet 3    potassium chloride (KLOR-CON 10) 10 MEQ extended release tablet TAKE 1 TABLET BY MOUTH TWICE A  tablet 1    montelukast (SINGULAIR) 10 MG tablet Take 1 tablet by mouth daily 90 tablet 3    furosemide (LASIX) 20 MG tablet TAKE 1 TABLET BY MOUTH TWICE A  tablet 1    NONFORMULARY Take 1 tablet by mouth nightly as needed Indications: Hylands leg cramps      aspirin 81 MG tablet Take 81 mg by mouth daily      ibuprofen (ADVIL;MOTRIN) 600 MG tablet Take 800 mg by mouth 2 times daily       Multiple Vitamin (MULTI-VITAMIN DAILY PO) Take  by mouth. Current Facility-Administered Medications on File Prior to Visit   Medication Dose Route Frequency Provider Last Rate Last Dose    triamcinolone acetonide (KENALOG-40) injection 40 mg  40 mg Intra-articular Once Malinda Mares MD          Flexeril [cyclobenzaprine];  Levaquin [levofloxacin in d5w]; and Nickel      ASCVD 10-YEAR RISK SCORE  The 10-year ASCVD risk score (Abimbola Campoverde, et al., 2013) is: 2%    Values used to calculate the score:      Age: 61 years      Sex: Female      Is Non- : No      Diabetic: No      Tobacco smoker: No      Systolic Blood Pressure: 963 mmHg      Is BP treated: No      HDL Cholesterol: 70 mg/dL      Total Cholesterol: 157 mg/dL     Review of Systems      10 point review of systems was reviewed and negative other than HPI. Physical Exam    Based off 1997 Exam Criteria    There were no vitals taken for this visit. Constitutional:       General: He is not in acute distress. Appearance: Normal appearance. Cardiovascular:      Rate and Rhythm: Normal rate and regular rhythm. Pulses: Normal pulses. Pulmonary:      Effort: Pulmonary effort is normal. No respiratory distress. Neurological:      Mental Status: He is alert and oriented to person, place, and time. Mental status is at baseline.      Musculoskeletal:  Gait:  antalgic    Spine / Hip Exam:      RIGHT  LEFT    Lumbar Spine Exam  [x] All Neg    [x] All Neg     Straight leg raise  []  []Not tested   []  []Not tested    Clonus  []  []Not tested   []  []Not tested    Pain with motion  []  []Not tested   []  []Not tested    Radiculopathy  []  []Not tested   []  []Not tested    Paraspinal muscle tenderness  [] Paraspinal  []Midline   [] Paraspinal  []Midline   Sensation RIGHT  LEFT    L3  [x] Normal []Decreased    [x] Normal []Decreased   L4  [x] Normal  []Decreased   [x] Normal []Decreased   L5  [x] Normal []Decreased   [x] Normal []Decreased   S1  [x] Normal  []Decreased   [x] Normal []Decreased   Pelvis       Scoliosis  [x] Nml  [] Present     Leg-length discrepency  [x] Equal  [] Right longer   [] Left longer   Range of Motion Active Passive Active Passive   Hip Flexion 90  120    Abduction 50  50    External Rotation @ 90 flex 35  45    Internal Rotation @ 90 flex -10  0           Hip Impingement / Dysplasia  [] All Neg  [] Not tested   [x] All Neg  [] Not tested    Hip impingement test  [x]  []Not tested   []  []Not tested    C-sign  [x]  []Not tested   []  []Not tested    Anterior instability apprehension  []  []Not tested   []  []Not tested    Posterior instability apprehension  []  []Not tested   []  []Not tested    Uncontained Internal rotation  []  []Not tested  []  []Not tested          Abductors  [] All Neg  [] Not tested   [x] All Neg  [] Not tested    Medius weakness  [x]  []Not tested   []  []Not tested    Minimum weakness  []  []Not tested   []  []Not tested    IT band tendonitis  []  []Not tested   []  []Not tested    Trochanteric tenderness  [x]  []Not tested  []  []Not tested   Sciatic neuropathic pain  []  []Not tested   []  []Not tested           Post-arthroplasty  [] All Neg  [x] Not tested   [] All Neg  [x] Not tested    Rectus tendonitis  []  []Not tested   []  []Not tested    Iliopsoas tendonitis       Start-up pain  []  []Not tested   []  []Not tested      Significantly positive Stinchfield test.  Significantly limited range of motion of the hip. Compensatory external rotation present. No leg length difference. Imaging       No new imaging today. LABS:  Reviewed all preoperative blood work which seems to be in good standing. Assessment and Plan    Diagnoses and all orders for this visit:    Primary osteoarthritis of right hip    Contracture of right hip    Hip abductor tendinitis, right        I discussed with Yazmin Sun that her history, symptoms, signs and imaging are most consistent with hip arthritis and Possible abductor tear    Conservative measures have failed. She is not interested in cortisone injections. I think she is an appropriate candidate for surgery due to her ongoing symptoms and dysfunction despite conservative measures. The procedure would be  29663 Total Hip Arthroplasty, posterior approach  Possible right hip abductor repair    Perioperative considerations include: Pre-operative clearance from medical subspecialty. We reviewed the risks, benefits, alternatives of this approach. We discussed risks including, but not limited to, bleeding, pain, infection, scarring, damage to the neurovascular structures, blood clots, pulmonary embolus, stiffness, implant instability or loosening, implant failure, incomplete relief of pain, and incomplete return of function.     We also reviewed the surgical

## 2020-12-04 NOTE — TELEPHONE ENCOUNTER
I have a letter I can email or fax to you that you take to get the handicap placard. He's giving you to surgery and 6 weeks after (so till 2/28/21).     Also order for Prehab PT at [facility] is inplace so you can call them anytime to schedule that appointment: [phone]

## 2020-12-07 ENCOUNTER — TELEPHONE (OUTPATIENT)
Dept: ORTHOPEDIC SURGERY | Age: 60
End: 2020-12-07

## 2020-12-07 NOTE — TELEPHONE ENCOUNTER
CPT: 71632  BODY PART: right hip  AUTHORIZATION: approved    Faxed clinicals to [facility] 12/7/20    Approved as outpatient #[id number] 1/11/21-4/11/21

## 2020-12-10 ENCOUNTER — HOSPITAL ENCOUNTER (OUTPATIENT)
Dept: PHYSICAL THERAPY | Age: 60
Setting detail: THERAPIES SERIES
Discharge: HOME | End: 2020-12-10
Payer: COMMERCIAL

## 2020-12-10 PROCEDURE — 97161 PT EVAL LOW COMPLEX 20 MIN: CPT

## 2020-12-10 PROCEDURE — 97110 THERAPEUTIC EXERCISES: CPT

## 2020-12-10 NOTE — PLAN OF CARE
Outpatient Physical Therapy  Phone: [phone] Fax: [phone]    To: Referring Practitioner: Dr. Amada Hodges  From: Helder Jung PT   Date: 12/10/2020  Patient: Christiana Seth     : 1960 MRN: [id number]  Diagnosis: Diagnosis: M16.11 (ICD-10-CM) - Primary osteoarthritis of right hip; M24.551 (ICD-10-CM) - Contracture of right hip; M76.891 (ICD-10-CM) - Hip abductor tendinitis, right   Treatment Diagnosis: Treatment Diagnosis: R hip pain     Physical Therapy Certification/Re-Certification Form  Dear Dr. Amada Hodges:  The following patient has been evaluated for physical therapy services and for therapy to continue, Medicare requires monthly physician review of the treatment plan. Please review the attached evaluation and/or summary of the patient's plan of care, and verify that you agree therapy should continue by signing the attached document and sending it back to our office.     Plan of Care/Treatment to date:  [x] Therapeutic Exercise (Review/Progress HEP and provide verbal/tactile cueing for activities related to strengthening, flexibility,  endurance, ROM.)       [x] Therapeutic Activity (Provide verbal/tactile cueing for dynamic activities to promote functional tasks.)          [x] Gait Training (Provide verbal/tactile/visual cueing for facilitation of normalized gait pattern without or with the least restrictive AD to decrease pain and/or risk for falling.)          [x] Neuromuscular Re-education (Review/Progress HEP and provide verbal/tactile cueing for activities related to improving balance, coordination, kinesthetic sense, posture, motor skill, proprioception.)         [x] Manual Therapy (Provide manual therapy to mobilize soft tissue/joints for the purpose of modulating pain, promoting relaxation, increasing ROM, reducing/eliminating soft tissue swelling/inflammation/tightness, improving soft tissue extensibility)   [] Dry Needling    [] Aquatic Therapy (Facilitate muscle relaxation and increases peripheral circulation; stimulates body awareness, balance, and trunk stability.)                  [x] Modalities (For modulating pain/tenderness/paresthesias, reducing swelling/inflammation/tightness, improving soft tissue extensibility, and/or to increase muscle tone/strength):     [] Ultrasound  [] Electrical Stimulation        [] Cervical Traction [] Lumbar Traction       [] Cold/hotpack [] Iontophoresis   Other:      []          []      Assessment:  Conditions Requiring Skilled Therapeutic Intervention  Body structures, Functions, Activity limitations: Increased pain  Assessment: Pt presents to PT clinic this date with c/o R hip pain. Pt is scheduled for R NATASHA on Jan 11, 2021, and is at outpatient PT for prehab. Pt presents with significant weakness and PROM limitations in R hip. Pt also presents with antalgic gait pattern, and reports difficulty performing dressing ADLs at home d/t strength and ROM limitations. Feel pt will benefit from skilled physical therapy services to address above limitations and return to PLOF. Treatment Diagnosis: R hip pain  Prognosis: Good  Decision Making: Low Complexity  Barriers to Learning: none  REQUIRES PT FOLLOW UP: Yes    Goals:  Short term goals  Time Frame for Short term goals: In 2 weeks, pt will  Short term goal 1: Demonstrate improved R hip flexion strength to 3/5  Short term goal 2: Demonstrate improved R hip flexion PROM to 100 degrees  Short term goal 3: Demonstrate improved R hip abd PROM to 30 degrees  Short term goal 4: Demonstrate improved R hip abd strength to 3/5  Short term goal 5: Improve LEFS score to >35/80  Long term goals  Time Frame for Long term goals :  In 4 weeks, pt will  Long term goal 1: Demonstrate improved R hip flexion strength to 4/5  Long term goal 2: Demonstrate improved R hip flexion PROM to 110 degrees  Long term goal 3: Demonstrate improved R hip abd PROM to 35 degrees  Long term goal 4: Demonstrate improved R hip abd strength to 4/5  Long term goal 5: Improve LEFS score to >45/80    Frequency/Duration:  # Days per week: [] 1 day # Weeks: [] 1 week [] 5 weeks     [x] 2 days   [] 2 weeks [x] 6 weeks     [] 3 days   [] 3 weeks [] 7 weeks     [] 4 days   [] 4 weeks [] 8 weeks    Rehab Potential: [] Excellent [x] Good [] Fair  [] Poor       Electronically signed by:  Amparo Banks PT        If you have any questions or concerns, please don't hesitate to call.   Thank you for your referral.      Physician Signature:________________________________Date:__________________  By signing above, therapists plan is approved by physician

## 2020-12-10 NOTE — PROGRESS NOTES
Physical Therapy  Initial Assessment  Date: 12/10/2020  Patient Name: Sandra Gan  MRN: [id number]  : 1960     Treatment Diagnosis: R hip pain    Restrictions       Subjective   General  Chart Reviewed: Yes  Patient assessed for rehabilitation services?: Yes  Additional Pertinent Hx: hypothyroid, migraines, LBP  Response To Previous Treatment: Not applicable  Family / Caregiver Present: No  Referring Practitioner: Dr. Shawanda Menard  Referral Date : 20  Diagnosis: M16.11 (ICD-10-CM) - Primary osteoarthritis of right hip; M24.551 (ICD-10-CM) - Contracture of right hip; M76.891 (ICD-10-CM) - Hip abductor tendinitis, right  Follows Commands: Within Functional Limits  General Comment  Comments: PLOF: Independent working adult  Subjective  Subjective: Pt presents to PT clinic this date with c/o R hip pain. Pt reports onset of pain in 2020, and pain has progressively worsened since. Pain does wake pt up at night when she attempts to change positions. Pt currently taking ibuprofen 800 and tylenol arthritis 2-3 times a day. Pt also taking ultram for pain relief. Pt reports increased pain with walking. Pt reports she would likely be uncomfortable performing stairs but has not had to do them for some time now. Pt reports difficulty with don/doff of shoes and socks as well. Pt also notes difficulty reaching full upright stance position, as it is more comfortable to maintain forward lean at hip. Pt currently uses SPC PRN when pain is bad.        Vision/Hearing  Vision  Vision: Within Functional Limits  Hearing  Hearing: Within functional limits    Orientation  Orientation  Overall Orientation Status: Within Normal Limits    Social/Functional History  Social/Functional History  Lives With: Alone  Type of Home: House  Home Layout: One level;Performs ADL's on one level  Home Access: Level entry  Bathroom Shower/Tub: Walk-in shower  Bathroom Toilet: Standard  Bathroom Equipment: Grab bars in shower;Grab bars around toilet  Home Equipment: Cane  ADL Assistance: Independent  Homemaking Assistance: Independent  Homemaking Responsibilities: Yes  Ambulation Assistance: Independent  Transfer Assistance: Independent  Active : Yes  Mode of Transportation: Car  Occupation: Retired  Type of occupation: nurse  Leisure & Hobbies: travel    Objective     Observation/Palpation  Posture: Good    PROM RLE (degrees)  RLE PROM: Exceptions  R Hip Flexion 0-125: 90  R Hip ABduction 0-45: 24    Strength RLE  Strength RLE: Exception  R Hip Flexion: 2/5  R Hip ABduction: 2/5  R Knee Flexion: 5/5  R Knee Extension: 5/5           Bed mobility  Rolling to Left: Independent  Rolling to Right: Independent  Supine to Sit: Supervision  Sit to Supine: Supervision  Transfers  Sit to Stand: Independent  Stand to sit: Independent  Bed to Chair: Independent  Stand Pivot Transfers: Independent       Ambulation  Ambulation?: Yes  WB Status: WBAT  More Ambulation?: No  Ambulation 1  Surface: level tile  Device: No Device  Assistance: Independent  Quality of Gait: antalgic R LE  Distance: 60, 60                            Assessment   Conditions Requiring Skilled Therapeutic Intervention  Body structures, Functions, Activity limitations: Increased pain  Assessment: Pt presents to PT clinic this date with c/o R hip pain. Pt is scheduled for R NATASHA on Jan 11, 2021, and is at outpatient PT for prehab. Pt presents with significant weakness and PROM limitations in R hip. Pt also presents with antalgic gait pattern, and reports difficulty performing dressing ADLs at home d/t strength and ROM limitations. Feel pt will benefit from skilled physical therapy services to address above limitations and return to PLOF.   Treatment Diagnosis: R hip pain  Prognosis: Good  Decision Making: Low Complexity  Barriers to Learning: none  REQUIRES PT FOLLOW UP: Yes  Activity Tolerance  Activity Tolerance: Patient Tolerated treatment well         Plan   Plan  Times per week: 2  Plan weeks: 4  Current Treatment Recommendations: Strengthening, ROM, Home Exercise Program, Manual Therapy - Joint Manipulation, Manual Therapy - Soft Tissue Mobilization, Stair training, Positioning, Pain Management, Gait Training, Transfer Training, Functional Mobility Training    OutComes Score  LEFS Total Score: 25 (12/10/20 1308)                                             Goals  Short term goals  Time Frame for Short term goals: In 2 weeks, pt will  Short term goal 1: Demonstrate improved R hip flexion strength to 3/5  Short term goal 2: Demonstrate improved R hip flexion PROM to 100 degrees  Short term goal 3: Demonstrate improved R hip abd PROM to 30 degrees  Short term goal 4: Demonstrate improved R hip abd strength to 3/5  Short term goal 5: Improve LEFS score to >35/80  Long term goals  Time Frame for Long term goals :  In 4 weeks, pt will  Long term goal 1: Demonstrate improved R hip flexion strength to 4/5  Long term goal 2: Demonstrate improved R hip flexion PROM to 110 degrees  Long term goal 3: Demonstrate improved R hip abd PROM to 35 degrees  Long term goal 4: Demonstrate improved R hip abd strength to 4/5  Long term goal 5: Improve LEFS score to >45/80       Therapy Time   Individual Concurrent Group Co-treatment   Time In 0815         Time Out 0905         Minutes 50         Timed Code Treatment Minutes: 25 Minutes   2 HARMAN Stiles, PT

## 2020-12-10 NOTE — FLOWSHEET NOTE
Physical Therapy Daily Treatment Note  Date:  12/10/2020    Patient Name:  Demetria Haider    :  1960  MRN: [id number]  Restrictions/Precautions:        Medical/Treatment Diagnosis Information:  · Diagnosis: M16.11 (ICD-10-CM) - Primary osteoarthritis of right hip; M24.551 (ICD-10-CM) - Contracture of right hip; M76.891 (ICD-10-CM) - Hip abductor tendinitis, right  · Treatment Diagnosis: R hip pain  Insurance/Certification information:     Insurance Allowable Visits:    Physician Information:  Referring Practitioner: Dr. Bryce Steele MD Follow-up Visit:   Plan of care signed (Y/N):  Sent to inbox  Visit# / total visits:   Pain level: 0/10 (curently)    Progress Note Due (10 visits or 30 days, whichever is less):  7/3/06  Recertification Note Due (End of POC or 90 days, whichever is less):  21    Subjective:    Subjective  Subjective: Pt presents to PT clinic this date with c/o R hip pain. Pt reports onset of pain in 2020, and pain has progressively worsened since. Pain does wake pt up at night when she attempts to change positions. Pt currently taking ibuprofen 800 and tylenol arthritis 2-3 times a day. Pt also taking ultram for pain relief. Pt reports increased pain with walking. Pt reports she would likely be uncomfortable performing stairs but has not had to do them for some time now. Pt reports difficulty with don/doff of shoes and socks as well. Pt also notes difficulty reaching full upright stance position, as it is more comfortable to maintain forward lean at hip. Pt currently uses SPC PRN when pain is bad. Objective:  ? Observation:   ? Test measurements:      Exercises, Neuro Facilitation & Gait Training [id number], [id number]):   Activity Resistance/Repetitions Other comments   LAQ 10x Cues for technique   Sitting marching 10x Cues for technique   Heel Slides 10x Cues for technique   Supine abduction 10x Cues for technique Hip ext stretch supine 2' Lying flat (attempting to)                                                  Therapeutic Activities (32174):      Home Exercise Program:   Issued (see above exercises)    Manual Treatments (58697):      Modalities:      Timed Code Treatment Minutes:  25 min TE    Total Treatment Minutes:  25 min    Treatment/Activity Tolerance:  [x] Patient tolerated treatment well [] Patient limited by fatigue  [] Patient limited by pain  [] Patient limited by other medical complications  [] Other:     Assessment: Pt presents to PT clinic this date with c/o R hip pain. Pt is scheduled for R NATASHA on Jan 11, 2021, and is at outpatient PT for prehab. Pt presents with significant weakness and PROM limitations in R hip. Pt also presents with antalgic gait pattern, and reports difficulty performing dressing ADLs at home d/t strength and ROM limitations. Feel pt will benefit from skilled physical therapy services to address above limitations and return to PLOF. Prognosis: [x] Good [] Fair  [] Poor    Patient Requires Follow-up: [x] Yes  [] No    Goals:  Short term goals  Time Frame for Short term goals: In 2 weeks, pt will  Short term goal 1: Demonstrate improved R hip flexion strength to 3/5  Short term goal 2: Demonstrate improved R hip flexion PROM to 100 degrees  Short term goal 3: Demonstrate improved R hip abd PROM to 30 degrees  Short term goal 4: Demonstrate improved R hip abd strength to 3/5  Short term goal 5: Improve LEFS score to >35/80  Long term goals  Time Frame for Long term goals :  In 4 weeks, pt will  Long term goal 1: Demonstrate improved R hip flexion strength to 4/5  Long term goal 2: Demonstrate improved R hip flexion PROM to 110 degrees  Long term goal 3: Demonstrate improved R hip abd PROM to 35 degrees  Long term goal 4: Demonstrate improved R hip abd strength to 4/5  Long term goal 5: Improve LEFS score to >45/80    Plan:   Visits per week:  2  # of weeks:  6 [] Continue per plan of care [] Alter current plan (see comments)  [x] Plan of care initiated [] Hold pending MD visit [] Discharge    Plan for Next Session:      Electronically signed by:  Sadi Hunt

## 2020-12-15 ENCOUNTER — HOSPITAL ENCOUNTER (OUTPATIENT)
Dept: PHYSICAL THERAPY | Age: 60
Setting detail: THERAPIES SERIES
Discharge: HOME | End: 2020-12-15
Payer: COMMERCIAL

## 2020-12-15 PROCEDURE — 97116 GAIT TRAINING THERAPY: CPT

## 2020-12-15 PROCEDURE — 97110 THERAPEUTIC EXERCISES: CPT

## 2020-12-15 NOTE — FLOWSHEET NOTE
Physical Therapy Daily Treatment Note  Date:  12/15/2020    Patient Name:  Bao Lozano    :  1960  MRN: [id number]  Restrictions/Precautions:        Medical/Treatment Diagnosis Information:  · Diagnosis: M16.11 (ICD-10-CM) - Primary osteoarthritis of right hip; M24.551 (ICD-10-CM) - Contracture of right hip; M76.891 (ICD-10-CM) - Hip abductor tendinitis, right  · Treatment Diagnosis: R hip pain  Insurance/Certification information:     Insurance Allowable Visits:    Physician Information:  Referring Practitioner: Dr. Edda Harvey MD Follow-up Visit:   Plan of care signed (Y/N):  Sent to inbox  Visit# / total visits:   Pain level: 3/10    Progress Note Due (10 visits or 30 days, whichever is less):    Recertification Note Due (End of POC or 90 days, whichever is less):  21    Subjective:    Subjective  Subjective: Pt presents to PT clinic this date with c/o R hip pain. Pt reports onset of pain in 2020, and pain has progressively worsened since. Pain does wake pt up at night when she attempts to change positions. Pt currently taking ibuprofen 800 and tylenol arthritis 2-3 times a day. Pt also taking ultram for pain relief. Pt reports increased pain with walking. Pt reports she would likely be uncomfortable performing stairs but has not had to do them for some time now. Pt reports difficulty with don/doff of shoes and socks as well. Pt also notes difficulty reaching full upright stance position, as it is more comfortable to maintain forward lean at hip. Pt currently uses SPC PRN when pain is bad. Objective:  ? Observation:   ? Test measurements:     30 sec sit to stand: 8 reps  TU sec  6 min walk test: 1002 ft    Exercises, Neuro Facilitation & Gait Training (99932, [id number], [id number]):   Activity Resistance/Repetitions Other comments   LAQ 10x Cues for technique   Sitting marching 10x Cues for technique   NuStep 10 min L3 Therapeutic Activities (27891):      Home Exercise Program:   Issued (see above exercises)    Manual Treatments (63154):      Modalities:      Timed Code Treatment Minutes:  15 min gait training, 23 min TE     Total Treatment Minutes:  38 min    Treatment/Activity Tolerance:  [x] Patient tolerated treatment well [] Patient limited by fatigue  [] Patient limited by pain  [] Patient limited by other medical complications  [] Other:     Assessment: Pt presents to PT clinic this date with c/o R hip pain. Pt is scheduled for R NATASHA on Jan 11, 2021, and is at outpatient PT for prehab. Performed standardized tests for comparison pre/post NATASHA this date. Pt demonstrates difficulty completing 6' walk test, requiring 3-4 rest breaks. Pt demonstrating significant antalgic gait pattern. Pt requires increased time and rest breaks with all activities d/t pain and weakness. Feel pt will benefit from skilled physical therapy services to address above limitations and return to PLOF. Prognosis: [x] Good [] Fair  [] Poor    Patient Requires Follow-up: [x] Yes  [] No    Goals:  Short term goals  Time Frame for Short term goals: In 2 weeks, pt will  Short term goal 1: Demonstrate improved R hip flexion strength to 3/5  Short term goal 2: Demonstrate improved R hip flexion PROM to 100 degrees  Short term goal 3: Demonstrate improved R hip abd PROM to 30 degrees  Short term goal 4: Demonstrate improved R hip abd strength to 3/5  Short term goal 5: Improve LEFS score to >35/80  Long term goals  Time Frame for Long term goals :  In 4 weeks, pt will  Long term goal 1: Demonstrate improved R hip flexion strength to 4/5  Long term goal 2: Demonstrate improved R hip flexion PROM to 110 degrees  Long term goal 3: Demonstrate improved R hip abd PROM to 35 degrees  Long term goal 4: Demonstrate improved R hip abd strength to 4/5  Long term goal 5: Improve LEFS score to >45/80    Plan:   Visits per week:  2  # of weeks:  6 [x] Continue per plan of care [] Alter current plan (see comments)  [] Plan of care initiated [] Hold pending MD visit [] Discharge    Plan for Next Session:      Electronically signed by:  Leatha Arauz

## 2020-12-17 ENCOUNTER — HOSPITAL ENCOUNTER (OUTPATIENT)
Dept: PHYSICAL THERAPY | Age: 60
Setting detail: THERAPIES SERIES
Discharge: HOME | End: 2020-12-17
Payer: COMMERCIAL

## 2020-12-17 PROCEDURE — 97110 THERAPEUTIC EXERCISES: CPT

## 2020-12-17 NOTE — FLOWSHEET NOTE
Physical Therapy Daily Treatment Note  Date:  2020    Patient Name:  Kurt García    :  1960  MRN: [id number]  Restrictions/Precautions:        Medical/Treatment Diagnosis Information:  · Diagnosis: M16.11 (ICD-10-CM) - Primary osteoarthritis of right hip; M24.551 (ICD-10-CM) - Contracture of right hip; M76.891 (ICD-10-CM) - Hip abductor tendinitis, right  · Treatment Diagnosis: R hip pain  Insurance/Certification information:     Insurance Allowable Visits:    Physician Information:  Referring Practitioner: Dr. Kacy Garsia MD Follow-up Visit:   Plan of care signed (Y/N):  Sent to inbox  Visit# / total visits: 3 /12  Pain level: 3/10    Progress Note Due (10 visits or 30 days, whichever is less):    Recertification Note Due (End of POC or 90 days, whichever is less):  21    Subjective:    Subjective  Subjective: Pt presents to PT clinic this date with c/o R hip pain. Pt reports onset of pain in 2020, and pain has progressively worsened since. Pain does wake pt up at night when she attempts to change positions. Pt currently taking ibuprofen 800 and tylenol arthritis 2-3 times a day. Pt also taking ultram for pain relief. Pt reports increased pain with walking. Pt reports she would likely be uncomfortable performing stairs but has not had to do them for some time now. Pt reports difficulty with don/doff of shoes and socks as well. Pt also notes difficulty reaching full upright stance position, as it is more comfortable to maintain forward lean at hip. Pt currently uses SPC PRN when pain is bad. Objective:  ? Observation:   ? Test measurements:     30 sec sit to stand: 8 reps  TU sec  6 min walk test: 1002 ft    Exercises, Neuro Facilitation & Gait Training (47647, [phone], [id number]):   Activity Resistance/Repetitions Other comments   LAQ 15x Cues for technique   Sitting marching 15x Cues for technique   Heel Slides 15x Cues for technique Supine abduction 15x Cues for technique   Hip ext stretch supine 2' Lying flat (attempting to)   NuStep 10 min L5 Pt fatigues, reports SOB   Runners lunge, R foot behind 3 x 30\" Cues for positioning   Hip ER supine hooklying 15x Red tband   Supine adduction hooklying with ball 15x 5\" hold    10x sit to stand 10x from mat table No UE support                         Therapeutic Activities (19662):      Home Exercise Program:   Issued     Access Code: [email or web address]   URL: [facility]/   Date: 12/17/2020   Prepared by: Ruthy Morrissey     Exercises   Sit to Stand without Arm Support - 10 reps - 1 sets - 2x daily - 7x weekly   Seated March - 10 reps - 1 sets - 2x daily - 7x weekly   Seated Long Arc Quad - 10 reps - 1 sets - 2x daily - 7x weekly   Supine Heel Slide - 10 reps - 1 sets - 2x daily - 7x weekly   Hooklying Clamshell with Resistance - 10 reps - 1 sets - 2x daily - 7x weekly   Supine Hip Abduction - 10 reps - 1 sets - 2x daily - 7x weekly   Static Lunge - 3 reps - 1 sets - 30 sec hold - 2x daily - 7x weekly     Manual Treatments (98042):      Modalities:      Timed Code Treatment Minutes:  45 min TE     Total Treatment Minutes:  45 min    Treatment/Activity Tolerance:  [x] Patient tolerated treatment well [] Patient limited by fatigue  [] Patient limited by pain  [] Patient limited by other medical complications  [] Other:     Assessment: Pt presents to PT clinic this date with c/o R hip pain. Pt is scheduled for R NATASHA on Jan 11, 2021, and is at outpatient PT for prehab. Progressed pt with therapeutic exercises this date. Continue to focus on R hip extension PROM for improved overall mobility and gait. Encouraged pt to take frequent, short walks and frequent changes of position to promote mobility. Feel pt will benefit from skilled physical therapy services to address above limitations and return to PLOF.     Prognosis: [x] Good [] Fair  [] Poor    Patient Requires Follow-up: [x] Yes  [] No Goals:  Short term goals  Time Frame for Short term goals: In 2 weeks, pt will  Short term goal 1: Demonstrate improved R hip flexion strength to 3/5  Short term goal 2: Demonstrate improved R hip flexion PROM to 100 degrees  Short term goal 3: Demonstrate improved R hip abd PROM to 30 degrees  Short term goal 4: Demonstrate improved R hip abd strength to 3/5  Short term goal 5: Improve LEFS score to >35/80  Long term goals  Time Frame for Long term goals :  In 4 weeks, pt will  Long term goal 1: Demonstrate improved R hip flexion strength to 4/5  Long term goal 2: Demonstrate improved R hip flexion PROM to 110 degrees  Long term goal 3: Demonstrate improved R hip abd PROM to 35 degrees  Long term goal 4: Demonstrate improved R hip abd strength to 4/5  Long term goal 5: Improve LEFS score to >45/80    Plan:   Visits per week:  2  # of weeks:  6    [x] Continue per plan of care [] Alter current plan (see comments)  [] Plan of care initiated [] Hold pending MD visit [] Discharge    Plan for Next Session:      Electronically signed by:  Kathya Louis

## 2020-12-21 ENCOUNTER — HOSPITAL ENCOUNTER (OUTPATIENT)
Dept: PHYSICAL THERAPY | Age: 60
Setting detail: THERAPIES SERIES
Discharge: HOME | End: 2020-12-21
Payer: COMMERCIAL

## 2020-12-21 PROCEDURE — 97110 THERAPEUTIC EXERCISES: CPT | Performed by: PHYSICAL THERAPIST

## 2020-12-21 NOTE — FLOWSHEET NOTE
Physical Therapy Daily Treatment Note  Date:  2020    Patient Name:  Dre Sawyer    :  1960  MRN: [id number]  Restrictions/Precautions:        Medical/Treatment Diagnosis Information:  · Diagnosis: M16.11 (ICD-10-CM) - Primary osteoarthritis of right hip; M24.551 (ICD-10-CM) - Contracture of right hip; M76.891 (ICD-10-CM) - Hip abductor tendinitis, right  · Treatment Diagnosis: R hip pain  Insurance/Certification information:  [facility] Visits:  20 per calendar year  Physician Information:  Referring Practitioner: Dr. Giovana Bhagat MD Follow-up Visit:   Plan of care signed (Y/N):  Yes, cosigned on 2020  Visit# / total visits:   Pain level: 3-4/10 at rest    Progress Note Due (10 visits or 30 days, whichever is less):  66  Recertification Note Due (End of POC or 90 days, whichever is less):  21    Subjective:  Patient reports that she is sore today, but overall doing well. Objective:  ? Observation: Attempted Standing Hip Abduction, but was too painful and stopped. ? Test measurements:     30 sec sit to stand: 8 reps  TU sec  6 min walk test: 1002 ft    Exercises, Neuro Facilitation & Gait Training (51632, [id number], [id number]): Activity Resistance/Repetitions Other comments   LAQ 20x PT cues for technique   Sitting marching 20x alternating PT cues for technique   Heel Slides 15x PT cues for technique   Supine abduction 15x PT cues for technique   Hip ext stretch supine     NuStep 10 min L5 Tolerated well. PT cues for SPM, and assists with set-up   Runners lunge, R foot behind 3 x 30\" PT cues for positioning   Hip ER supine hooklying 20x Red tband   Supine adduction hooklying with ball 20x 5\" hold Blue ball between knees   10x sit to stand  No UE support   Mini-squats 10x PT cues for position, keeping hips back.                      Therapeutic Activities (05951):      Home Exercise Program:   Issued     Access Code: [email or web address] URL: [email or web address] [email or web address]/   Date: 12/17/2020   Prepared by: Amparo Banks     Exercises   Sit to Stand without Arm Support - 10 reps - 1 sets - 2x daily - 7x weekly   Seated March - 10 reps - 1 sets - 2x daily - 7x weekly   Seated Long Arc Quad - 10 reps - 1 sets - 2x daily - 7x weekly   Supine Heel Slide - 10 reps - 1 sets - 2x daily - 7x weekly   Hooklying Clamshell with Resistance - 10 reps - 1 sets - 2x daily - 7x weekly   Supine Hip Abduction - 10 reps - 1 sets - 2x daily - 7x weekly   Static Lunge - 3 reps - 1 sets - 30 sec hold - 2x daily - 7x weekly     Manual Treatments (40883):      Modalities:      Timed Code Treatment Minutes:  54 min TE     Total Treatment Minutes:  54 min    Treatment/Activity Tolerance:  [x] Patient tolerated treatment well [] Patient limited by fatigue  [] Patient limited by pain  [] Patient limited by other medical complications  [] Other:     Assessment: Pt presents to PT clinic this date with c/o R hip pain. Pt is scheduled for R NATASHA on Jan 11, 2021, and is at outpatient PT for prehab. Progressed pt with therapeutic exercises this date. Continue to focus on R hip extension PROM for improved overall mobility and gait. Encouraged pt to take frequent, short walks and frequent changes of position to promote mobility. Feel pt will benefit from skilled physical therapy services to address above limitations and return to PLOF. Prognosis: [x] Good [] Fair  [] Poor    Patient Requires Follow-up: [x] Yes  [] No    Goals:  Short term goals  Time Frame for Short term goals:  In 2 weeks, pt will  Short term goal 1: Demonstrate improved R hip flexion strength to 3/5  Short term goal 2: Demonstrate improved R hip flexion PROM to 100 degrees  Short term goal 3: Demonstrate improved R hip abd PROM to 30 degrees  Short term goal 4: Demonstrate improved R hip abd strength to 3/5  Short term goal 5: Improve LEFS score to >35/80  Long term goals Time Frame for Long term goals :  In 4 weeks, pt will  Long term goal 1: Demonstrate improved R hip flexion strength to 4/5  Long term goal 2: Demonstrate improved R hip flexion PROM to 110 degrees  Long term goal 3: Demonstrate improved R hip abd PROM to 35 degrees  Long term goal 4: Demonstrate improved R hip abd strength to 4/5  Long term goal 5: Improve LEFS score to >45/80    Plan:   Visits per week:  2  # of weeks:  6    [x] Continue per plan of care [] Alter current plan (see comments)  [] Plan of care initiated [] Hold pending MD visit [] Discharge    Plan for Next Session:  Progress strength, endurance, flexibility    Electronically signed by: , PT [id number]       Vangie Trujillo

## 2020-12-22 ENCOUNTER — HOSPITAL ENCOUNTER (OUTPATIENT)
Dept: PHYSICAL THERAPY | Age: 60
Setting detail: THERAPIES SERIES
Discharge: HOME | End: 2020-12-22
Payer: COMMERCIAL

## 2020-12-22 PROCEDURE — 97110 THERAPEUTIC EXERCISES: CPT | Performed by: PHYSICAL THERAPIST

## 2020-12-22 NOTE — FLOWSHEET NOTE
Physical Therapy Daily Treatment Note  Date:  2020    Patient Name:  Derian Knox    :  1960  MRN: [id number]  Restrictions/Precautions:        Medical/Treatment Diagnosis Information:  · Diagnosis: M16.11 (ICD-10-CM) - Primary osteoarthritis of right hip; M24.551 (ICD-10-CM) - Contracture of right hip; M76.891 (ICD-10-CM) - Hip abductor tendinitis, right  · Treatment Diagnosis: R hip pain   Insurance/Certification information:  [address] [place] [address] Visits:  20 per calendar year  Physician Information:  Referring Practitioner: Dr. Makayla Lozada MD Follow-up Visit:   Plan of care signed (Y/N):  Yes, cosigned on 2020  Visit# / total visits:   Pain level: 4/10 at rest    Progress Note Due (10 visits or 30 days, whichever is less):  94  Recertification Note Due (End of POC or 90 days, whichever is less):  21    Subjective:  Patient c/o soreness in R hip and low back today. States she was moving some boxes which irritated her back. Objective:  ? Observation: Soreness and fatigue after LAQ with added weight. Otherwise tolerated increased resistance well. ? Test measurements:        Exercises, Neuro Facilitation & Gait Training (73000, [id number]): Activity Resistance/Repetitions Other comments   LAQ 20x with 1.5# ankle weight PT cues for technique   Sitting marching 20x alternating PT cues for technique   Heel Slides 20x PT cues for technique   Supine abduction 20x PT cues for technique   Hip ext stretch supine     NuStep 10 min L5 Tolerated well. PT cues for SPM, and assists with set-up   Runners lunge, R foot behind 3 x 30\" PT cues for positioning   Hip ER supine hooklying 20x Green TBand   Supine adduction hooklying with ball 20x 5\" hold Green Foam ball between knees   Sit to stand 10x with arms outstretched No UE support   Mini-squats                      Therapeutic Activities (96400):  Discussed benefits of heat vs ice.      Home Exercise Program:   Issued Access Code: [email or web address]   URL: [email or web address]   Date: 12/17/2020   Prepared by: Zeb Garcia     Exercises   Sit to Stand without Arm Support - 10 reps - 1 sets - 2x daily - 7x weekly   Seated March - 10 reps - 1 sets - 2x daily - 7x weekly   Seated Long Arc Quad - 10 reps - 1 sets - 2x daily - 7x weekly   Supine Heel Slide - 10 reps - 1 sets - 2x daily - 7x weekly   Hooklying Clamshell with Resistance - 10 reps - 1 sets - 2x daily - 7x weekly   Supine Hip Abduction - 10 reps - 1 sets - 2x daily - 7x weekly   Static Lunge - 3 reps - 1 sets - 30 sec hold - 2x daily - 7x weekly     Manual Treatments (65693):  n/a    Modalities:  n/a    Timed Code Treatment Minutes:  48 min TE     Total Treatment Minutes:  48 min    Treatment/Activity Tolerance:  [x] Patient tolerated treatment well [] Patient limited by fatigue  [] Patient limited by pain  [] Patient limited by other medical complications  [] Other:     Assessment: Pt presents to PT clinic this date with c/o R hip pain. Pt is scheduled for R NATASHA on Jan 11, 2021, and is at outpatient PT for prehab. Progressed pt with therapeutic exercises this date. Continue to focus on R hip extension PROM for improved overall mobility and gait. Encouraged pt to take frequent, short walks and frequent changes of position to promote mobility. Feel pt will benefit from skilled physical therapy services to address above limitations and return to PLOF. Prognosis: [x] Good [] Fair  [] Poor    Patient Requires Follow-up: [x] Yes  [] No    Goals:  Short term goals  Time Frame for Short term goals:  In 2 weeks, pt will  Short term goal 1: Demonstrate improved R hip flexion strength to 3/5  Short term goal 2: Demonstrate improved R hip flexion PROM to 100 degrees  Short term goal 3: Demonstrate improved R hip abd PROM to 30 degrees  Short term goal 4: Demonstrate improved R hip abd strength to 3/5  Short term goal 5: Improve LEFS score to >35/80  Long term goals

## 2020-12-28 ENCOUNTER — HOSPITAL ENCOUNTER (OUTPATIENT)
Dept: PHYSICAL THERAPY | Age: 60
Setting detail: THERAPIES SERIES
Discharge: HOME | End: 2020-12-28
Payer: COMMERCIAL

## 2020-12-28 LAB
ANION GAP SERPL CALCULATED.3IONS-SCNC: 15 MMOL/L (ref 3–16)
BASOPHILS ABSOLUTE: 0 K/UL (ref 0–0.2)
BASOPHILS RELATIVE PERCENT: 0.6 %
BILIRUBIN URINE: NEGATIVE
BLOOD, URINE: NEGATIVE
BUN BLDV-MCNC: 18 MG/DL (ref 7–20)
CALCIUM SERPL-MCNC: 10 MG/DL (ref 8.3–10.6)
CHLORIDE BLD-SCNC: 100 MMOL/L (ref 99–110)
CLARITY: CLEAR
CO2: 28 MMOL/L (ref 21–32)
COLOR: YELLOW
CREAT SERPL-MCNC: 0.7 MG/DL (ref 0.6–1.2)
EOSINOPHILS ABSOLUTE: 0.2 K/UL (ref 0–0.6)
EOSINOPHILS RELATIVE PERCENT: 2.7 %
GFR AFRICAN AMERICAN: >60
GFR NON-AFRICAN AMERICAN: >60
GLUCOSE BLD-MCNC: 89 MG/DL (ref 70–99)
GLUCOSE URINE: NEGATIVE MG/DL
HCT VFR BLD CALC: 47.5 % (ref 36–48)
HEMOGLOBIN: 15.7 G/DL (ref 12–16)
INR BLD: 0.92 (ref 0.86–1.14)
KETONES, URINE: NEGATIVE MG/DL
LEUKOCYTE ESTERASE, URINE: NEGATIVE
LYMPHOCYTES ABSOLUTE: 1.4 K/UL (ref 1–5.1)
LYMPHOCYTES RELATIVE PERCENT: 24 %
MCH RBC QN AUTO: 30.3 PG (ref 26–34)
MCHC RBC AUTO-ENTMCNC: 33 G/DL (ref 31–36)
MCV RBC AUTO: 92 FL (ref 80–100)
MICROSCOPIC EXAMINATION: NORMAL
MONOCYTES ABSOLUTE: 0.4 K/UL (ref 0–1.3)
MONOCYTES RELATIVE PERCENT: 7 %
NEUTROPHILS ABSOLUTE: 3.8 K/UL (ref 1.7–7.7)
NEUTROPHILS RELATIVE PERCENT: 65.7 %
NITRITE, URINE: NEGATIVE
PDW BLD-RTO: 14.7 % (ref 12.4–15.4)
PH UA: 7 (ref 5–8)
PLATELET # BLD: 224 K/UL (ref 135–450)
PMV BLD AUTO: 10.1 FL (ref 5–10.5)
POTASSIUM SERPL-SCNC: 4.1 MMOL/L (ref 3.5–5.1)
PREALBUMIN: 30.8 MG/DL (ref 20–40)
PROTEIN UA: NEGATIVE MG/DL
PROTHROMBIN TIME: 10.7 SEC (ref 10–13.2)
RBC # BLD: 5.16 M/UL (ref 4–5.2)
SODIUM BLD-SCNC: 143 MMOL/L (ref 136–145)
SPECIFIC GRAVITY UA: 1.02 (ref 1–1.03)
URINE TYPE: NORMAL
UROBILINOGEN, URINE: 0.2 E.U./DL
WBC # BLD: 5.8 K/UL (ref 4–11)

## 2020-12-28 PROCEDURE — 97110 THERAPEUTIC EXERCISES: CPT

## 2020-12-29 ENCOUNTER — OFFICE VISIT (OUTPATIENT)
Dept: FAMILY MEDICINE CLINIC | Age: 60
End: 2020-12-29
Payer: COMMERCIAL

## 2020-12-29 VITALS
TEMPERATURE: 96 F | SYSTOLIC BLOOD PRESSURE: 132 MMHG | DIASTOLIC BLOOD PRESSURE: 80 MMHG | HEIGHT: 65 IN | HEART RATE: 90 BPM | OXYGEN SATURATION: 97 % | WEIGHT: 239.8 LBS | BODY MASS INDEX: 39.95 KG/M2 | RESPIRATION RATE: 11 BRPM

## 2020-12-29 LAB
ESTIMATED AVERAGE GLUCOSE: 114 MG/DL
HBA1C MFR BLD: 5.6 %
URINE CULTURE, ROUTINE: NORMAL

## 2020-12-29 PROCEDURE — G8417 CALC BMI ABV UP PARAM F/U: HCPCS | Performed by: FAMILY MEDICINE

## 2020-12-29 PROCEDURE — 99244 OFF/OP CNSLTJ NEW/EST MOD 40: CPT | Performed by: FAMILY MEDICINE

## 2020-12-29 PROCEDURE — G8484 FLU IMMUNIZE NO ADMIN: HCPCS | Performed by: FAMILY MEDICINE

## 2020-12-29 PROCEDURE — G8427 DOCREV CUR MEDS BY ELIG CLIN: HCPCS | Performed by: FAMILY MEDICINE

## 2020-12-29 PROCEDURE — 93000 ELECTROCARDIOGRAM COMPLETE: CPT | Performed by: FAMILY MEDICINE

## 2020-12-29 NOTE — PROGRESS NOTES
Subjective:    Chief Complaint:     Marly Renteria is a 61 y.o. female who presents for a preoperative physical examination. She is scheduled to have R THR done by Dr. Sharon Leiva at [facility] on 1.11.2021. Pt has been dealing with chronic R hip pain and has failed conservative therapy. Due to persistent sx, will be set for surgery. Pt was told that they may need to repair a tendon. Pt will be in hospital for 2-3d and then transition to Rehab as she lives alone. Pt has some people that can come and help prn. Pt is looking forward to getting done, but does have some moderate anxiety. She struggles with pain all the time and issues with ambulation. Pt working with ortho as well for chronic shoulder pain, and will be seeing ortho in a few weeks after this surgery to determine.       History of Present Illness:        Past Medical History:   Diagnosis Date    Allergic rhinitis, cause unspecified     Attention deficit disorder without mention of hyperactivity 8/30/2011    Cough variant asthma     Cough variant asthma     Depression     Edema     HSV-1 infection     Hypercholesteremia     Hyperglycemia     Hypothyroidism     Lumbago     Migraine headache     Posterior vitreous detachment of left eye     Routine gynecological examination     Stress incontinence 9/28/2017        Review of patient's past surgical history indicates:     Past Surgical History:   Procedure Laterality Date    BREAST LUMPECTOMY      FOOT SURGERY Right 05/2016    fusion of metatarsal    ROTATOR CUFF REPAIR Right 2009    ROTATOR CUFF REPAIR Right 12/2017    TONSILLECTOMY AND ADENOIDECTOMY                                                 No anesthesia complications        Current Outpatient Medications   Medication Sig Dispense Refill    levothyroxine (SYNTHROID) 50 MCG tablet TAKE 1 TABLET BY MOUTH EVERY DAY 30 tablet 5    acetaminophen (TYLENOL) 650 MG extended release tablet Take by mouth      SUMAtriptan (IMITREX) 100 MG tablet TAKE ONE TABLET BY MOUTH AT ONSET OF HEADACHE; MAY REPEAT ONE TABLET IN 2 HOURS IF NEEDED. MAX 2 TABLETS IN 24 HOURS. 9 tablet 4    clotrimazole-betamethasone (LOTRISONE) 1-0.05 % cream Apply topically 2 times daily. 45 g 0    atorvastatin (LIPITOR) 20 MG tablet TAKE 1 TABLET BY MOUTH EVERY DAY 90 tablet 3    potassium chloride (KLOR-CON 10) 10 MEQ extended release tablet TAKE 1 TABLET BY MOUTH TWICE A  tablet 1    montelukast (SINGULAIR) 10 MG tablet Take 1 tablet by mouth daily 90 tablet 3    furosemide (LASIX) 20 MG tablet TAKE 1 TABLET BY MOUTH TWICE A  tablet 1    NONFORMULARY Take 1 tablet by mouth nightly as needed Indications: Hylands leg cramps      aspirin 81 MG tablet Take 81 mg by mouth daily      ibuprofen (ADVIL;MOTRIN) 600 MG tablet Take 800 mg by mouth 2 times daily       Multiple Vitamin (MULTI-VITAMIN DAILY PO) Take  by mouth.        Current Facility-Administered Medications   Medication Dose Route Frequency Provider Last Rate Last Admin    triamcinolone acetonide (KENALOG-40) injection 40 mg  40 mg Intra-articular Once Herbie Hanley MD           Allergies   Allergen Reactions    Flexeril [Cyclobenzaprine] Other (See Comments)     Can't think straight and makes her extremely sedated    Levaquin [Levofloxacin In D5w]      Muscle pain    Nickel Rash       Social History     Tobacco Use    Smoking status: Former Smoker     Packs/day: 1.00     Years: 16.00     Pack years: 16.00     Types: Cigarettes     Quit date: 1992     Years since quittin.0    Smokeless tobacco: Never Used   Substance Use Topics    Alcohol use: No    Drug use: No        Family History   Problem Relation Age of Onset    Brain Cancer Mother     Hypertension Mother     Coronary Art Dis Brother     Diabetes Brother     Bipolar Disorder Brother     Coronary Art Dis Other     Hypertension Other     Cancer Sister     Hypertension Father     Coronary Art Dis Father         Review Of Systems    Skin: no abnormal pigmentation, rash, scaling, itching, masses, hair or nail changes  Eyes: negative  Ears/Nose/Throat: negative  Respiratory: negative  Cardiovascular: negative  Gastrointestinal: negative  Genitourinary: negative  Musculoskeletal: + as above   Neurologic: negative  Psychiatric: negative  Hematologic/Lymphatic/Immunologic: negative  Endocrine: negative       Objective:      /80   Pulse 90   Temp 96 °F (35.6 °C) (Temporal)   Resp 11   Ht 5' 5.35\" (1.66 m)   Wt 239 lb 12.8 oz (108.8 kg)   SpO2 97%   BMI 39.47 kg/m²   General appearance - healthy, alert, no distress  Skin - Skin color, texture, turgor normal. No rashes or lesions. Head - Normocephalic. No masses, lesions, tenderness or abnormalities  Eyes - conjunctivae/corneas clear. PERRL, EOM's intact. Ears - External ears normal. Canals clear. TM's normal.  Nose/Sinuses - Nares normal. Septum midline. Mucosa normal. No drainage or sinus tenderness. Oropharynx - Lips, mucosa, and tongue normal. Teeth and gums normal.   Neck - Neck supple. No adenopathy. Thyroid symmetric, normal size,  Back - Back symmetric, no curvature. ROM normal. No CVA tenderness. Lungs - Percussion normal. Good diaphragmatic excursion. Lungs clear  Heart - Regular rate and rhythm, with no rub, murmur or gallop noted. Abdomen - Abdomen soft, non-tender. BS normal. No masses, organomegaly  Extremities - Extremities normal. No deformities, edema, or skin discoloration  Musculoskeletal - Spine ROM normal. Muscular strength intact. Peripheral pulses - radial=4/4,, femoral=4/4, popliteal=4/4, dorsalis pedis=4/4,  Neuro - Gait normal. Reflexes normal and symmetric. Sensation grossly normal.  No focal weakness    EKG: normal EKG, normal sinus rhythm, unchanged from previous tracings, normal sinus rhythm.      Orders Only on 12/28/2020   Component Date Value Ref Range Status    Urine Culture, Routine 12/28/2020 No growth at 18 to 36 hours   Final      Lab Results   Component Value Date    CREATININE 0.7 12/28/2020    BUN 18 12/28/2020     12/28/2020    K 4.1 12/28/2020     12/28/2020    CO2 28 12/28/2020       Lab Results   Component Value Date    LABA1C 5.6 12/28/2020     Lab Results   Component Value Date    .0 12/28/2020         ASSESSMENT / PLAN:    1. Pre-op exam  Medically cleared for surgery. - EKG 12 Lead    2. Primary osteoarthritis of right hip  Chronic progressive sx, lack of benefit with conservative therapy  Set for surgery  Medically cleared for surgery. 3. Recurrent major depressive disorder, in full remission ([facility] [address]  Mood doing ok despite stressors  Cont supportive therapy and will monitor    4. Hyperglycemia  Follow up a1c normal    5. Hypercholesteremia  Stable @ goal           Follow-up appointment:   After surgery/prn    Discussed use, benefit, and side effects of all prescribed medications. Barriers to medication compliance addressed. All patient questions answered. Pt voiced understanding. When applicable, patient's outside records were reviewed through [facility]. The patient has signed appropriate paperworks/consents. Per encounter diagnoses   She is medically cleared for surgery and anesthesia. Avoid Aspirin, non steroidal anti inflammatory medications, including Motrin, Aleve, Ibuprofen, Advil; multi vitamins, Vitamin E, omega 3 fish oil, and glucosamine chondroitin for the 7 days prior to surgery.

## 2020-12-30 RX ORDER — FAMOTIDINE, CALCIUM CARBONATE, AND MAGNESIUM HYDROXIDE 10; 800; 165 MG/1; MG/1; MG/1
TABLET, CHEWABLE ORAL DAILY PRN
COMMUNITY

## 2020-12-30 RX ORDER — TRAMADOL HYDROCHLORIDE 50 MG/1
50 TABLET ORAL EVERY 8 HOURS PRN
Status: ON HOLD | COMMUNITY
End: 2021-01-13 | Stop reason: HOSPADM

## 2020-12-31 ENCOUNTER — HOSPITAL ENCOUNTER (OUTPATIENT)
Dept: PHYSICAL THERAPY | Age: 60
Setting detail: THERAPIES SERIES
Discharge: HOME | End: 2020-12-31
Payer: COMMERCIAL

## 2020-12-31 PROCEDURE — 97116 GAIT TRAINING THERAPY: CPT

## 2020-12-31 PROCEDURE — 97164 PT RE-EVAL EST PLAN CARE: CPT

## 2020-12-31 PROCEDURE — 97112 NEUROMUSCULAR REEDUCATION: CPT

## 2020-12-31 NOTE — FLOWSHEET NOTE
Physical Therapy Daily Treatment Note  Date:  2020    Patient Name:  Yosvany Gordon    :  1960  MRN: [id number]  Restrictions/Precautions:        Medical/Treatment Diagnosis Information:  · Diagnosis: M16.11 (ICD-10-CM) - Primary osteoarthritis of right hip; M24.551 (ICD-10-CM) - Contracture of right hip; M76.891 (ICD-10-CM) - Hip abductor tendinitis, right  · Treatment Diagnosis: R hip pain   Insurance/Certification information:  [facility] Visits:  20 per calendar year  Physician Information:  Referring Practitioner: Dr. Diandra Dumont MD Follow-up Visit:   Plan of care signed (Y/N):  Yes, cosigned on 2020  Visit# / total visits:   Pain level: 1/10     Progress Note Due (10 visits or 30 days, whichever is less):  61  Recertification Note Due (End of POC or 90 days, whichever is less):  21    Subjective:  Pt reports feeling good this date; decreased pain and reports feeling improvement in gait pattern. Objective:  ? Observation: Mild antalgic gait R LE, dec R hip extension during ambulation  ? Test measurements:    6 minute walk test: 1402 ft  30 sec STS: 9 attempts completed  TU.75 sec  Standing balance:    Romberg: 10 sec   Modified tandem: 10 sec   Tandem stance: 10 sec   SLS on R LE: 10 sec    Exercises, Neuro Facilitation & Gait Training (94425, [id number], [id number]): Activity Resistance/Repetitions Other comments                         Therapeutic Activities (96243):      Home Exercise Program:   Issued     Access Code: [email or web address]   URL: [email or web address]   Date: 2020   Prepared by: Matt Donahue     Exercises   Sit to Stand without Arm Support - 10 reps - 1 sets - 2x daily - 7x weekly   Seated March - 10 reps - 1 sets - 2x daily - 7x weekly   Seated Long Arc Quad - 10 reps - 1 sets - 2x daily - 7x weekly   Supine Heel Slide - 10 reps - 1 sets - 2x daily - 7x weekly Long term goal 2: Demonstrate improved R hip flexion PROM to 110 degrees not met (90)  Long term goal 3: Demonstrate improved R hip abd PROM to 35 degrees not met (30)  Long term goal 4: Demonstrate improved R hip abd strength to 4/5 not met (2+/5)  Long term goal 5: Improve LEFS score to >45/80 not met    Plan:   Visits per week:  2  # of weeks:  6    [x] Continue per plan of care [] Alter current plan (see comments)  [] Plan of care initiated [] Hold pending MD visit [] Discharge    Plan for Next Session:  Progress strength, endurance, flexibility    Electronically signed by:   Gume Quintana

## 2021-01-01 ENCOUNTER — PATIENT MESSAGE (OUTPATIENT)
Dept: ORTHOPEDIC SURGERY | Age: 61
End: 2021-01-01

## 2021-01-04 NOTE — TELEPHONE ENCOUNTER
From: Chloe Montejo  To: Indy Hernandez MD  Sent: 1/1/2021 11:35 AM EST  Subject: Visit Follow-Up Question    Hi Zuleima. So, effective today, 1/1/21, my health insurance is [employer]. I think I have already given the card information, but if you don't see it, let me know and can attach it through [address] I want to be sure that the preauthorization for my surgery is done under this new plan. I'm sure you're already on top of it, but why not send a reminder, right? Also, the cooling ice machine and the hip brace. How are those requested? Do I need to do anything on my end? Just let me know when you have a free minute. (That's a joke, first day back after a holiday weekend is SO busy!) Thanks.

## 2021-01-04 NOTE — TELEPHONE ENCOUNTER
Patricia Lies,     I'm pretty sure everything went through on new insurance. Your hip brace should be at the hospital at your discharge. And he normally doesn't do ice machines for hips but your surgery is a little more involved. But if it's recommended, it will need to be picked up here or it may be at the hospital?!    So, I will need to check on all of these and get back to you.     Vikki Carmichael

## 2021-01-05 ENCOUNTER — OFFICE VISIT (OUTPATIENT)
Dept: PULMONOLOGY | Age: 61
End: 2021-01-05
Payer: COMMERCIAL

## 2021-01-05 ENCOUNTER — OFFICE VISIT (OUTPATIENT)
Dept: PRIMARY CARE CLINIC | Age: 61
End: 2021-01-05
Payer: COMMERCIAL

## 2021-01-05 VITALS
TEMPERATURE: 97 F | WEIGHT: 239 LBS | BODY MASS INDEX: 39.82 KG/M2 | OXYGEN SATURATION: 97 % | HEIGHT: 65 IN | HEART RATE: 95 BPM

## 2021-01-05 DIAGNOSIS — Z01.811 PREOP PULMONARY/RESPIRATORY EXAM: Primary | ICD-10-CM

## 2021-01-05 DIAGNOSIS — Z01.818 PREOP EXAMINATION: Primary | ICD-10-CM

## 2021-01-05 DIAGNOSIS — G47.9 SLEEP DISTURBANCE: ICD-10-CM

## 2021-01-05 PROCEDURE — 1036F TOBACCO NON-USER: CPT | Performed by: INTERNAL MEDICINE

## 2021-01-05 PROCEDURE — 99204 OFFICE O/P NEW MOD 45 MIN: CPT | Performed by: INTERNAL MEDICINE

## 2021-01-05 PROCEDURE — G8417 CALC BMI ABV UP PARAM F/U: HCPCS | Performed by: INTERNAL MEDICINE

## 2021-01-05 PROCEDURE — G8417 CALC BMI ABV UP PARAM F/U: HCPCS | Performed by: NURSE PRACTITIONER

## 2021-01-05 PROCEDURE — G8428 CUR MEDS NOT DOCUMENT: HCPCS | Performed by: NURSE PRACTITIONER

## 2021-01-05 PROCEDURE — 99211 OFF/OP EST MAY X REQ PHY/QHP: CPT | Performed by: NURSE PRACTITIONER

## 2021-01-05 PROCEDURE — G8427 DOCREV CUR MEDS BY ELIG CLIN: HCPCS | Performed by: INTERNAL MEDICINE

## 2021-01-05 PROCEDURE — 3017F COLORECTAL CA SCREEN DOC REV: CPT | Performed by: INTERNAL MEDICINE

## 2021-01-05 PROCEDURE — G8484 FLU IMMUNIZE NO ADMIN: HCPCS | Performed by: INTERNAL MEDICINE

## 2021-01-05 RX ORDER — TOPIRAMATE 100 MG/1
100 TABLET, FILM COATED ORAL 2 TIMES DAILY
COMMUNITY
End: 2021-05-11

## 2021-01-05 RX ORDER — ALBUTEROL SULFATE 90 UG/1
2 AEROSOL, METERED RESPIRATORY (INHALATION) EVERY 6 HOURS PRN
Qty: 1 INHALER | Refills: 11 | Status: SHIPPED | OUTPATIENT
Start: 2021-01-05 | End: 2021-04-09 | Stop reason: SDUPTHER

## 2021-01-05 ASSESSMENT — ENCOUNTER SYMPTOMS
SHORTNESS OF BREATH: 0
WHEEZING: 0
CHEST TIGHTNESS: 0
COUGH: 0

## 2021-01-05 ASSESSMENT — SLEEP AND FATIGUE QUESTIONNAIRES
HOW LIKELY ARE YOU TO NOD OFF OR FALL ASLEEP WHILE SITTING AND READING: 2
HOW LIKELY ARE YOU TO NOD OFF OR FALL ASLEEP IN A CAR, WHILE STOPPED FOR A FEW MINUTES IN TRAFFIC: 1
HOW LIKELY ARE YOU TO NOD OFF OR FALL ASLEEP WHEN YOU ARE A PASSENGER IN A CAR FOR AN HOUR WITHOUT A BREAK: 2
HOW LIKELY ARE YOU TO NOD OFF OR FALL ASLEEP WHILE SITTING QUIETLY AFTER LUNCH WITHOUT ALCOHOL: 2
HOW LIKELY ARE YOU TO NOD OFF OR FALL ASLEEP WHILE WATCHING TV: 2

## 2021-01-05 NOTE — PROGRESS NOTES
[facility] Pulmonary and Critical Care    Outpatient Note    Subjective:   CHIEF COMPLAINT:   Chief Complaint   Patient presents with    Pre-op Exam       HPI:     The patient is 61 y.o. female who presents today for a new patient visit for pre op eval.    She has hx of cough variant asthma. This was diagnosed around 2017 and was on Breo for over two years. It helped a lot, but she is not on any inhaler at this time. She is on singulair though. There is no cough, SOB or wheezing. She is not requiring rescue inhaler though she prefers to have it handy. She had prior rotator cuff and foot surgeries without any post op complications. There is no hx of anemia or recurrent chest infections. She is former smoker, quit many years ago. Sleep hx is suggestive of JOSE  Of note she use lasix daily. Prior echo in 2013 with grade 1 diastolic dysfunction     Her usual bed time is 1000pm and rise time is 500am  Sleep onset is usually 2 minutes  Number of wake ups 1. Number of nocturia 1. Activity before bedtime include watch TV  There is history of snoring on and witnessed apneas    Does not feel refreshed in the morning. (not always)  During the daytime the patient is sleepy, and  takes naps. There is frequent morning headache.   Patient drinks 8 cups of coffee daily including in afternoon    ESS is 13    Past Medical History:    Past Medical History:   Diagnosis Date    Acid reflux     Allergic rhinitis, cause unspecified     Arthritis     Attention deficit disorder without mention of hyperactivity 8/30/2011    Cough variant asthma     Cough variant asthma     Depression     Edema     HSV-1 infection     Hypercholesteremia     Hyperglycemia     Hypothyroidism     Lumbago     Migraine headache     Posterior vitreous detachment of left eye     Routine gynecological examination     Stress incontinence 9/28/2017       Social History:    Social History     Tobacco Use Smoking Status Former Smoker    Packs/day: 1.00    Years: 16.00    Pack years: 16.00    Types: Cigarettes    Quit date: 1992    Years since quittin.0   Smokeless Tobacco Never Used       Family History:  Family History   Problem Relation Age of Onset    Brain Cancer Mother 58    Hypertension Mother     Osteoarthritis Brother 67    High Cholesterol Brother     Osteoporosis Sister 71    High Cholesterol Sister     Hyperparathyroidism  Sister     Hypertension Father 61    Coronary Art Dis Father     High Cholesterol Father     High Cholesterol Brother 79    Osteoarthritis Brother     Prostate Cancer Brother     Hypertension Brother 62    Bipolar Disorder Brother     Substance Abuse Brother     Alcohol Abuse Brother     Hypertension Brother 72    Diabetes type 2  Brother     Prostate Cancer Brother     Osteoarthritis Brother     Osteoarthritis Brother 59    Atrial Fibrillation Sister 61    Osteoarthritis Sister     Hypertension Sister     Kidney Disease Sister     Diabetes type 2  Sister     Osteoarthritis Brother 64    Diabetes type 2  Brother     Ulcerative Colitis Brother     Bipolar Disorder Brother     Cancer Brother        Current Medications:  Current Outpatient Medications on File Prior to Visit   Medication Sig Dispense Refill    topiramate (TOPAMAX) 100 MG tablet Take 100 mg by mouth 2 times daily      levothyroxine (SYNTHROID) 50 MCG tablet TAKE 1 TABLET BY MOUTH EVERY DAY 90 tablet 3    furosemide (LASIX) 20 MG tablet TAKE 1 TABLET BY MOUTH TWICE A  tablet 3    montelukast (SINGULAIR) 10 MG tablet Take 1 tablet by mouth daily 90 tablet 3    potassium chloride (KLOR-CON 10) 10 MEQ extended release tablet TAKE 1 TABLET BY MOUTH TWICE A  tablet 3    SUMAtriptan (IMITREX) 100 MG tablet TAKE ONE TABLET BY MOUTH AT ONSET OF HEADACHE; MAY REPEAT ONE TABLET IN 2 HOURS IF NEEDED. MAX 2 TABLETS IN 24 HOURS.  27 tablet 3  atorvastatin (LIPITOR) 20 MG tablet TAKE 1 TABLET BY MOUTH EVERY DAY 90 tablet 3    traMADol (ULTRAM) 50 MG tablet Take 50 mg by mouth every 8 hours as needed for Pain.  Famotidine-Ca Carb-Mag Hydrox (PEPCID COMPLETE) -165 MG CHEW Take by mouth daily as needed      acetaminophen (TYLENOL) 650 MG extended release tablet Take 1,300 mg by mouth 2 times daily as needed       clotrimazole-betamethasone (LOTRISONE) 1-0.05 % cream Apply topically 2 times daily. 45 g 0    NONFORMULARY Take 1 tablet by mouth nightly as needed Indications: Hylands leg cramps      aspirin 81 MG tablet Take 81 mg by mouth daily      ibuprofen (ADVIL;MOTRIN) 600 MG tablet Take 800 mg by mouth every 8 hours as needed for Pain        Current Facility-Administered Medications on File Prior to Visit   Medication Dose Route Frequency Provider Last Rate Last Admin    triamcinolone acetonide (KENALOG-40) injection 40 mg  40 mg Intra-articular Once Marlee Moore MD           REVIEW OF SYSTEMS:    Review of Systems   Constitutional: Negative for chills, fatigue, fever and unexpected weight change. Respiratory: Negative for cough, chest tightness, shortness of breath and wheezing. Cardiovascular: Negative for chest pain, palpitations and leg swelling. Neurological: Negative for weakness. Psychiatric/Behavioral: Positive for sleep disturbance. The patient is not nervous/anxious. All other systems reviewed and are negative. Objective:   PHYSICAL EXAM:        VITALS:  Pulse 95   Temp 97 °F (36.1 °C) (Infrared)   Ht 5' 5\" (1.651 m)   Wt 239 lb (108.4 kg)   LMP 08/26/2019   SpO2 97%   BMI 39.77 kg/m²     Physical Exam  Vitals signs reviewed. Constitutional:       Appearance: Normal appearance. She is well-developed. HENT:      Head: Normocephalic and atraumatic. Eyes:      Extraocular Movements: Extraocular movements intact. Pupils: Pupils are equal, round, and reactive to light.    Neck: Musculoskeletal: Neck supple. Vascular: No JVD. Cardiovascular:      Rate and Rhythm: Normal rate and regular rhythm. Heart sounds: No murmur. Pulmonary:      Effort: Pulmonary effort is normal. No respiratory distress. Breath sounds: Normal breath sounds. No stridor. No wheezing or rales. Abdominal:      General: Bowel sounds are normal.      Palpations: Abdomen is soft. Musculoskeletal:         General: No deformity. Comments: Trace edema bilaterally   Skin:     General: Skin is warm and dry. Neurological:      Mental Status: She is alert and oriented to person, place, and time. Psychiatric:         Behavior: Behavior normal.         DATA:    CT chest on 8/5/17      1.  No focal infiltrate, effusion, or pneumothorax. Minimal left apical bullous disease.       2.  Small 0.3 cm noncalcified nodule medial right lung base, could be followed according to the    Fleischner Society guidelines.       3.  Prominent size right pulmonary artery, of uncertain clinical significance.       4.  Single borderline enlarged pretracheal node. Assessment:      Diagnosis Orders   1. Preop pulmonary/respiratory exam     2. Sleep disturbance         Plan:   61year old female with BMI of 39.7, and hx of cough variant asthma is here for preop pulmonary eval before hip replacement surgery. She is not on any inhaler at this time and asthma is well controlled. There is no hx of recurrent chest infections  No hx of anemia  No prior complications after surgery   Hx is highly suggestive of JOSE with ESS of 13  She has hx of grade 1 diastolic dysfunction. She takes lasix daily   Hx of 3mm pulmonary nodule in 2017  Ex smoker quit smoking in 1992. Smoked for 16 years   ARISCAT risk index 3    Overall she is considered low risk for post op pulmonary complications.   She is cleared from my perspective for surgery   Albuterol rescue inhaler prescribed She will eventually need sleep study after the pandemic.   She will call us when ready

## 2021-01-06 ENCOUNTER — TELEPHONE (OUTPATIENT)
Dept: ORTHOPEDIC SURGERY | Age: 61
End: 2021-01-06

## 2021-01-06 LAB — SARS-COV-2, NAA: NOT DETECTED

## 2021-01-06 NOTE — TELEPHONE ENCOUNTER
General Question     Subject:PAIN CONTROL  Patient: [facility] Number: [phone]  REQUESTING A CALL BACK AS SOON AS POSSIBLE.
Notified that Dr Oren Valencia will only do Tylenol.   DP
Pt off ibuprofen for surgery, needs something for pain/body aches.   She is on Ultram. Routing to Dr Soumya Corrigan.  DP
(3) slightly limited

## 2021-01-07 LAB — CULTURE NOSE: NORMAL

## 2021-01-08 ENCOUNTER — ANESTHESIA EVENT (OUTPATIENT)
Dept: OPERATING ROOM | Age: 61
DRG: 470 | End: 2021-01-08
Payer: COMMERCIAL

## 2021-01-11 ENCOUNTER — HOSPITAL ENCOUNTER (INPATIENT)
Age: 61
LOS: 2 days | Discharge: NURSING, REHAB OR LONG-TERM CARE | DRG: 470 | End: 2021-01-13
Attending: ORTHOPAEDIC SURGERY | Admitting: ORTHOPAEDIC SURGERY
Payer: COMMERCIAL

## 2021-01-11 ENCOUNTER — APPOINTMENT (OUTPATIENT)
Dept: GENERAL RADIOLOGY | Age: 61
DRG: 470 | End: 2021-01-11
Attending: ORTHOPAEDIC SURGERY
Payer: COMMERCIAL

## 2021-01-11 ENCOUNTER — ANESTHESIA (OUTPATIENT)
Dept: OPERATING ROOM | Age: 61
DRG: 470 | End: 2021-01-11
Payer: COMMERCIAL

## 2021-01-11 VITALS
OXYGEN SATURATION: 100 % | DIASTOLIC BLOOD PRESSURE: 67 MMHG | RESPIRATION RATE: 12 BRPM | SYSTOLIC BLOOD PRESSURE: 146 MMHG

## 2021-01-11 DIAGNOSIS — Z96.641 STATUS POST TOTAL REPLACEMENT OF RIGHT HIP: Primary | ICD-10-CM

## 2021-01-11 PROBLEM — M16.11 OSTEOARTHRITIS OF RIGHT HIP: Status: ACTIVE | Noted: 2021-01-11

## 2021-01-11 PROCEDURE — 0SR90JZ REPLACEMENT OF RIGHT HIP JOINT WITH SYNTHETIC SUBSTITUTE, OPEN APPROACH: ICD-10-PCS | Performed by: ORTHOPAEDIC SURGERY

## 2021-01-11 PROCEDURE — 2580000003 HC RX 258: Performed by: ANESTHESIOLOGY

## 2021-01-11 PROCEDURE — 27130 TOTAL HIP ARTHROPLASTY: CPT | Performed by: ORTHOPAEDIC SURGERY

## 2021-01-11 PROCEDURE — 94761 N-INVAS EAR/PLS OXIMETRY MLT: CPT

## 2021-01-11 PROCEDURE — 76942 ECHO GUIDE FOR BIOPSY: CPT | Performed by: ANESTHESIOLOGY

## 2021-01-11 PROCEDURE — 97161 PT EVAL LOW COMPLEX 20 MIN: CPT

## 2021-01-11 PROCEDURE — 97535 SELF CARE MNGMENT TRAINING: CPT

## 2021-01-11 PROCEDURE — 2500000003 HC RX 250 WO HCPCS: Performed by: NURSE ANESTHETIST, CERTIFIED REGISTERED

## 2021-01-11 PROCEDURE — 6360000002 HC RX W HCPCS: Performed by: ORTHOPAEDIC SURGERY

## 2021-01-11 PROCEDURE — 6370000000 HC RX 637 (ALT 250 FOR IP): Performed by: ANESTHESIOLOGY

## 2021-01-11 PROCEDURE — 3600000004 HC SURGERY LEVEL 4 BASE: Performed by: ORTHOPAEDIC SURGERY

## 2021-01-11 PROCEDURE — 6360000002 HC RX W HCPCS: Performed by: NURSE ANESTHETIST, CERTIFIED REGISTERED

## 2021-01-11 PROCEDURE — 1200000000 HC SEMI PRIVATE

## 2021-01-11 PROCEDURE — 2709999900 HC NON-CHARGEABLE SUPPLY: Performed by: ORTHOPAEDIC SURGERY

## 2021-01-11 PROCEDURE — 2500000003 HC RX 250 WO HCPCS: Performed by: ANESTHESIOLOGY

## 2021-01-11 PROCEDURE — 2700000000 HC OXYGEN THERAPY PER DAY

## 2021-01-11 PROCEDURE — C1776 JOINT DEVICE (IMPLANTABLE): HCPCS | Performed by: ORTHOPAEDIC SURGERY

## 2021-01-11 PROCEDURE — 2720000010 HC SURG SUPPLY STERILE: Performed by: ORTHOPAEDIC SURGERY

## 2021-01-11 PROCEDURE — 2780000010 HC IMPLANT OTHER: Performed by: ORTHOPAEDIC SURGERY

## 2021-01-11 PROCEDURE — 97116 GAIT TRAINING THERAPY: CPT

## 2021-01-11 PROCEDURE — 6370000000 HC RX 637 (ALT 250 FOR IP): Performed by: ORTHOPAEDIC SURGERY

## 2021-01-11 PROCEDURE — 3700000001 HC ADD 15 MINUTES (ANESTHESIA): Performed by: ORTHOPAEDIC SURGERY

## 2021-01-11 PROCEDURE — 7100000001 HC PACU RECOVERY - ADDTL 15 MIN: Performed by: ORTHOPAEDIC SURGERY

## 2021-01-11 PROCEDURE — C9290 INJ, BUPIVACAINE LIPOSOME: HCPCS | Performed by: ORTHOPAEDIC SURGERY

## 2021-01-11 PROCEDURE — 6360000002 HC RX W HCPCS: Performed by: ANESTHESIOLOGY

## 2021-01-11 PROCEDURE — 2580000003 HC RX 258: Performed by: ORTHOPAEDIC SURGERY

## 2021-01-11 PROCEDURE — 6370000000 HC RX 637 (ALT 250 FOR IP): Performed by: NURSE PRACTITIONER

## 2021-01-11 PROCEDURE — 97166 OT EVAL MOD COMPLEX 45 MIN: CPT

## 2021-01-11 PROCEDURE — 72170 X-RAY EXAM OF PELVIS: CPT

## 2021-01-11 PROCEDURE — 3700000000 HC ANESTHESIA ATTENDED CARE: Performed by: ORTHOPAEDIC SURGERY

## 2021-01-11 PROCEDURE — 2500000003 HC RX 250 WO HCPCS: Performed by: ORTHOPAEDIC SURGERY

## 2021-01-11 PROCEDURE — 3600000014 HC SURGERY LEVEL 4 ADDTL 15MIN: Performed by: ORTHOPAEDIC SURGERY

## 2021-01-11 PROCEDURE — 7100000000 HC PACU RECOVERY - FIRST 15 MIN: Performed by: ORTHOPAEDIC SURGERY

## 2021-01-11 DEVICE — SHELL ACET SZ E DIA52MM 3 H OSSEOTI LIMIT H 2 MOBILITY G7: Type: IMPLANTABLE DEVICE | Status: FUNCTIONAL

## 2021-01-11 DEVICE — G7 DUAL MOBILITY LINER 42MM E: Type: IMPLANTABLE DEVICE | Status: FUNCTIONAL

## 2021-01-11 DEVICE — UPCHARGE HIP VITAMIN E LINER ZIMMER BIOMET: Type: IMPLANTABLE DEVICE | Status: FUNCTIONAL

## 2021-01-11 DEVICE — BEARING ACET OD42MM ID28MM HIP E1 2 MOBILITY ACT ARTC: Type: IMPLANTABLE DEVICE | Status: FUNCTIONAL

## 2021-01-11 DEVICE — BIOLOX® OPTION, HEAD, S, Ø 28/-3.0, TAPER 12/14
Type: IMPLANTABLE DEVICE | Status: FUNCTIONAL
Brand: BIOLOX® OPTION

## 2021-01-11 DEVICE — STEM FEM STD OFFSET 5 HIP HA AVENIR COMPLETE: Type: IMPLANTABLE DEVICE | Status: FUNCTIONAL

## 2021-01-11 RX ORDER — ONDANSETRON 2 MG/ML
4 INJECTION INTRAMUSCULAR; INTRAVENOUS EVERY 6 HOURS PRN
Status: DISCONTINUED | OUTPATIENT
Start: 2021-01-11 | End: 2021-01-13 | Stop reason: HOSPADM

## 2021-01-11 RX ORDER — OXYCODONE HYDROCHLORIDE AND ACETAMINOPHEN 5; 325 MG/1; MG/1
2 TABLET ORAL PRN
Status: DISCONTINUED | OUTPATIENT
Start: 2021-01-11 | End: 2021-01-11 | Stop reason: HOSPADM

## 2021-01-11 RX ORDER — ATORVASTATIN CALCIUM 10 MG/1
20 TABLET, FILM COATED ORAL NIGHTLY
Status: DISCONTINUED | OUTPATIENT
Start: 2021-01-11 | End: 2021-01-13 | Stop reason: HOSPADM

## 2021-01-11 RX ORDER — HYDRALAZINE HYDROCHLORIDE 20 MG/ML
5 INJECTION INTRAMUSCULAR; INTRAVENOUS EVERY 10 MIN PRN
Status: DISCONTINUED | OUTPATIENT
Start: 2021-01-11 | End: 2021-01-11 | Stop reason: HOSPADM

## 2021-01-11 RX ORDER — ONDANSETRON 4 MG/1
4 TABLET, ORALLY DISINTEGRATING ORAL EVERY 8 HOURS PRN
Status: DISCONTINUED | OUTPATIENT
Start: 2021-01-11 | End: 2021-01-13 | Stop reason: HOSPADM

## 2021-01-11 RX ORDER — LIDOCAINE HYDROCHLORIDE 20 MG/ML
INJECTION, SOLUTION INFILTRATION; PERINEURAL PRN
Status: DISCONTINUED | OUTPATIENT
Start: 2021-01-11 | End: 2021-01-11 | Stop reason: SDUPTHER

## 2021-01-11 RX ORDER — SODIUM CHLORIDE 0.9 % (FLUSH) 0.9 %
10 SYRINGE (ML) INJECTION PRN
Status: DISCONTINUED | OUTPATIENT
Start: 2021-01-11 | End: 2021-01-11 | Stop reason: HOSPADM

## 2021-01-11 RX ORDER — OXYCODONE HYDROCHLORIDE 5 MG/1
5 TABLET ORAL EVERY 4 HOURS PRN
Status: DISCONTINUED | OUTPATIENT
Start: 2021-01-11 | End: 2021-01-13 | Stop reason: HOSPADM

## 2021-01-11 RX ORDER — GABAPENTIN 300 MG/1
300 CAPSULE ORAL ONCE
Status: DISCONTINUED | OUTPATIENT
Start: 2021-01-11 | End: 2021-01-11

## 2021-01-11 RX ORDER — CELECOXIB 100 MG/1
200 CAPSULE ORAL ONCE
Status: DISCONTINUED | OUTPATIENT
Start: 2021-01-11 | End: 2021-01-11

## 2021-01-11 RX ORDER — SODIUM CHLORIDE 0.9 % (FLUSH) 0.9 %
10 SYRINGE (ML) INJECTION PRN
Status: DISCONTINUED | OUTPATIENT
Start: 2021-01-11 | End: 2021-01-13 | Stop reason: HOSPADM

## 2021-01-11 RX ORDER — SODIUM CHLORIDE 0.9 % (FLUSH) 0.9 %
10 SYRINGE (ML) INJECTION EVERY 12 HOURS SCHEDULED
Status: DISCONTINUED | OUTPATIENT
Start: 2021-01-11 | End: 2021-01-11 | Stop reason: HOSPADM

## 2021-01-11 RX ORDER — MEPERIDINE HYDROCHLORIDE 50 MG/ML
12.5 INJECTION INTRAMUSCULAR; INTRAVENOUS; SUBCUTANEOUS EVERY 5 MIN PRN
Status: DISCONTINUED | OUTPATIENT
Start: 2021-01-11 | End: 2021-01-11 | Stop reason: HOSPADM

## 2021-01-11 RX ORDER — OXYCODONE HYDROCHLORIDE AND ACETAMINOPHEN 5; 325 MG/1; MG/1
1 TABLET ORAL PRN
Status: DISCONTINUED | OUTPATIENT
Start: 2021-01-11 | End: 2021-01-11 | Stop reason: HOSPADM

## 2021-01-11 RX ORDER — BUPIVACAINE HYDROCHLORIDE 2.5 MG/ML
INJECTION, SOLUTION EPIDURAL; INFILTRATION; INTRACAUDAL
Status: COMPLETED | OUTPATIENT
Start: 2021-01-11 | End: 2021-01-11

## 2021-01-11 RX ORDER — ASPIRIN 81 MG/1
81 TABLET ORAL 2 TIMES DAILY
Status: DISCONTINUED | OUTPATIENT
Start: 2021-01-11 | End: 2021-01-13 | Stop reason: HOSPADM

## 2021-01-11 RX ORDER — MIDAZOLAM HYDROCHLORIDE 1 MG/ML
INJECTION INTRAMUSCULAR; INTRAVENOUS PRN
Status: DISCONTINUED | OUTPATIENT
Start: 2021-01-11 | End: 2021-01-11 | Stop reason: SDUPTHER

## 2021-01-11 RX ORDER — ONDANSETRON 2 MG/ML
4 INJECTION INTRAMUSCULAR; INTRAVENOUS PRN
Status: DISCONTINUED | OUTPATIENT
Start: 2021-01-11 | End: 2021-01-11 | Stop reason: HOSPADM

## 2021-01-11 RX ORDER — ROCURONIUM BROMIDE 10 MG/ML
INJECTION, SOLUTION INTRAVENOUS PRN
Status: DISCONTINUED | OUTPATIENT
Start: 2021-01-11 | End: 2021-01-11 | Stop reason: SDUPTHER

## 2021-01-11 RX ORDER — FUROSEMIDE 20 MG/1
20 TABLET ORAL 2 TIMES DAILY
Status: DISCONTINUED | OUTPATIENT
Start: 2021-01-11 | End: 2021-01-12

## 2021-01-11 RX ORDER — OXYCODONE HYDROCHLORIDE 5 MG/1
10 TABLET ORAL EVERY 4 HOURS PRN
Status: DISCONTINUED | OUTPATIENT
Start: 2021-01-11 | End: 2021-01-13 | Stop reason: HOSPADM

## 2021-01-11 RX ORDER — GABAPENTIN 300 MG/1
600 CAPSULE ORAL ONCE
Status: COMPLETED | OUTPATIENT
Start: 2021-01-11 | End: 2021-01-11

## 2021-01-11 RX ORDER — ONDANSETRON 2 MG/ML
INJECTION INTRAMUSCULAR; INTRAVENOUS PRN
Status: DISCONTINUED | OUTPATIENT
Start: 2021-01-11 | End: 2021-01-11 | Stop reason: SDUPTHER

## 2021-01-11 RX ORDER — ACETAMINOPHEN 500 MG
1000 TABLET ORAL ONCE
Status: COMPLETED | OUTPATIENT
Start: 2021-01-11 | End: 2021-01-11

## 2021-01-11 RX ORDER — MONTELUKAST SODIUM 10 MG/1
10 TABLET ORAL DAILY
Status: DISCONTINUED | OUTPATIENT
Start: 2021-01-11 | End: 2021-01-13 | Stop reason: HOSPADM

## 2021-01-11 RX ORDER — SENNA AND DOCUSATE SODIUM 50; 8.6 MG/1; MG/1
1 TABLET, FILM COATED ORAL 2 TIMES DAILY
Status: DISCONTINUED | OUTPATIENT
Start: 2021-01-11 | End: 2021-01-13 | Stop reason: HOSPADM

## 2021-01-11 RX ORDER — SODIUM CHLORIDE 0.9 % (FLUSH) 0.9 %
10 SYRINGE (ML) INJECTION EVERY 12 HOURS SCHEDULED
Status: DISCONTINUED | OUTPATIENT
Start: 2021-01-11 | End: 2021-01-13 | Stop reason: HOSPADM

## 2021-01-11 RX ORDER — LABETALOL HYDROCHLORIDE 5 MG/ML
5 INJECTION, SOLUTION INTRAVENOUS EVERY 10 MIN PRN
Status: DISCONTINUED | OUTPATIENT
Start: 2021-01-11 | End: 2021-01-11 | Stop reason: HOSPADM

## 2021-01-11 RX ORDER — POTASSIUM CHLORIDE 750 MG/1
10 TABLET, EXTENDED RELEASE ORAL 2 TIMES DAILY
Status: DISCONTINUED | OUTPATIENT
Start: 2021-01-11 | End: 2021-01-13 | Stop reason: HOSPADM

## 2021-01-11 RX ORDER — DEXAMETHASONE SODIUM PHOSPHATE 4 MG/ML
INJECTION, SOLUTION INTRA-ARTICULAR; INTRALESIONAL; INTRAMUSCULAR; INTRAVENOUS; SOFT TISSUE PRN
Status: DISCONTINUED | OUTPATIENT
Start: 2021-01-11 | End: 2021-01-11 | Stop reason: SDUPTHER

## 2021-01-11 RX ORDER — HYDROMORPHONE HCL 110MG/55ML
PATIENT CONTROLLED ANALGESIA SYRINGE INTRAVENOUS PRN
Status: DISCONTINUED | OUTPATIENT
Start: 2021-01-11 | End: 2021-01-11 | Stop reason: SDUPTHER

## 2021-01-11 RX ORDER — CELECOXIB 100 MG/1
200 CAPSULE ORAL ONCE
Status: COMPLETED | OUTPATIENT
Start: 2021-01-11 | End: 2021-01-11

## 2021-01-11 RX ORDER — TOPIRAMATE 100 MG/1
100 TABLET, FILM COATED ORAL 2 TIMES DAILY
Status: DISCONTINUED | OUTPATIENT
Start: 2021-01-11 | End: 2021-01-13 | Stop reason: HOSPADM

## 2021-01-11 RX ORDER — PROMETHAZINE HYDROCHLORIDE 25 MG/ML
6.25 INJECTION, SOLUTION INTRAMUSCULAR; INTRAVENOUS
Status: DISCONTINUED | OUTPATIENT
Start: 2021-01-11 | End: 2021-01-11 | Stop reason: HOSPADM

## 2021-01-11 RX ORDER — LEVOTHYROXINE SODIUM 0.05 MG/1
50 TABLET ORAL DAILY
Status: DISCONTINUED | OUTPATIENT
Start: 2021-01-11 | End: 2021-01-13 | Stop reason: HOSPADM

## 2021-01-11 RX ORDER — ACETAMINOPHEN 325 MG/1
650 TABLET ORAL EVERY 6 HOURS
Status: DISCONTINUED | OUTPATIENT
Start: 2021-01-11 | End: 2021-01-13 | Stop reason: HOSPADM

## 2021-01-11 RX ORDER — ACETAMINOPHEN 500 MG
1000 TABLET ORAL ONCE
Status: DISCONTINUED | OUTPATIENT
Start: 2021-01-11 | End: 2021-01-11

## 2021-01-11 RX ORDER — FENTANYL CITRATE 50 UG/ML
INJECTION, SOLUTION INTRAMUSCULAR; INTRAVENOUS PRN
Status: DISCONTINUED | OUTPATIENT
Start: 2021-01-11 | End: 2021-01-11 | Stop reason: SDUPTHER

## 2021-01-11 RX ORDER — CELECOXIB 100 MG/1
100 CAPSULE ORAL 2 TIMES DAILY
Status: DISCONTINUED | OUTPATIENT
Start: 2021-01-11 | End: 2021-01-13 | Stop reason: HOSPADM

## 2021-01-11 RX ORDER — DIPHENHYDRAMINE HYDROCHLORIDE 50 MG/ML
12.5 INJECTION INTRAMUSCULAR; INTRAVENOUS
Status: DISCONTINUED | OUTPATIENT
Start: 2021-01-11 | End: 2021-01-11 | Stop reason: HOSPADM

## 2021-01-11 RX ORDER — FAMOTIDINE 20 MG/1
20 TABLET, FILM COATED ORAL 2 TIMES DAILY PRN
Status: DISCONTINUED | OUTPATIENT
Start: 2021-01-11 | End: 2021-01-13 | Stop reason: HOSPADM

## 2021-01-11 RX ORDER — MORPHINE SULFATE 2 MG/ML
2 INJECTION, SOLUTION INTRAMUSCULAR; INTRAVENOUS EVERY 5 MIN PRN
Status: DISCONTINUED | OUTPATIENT
Start: 2021-01-11 | End: 2021-01-11 | Stop reason: HOSPADM

## 2021-01-11 RX ORDER — SUMATRIPTAN 25 MG/1
100 TABLET, FILM COATED ORAL DAILY
Status: DISCONTINUED | OUTPATIENT
Start: 2021-01-11 | End: 2021-01-12 | Stop reason: DRUGHIGH

## 2021-01-11 RX ORDER — GABAPENTIN 300 MG/1
300 CAPSULE ORAL 3 TIMES DAILY
Status: DISCONTINUED | OUTPATIENT
Start: 2021-01-11 | End: 2021-01-13 | Stop reason: HOSPADM

## 2021-01-11 RX ORDER — SODIUM CHLORIDE 9 MG/ML
INJECTION, SOLUTION INTRAVENOUS CONTINUOUS
Status: DISCONTINUED | OUTPATIENT
Start: 2021-01-11 | End: 2021-01-13

## 2021-01-11 RX ORDER — MORPHINE SULFATE 2 MG/ML
1 INJECTION, SOLUTION INTRAMUSCULAR; INTRAVENOUS EVERY 5 MIN PRN
Status: DISCONTINUED | OUTPATIENT
Start: 2021-01-11 | End: 2021-01-11 | Stop reason: HOSPADM

## 2021-01-11 RX ORDER — PROPOFOL 10 MG/ML
INJECTION, EMULSION INTRAVENOUS PRN
Status: DISCONTINUED | OUTPATIENT
Start: 2021-01-11 | End: 2021-01-11 | Stop reason: SDUPTHER

## 2021-01-11 RX ORDER — SODIUM CHLORIDE, SODIUM LACTATE, POTASSIUM CHLORIDE, CALCIUM CHLORIDE 600; 310; 30; 20 MG/100ML; MG/100ML; MG/100ML; MG/100ML
INJECTION, SOLUTION INTRAVENOUS CONTINUOUS
Status: DISCONTINUED | OUTPATIENT
Start: 2021-01-11 | End: 2021-01-11

## 2021-01-11 RX ORDER — LIDOCAINE HYDROCHLORIDE 10 MG/ML
0.3 INJECTION, SOLUTION EPIDURAL; INFILTRATION; INTRACAUDAL; PERINEURAL
Status: COMPLETED | OUTPATIENT
Start: 2021-01-11 | End: 2021-01-11

## 2021-01-11 RX ADMIN — ACETAMINOPHEN 650 MG: 325 TABLET ORAL at 21:12

## 2021-01-11 RX ADMIN — ASPIRIN 81 MG: 81 TABLET, COATED ORAL at 14:58

## 2021-01-11 RX ADMIN — LIDOCAINE HYDROCHLORIDE 0.3 ML: 10 INJECTION, SOLUTION EPIDURAL; INFILTRATION; INTRACAUDAL; PERINEURAL at 06:24

## 2021-01-11 RX ADMIN — LIDOCAINE HYDROCHLORIDE 60 MG: 20 INJECTION, SOLUTION INFILTRATION; PERINEURAL at 07:33

## 2021-01-11 RX ADMIN — CEFAZOLIN SODIUM 2 G: 10 INJECTION, POWDER, FOR SOLUTION INTRAVENOUS at 14:57

## 2021-01-11 RX ADMIN — POTASSIUM CHLORIDE 10 MEQ: 750 TABLET, EXTENDED RELEASE ORAL at 21:12

## 2021-01-11 RX ADMIN — ASPIRIN 81 MG: 81 TABLET, COATED ORAL at 21:12

## 2021-01-11 RX ADMIN — TRANEXAMIC ACID 500 MG: 100 INJECTION, SOLUTION INTRAVENOUS at 07:57

## 2021-01-11 RX ADMIN — SUGAMMADEX 200 MG: 100 INJECTION, SOLUTION INTRAVENOUS at 09:36

## 2021-01-11 RX ADMIN — FENTANYL CITRATE 100 MCG: 50 INJECTION INTRAMUSCULAR; INTRAVENOUS at 07:33

## 2021-01-11 RX ADMIN — HYDROMORPHONE HYDROCHLORIDE 0.5 MG: 2 INJECTION INTRAMUSCULAR; INTRAVENOUS; SUBCUTANEOUS at 08:34

## 2021-01-11 RX ADMIN — HYDROMORPHONE HYDROCHLORIDE 0.5 MG: 1 INJECTION, SOLUTION INTRAMUSCULAR; INTRAVENOUS; SUBCUTANEOUS at 13:46

## 2021-01-11 RX ADMIN — ACETAMINOPHEN 650 MG: 325 TABLET ORAL at 14:57

## 2021-01-11 RX ADMIN — ACETAMINOPHEN 1000 MG: 500 TABLET ORAL at 06:24

## 2021-01-11 RX ADMIN — ATORVASTATIN CALCIUM 20 MG: 10 TABLET, FILM COATED ORAL at 22:48

## 2021-01-11 RX ADMIN — SODIUM CHLORIDE, POTASSIUM CHLORIDE, SODIUM LACTATE AND CALCIUM CHLORIDE: 600; 310; 30; 20 INJECTION, SOLUTION INTRAVENOUS at 08:42

## 2021-01-11 RX ADMIN — PROPOFOL 180 MG: 10 INJECTION, EMULSION INTRAVENOUS at 07:33

## 2021-01-11 RX ADMIN — FUROSEMIDE 20 MG: 20 TABLET ORAL at 14:58

## 2021-01-11 RX ADMIN — TRANEXAMIC ACID 1000 MG: 100 INJECTION, SOLUTION INTRAVENOUS at 07:45

## 2021-01-11 RX ADMIN — SODIUM CHLORIDE: 9 INJECTION, SOLUTION INTRAVENOUS at 14:38

## 2021-01-11 RX ADMIN — SODIUM CHLORIDE, POTASSIUM CHLORIDE, SODIUM LACTATE AND CALCIUM CHLORIDE: 600; 310; 30; 20 INJECTION, SOLUTION INTRAVENOUS at 06:24

## 2021-01-11 RX ADMIN — CELECOXIB 100 MG: 100 CAPSULE ORAL at 21:12

## 2021-01-11 RX ADMIN — CEFAZOLIN SODIUM 2 G: 10 INJECTION, POWDER, FOR SOLUTION INTRAVENOUS at 22:48

## 2021-01-11 RX ADMIN — DOCUSATE SODIUM 50MG AND SENNOSIDES 8.6MG 1 TABLET: 8.6; 5 TABLET, FILM COATED ORAL at 21:12

## 2021-01-11 RX ADMIN — BUPIVACAINE HYDROCHLORIDE 30 ML: 2.5 INJECTION, SOLUTION EPIDURAL; INFILTRATION; INTRACAUDAL; PERINEURAL at 07:04

## 2021-01-11 RX ADMIN — ONDANSETRON 4 MG: 2 INJECTION INTRAMUSCULAR; INTRAVENOUS at 09:15

## 2021-01-11 RX ADMIN — Medication 1.5 G: at 07:31

## 2021-01-11 RX ADMIN — ROCURONIUM BROMIDE 20 MG: 10 SOLUTION INTRAVENOUS at 08:32

## 2021-01-11 RX ADMIN — CELECOXIB 200 MG: 100 CAPSULE ORAL at 06:24

## 2021-01-11 RX ADMIN — ROCURONIUM BROMIDE 50 MG: 10 SOLUTION INTRAVENOUS at 07:33

## 2021-01-11 RX ADMIN — GABAPENTIN 300 MG: 300 CAPSULE ORAL at 14:57

## 2021-01-11 RX ADMIN — MIDAZOLAM HYDROCHLORIDE 2 MG: 2 INJECTION, SOLUTION INTRAMUSCULAR; INTRAVENOUS at 06:46

## 2021-01-11 RX ADMIN — CEFAZOLIN 2 G: 10 INJECTION, POWDER, FOR SOLUTION INTRAVENOUS at 07:25

## 2021-01-11 RX ADMIN — HYDROMORPHONE HYDROCHLORIDE 0.5 MG: 1 INJECTION, SOLUTION INTRAMUSCULAR; INTRAVENOUS; SUBCUTANEOUS at 18:07

## 2021-01-11 RX ADMIN — HYDROMORPHONE HYDROCHLORIDE 0.5 MG: 2 INJECTION INTRAMUSCULAR; INTRAVENOUS; SUBCUTANEOUS at 09:36

## 2021-01-11 RX ADMIN — DOCUSATE SODIUM 50MG AND SENNOSIDES 8.6MG 1 TABLET: 8.6; 5 TABLET, FILM COATED ORAL at 14:58

## 2021-01-11 RX ADMIN — POTASSIUM CHLORIDE 10 MEQ: 750 TABLET, EXTENDED RELEASE ORAL at 14:58

## 2021-01-11 RX ADMIN — LEVOTHYROXINE SODIUM 50 MCG: 0.05 TABLET ORAL at 14:58

## 2021-01-11 RX ADMIN — GABAPENTIN 600 MG: 300 CAPSULE ORAL at 06:24

## 2021-01-11 RX ADMIN — DEXAMETHASONE SODIUM PHOSPHATE 10 MG: 4 INJECTION, SOLUTION INTRAMUSCULAR; INTRAVENOUS at 08:17

## 2021-01-11 RX ADMIN — CELECOXIB 100 MG: 100 CAPSULE ORAL at 14:58

## 2021-01-11 RX ADMIN — FAMOTIDINE 20 MG: 20 TABLET ORAL at 14:58

## 2021-01-11 RX ADMIN — MONTELUKAST SODIUM 10 MG: 10 TABLET ORAL at 14:57

## 2021-01-11 ASSESSMENT — PULMONARY FUNCTION TESTS
PIF_VALUE: 17
PIF_VALUE: 16
PIF_VALUE: 3
PIF_VALUE: 16
PIF_VALUE: 16
PIF_VALUE: 17
PIF_VALUE: 3
PIF_VALUE: 16
PIF_VALUE: 13
PIF_VALUE: 17
PIF_VALUE: 2
PIF_VALUE: 17
PIF_VALUE: 15
PIF_VALUE: 16
PIF_VALUE: 3
PIF_VALUE: 15
PIF_VALUE: 17
PIF_VALUE: 16
PIF_VALUE: 2
PIF_VALUE: 24
PIF_VALUE: 17
PIF_VALUE: 17
PIF_VALUE: 16
PIF_VALUE: 1
PIF_VALUE: 16
PIF_VALUE: 3
PIF_VALUE: 3
PIF_VALUE: 16
PIF_VALUE: 16
PIF_VALUE: 15
PIF_VALUE: 17
PIF_VALUE: 16
PIF_VALUE: 16
PIF_VALUE: 15
PIF_VALUE: 0
PIF_VALUE: 15
PIF_VALUE: 16
PIF_VALUE: 10
PIF_VALUE: 16
PIF_VALUE: 7
PIF_VALUE: 17
PIF_VALUE: 2
PIF_VALUE: 16
PIF_VALUE: 3
PIF_VALUE: 17
PIF_VALUE: 17
PIF_VALUE: 15
PIF_VALUE: 17
PIF_VALUE: 6
PIF_VALUE: 16
PIF_VALUE: 16
PIF_VALUE: 17
PIF_VALUE: 16
PIF_VALUE: 16
PIF_VALUE: 15
PIF_VALUE: 16
PIF_VALUE: 15

## 2021-01-11 ASSESSMENT — PAIN DESCRIPTION - DESCRIPTORS
DESCRIPTORS: CONSTANT;ACHING
DESCRIPTORS: ACHING
DESCRIPTORS: ACHING

## 2021-01-11 ASSESSMENT — PAIN SCALES - GENERAL
PAINLEVEL_OUTOF10: 7
PAINLEVEL_OUTOF10: 4
PAINLEVEL_OUTOF10: 3
PAINLEVEL_OUTOF10: 6
PAINLEVEL_OUTOF10: 3
PAINLEVEL_OUTOF10: 0

## 2021-01-11 ASSESSMENT — PAIN DESCRIPTION - PAIN TYPE
TYPE: ACUTE PAIN;SURGICAL PAIN

## 2021-01-11 ASSESSMENT — PAIN DESCRIPTION - LOCATION
LOCATION: HIP
LOCATION: HIP

## 2021-01-11 ASSESSMENT — PAIN - FUNCTIONAL ASSESSMENT
PAIN_FUNCTIONAL_ASSESSMENT: 0-10
PAIN_FUNCTIONAL_ASSESSMENT: PREVENTS OR INTERFERES SOME ACTIVE ACTIVITIES AND ADLS

## 2021-01-11 ASSESSMENT — PAIN DESCRIPTION - ORIENTATION
ORIENTATION: RIGHT

## 2021-01-11 NOTE — PROGRESS NOTES
Occupational Therapy   Occupational Therapy Initial Assessment  Date: 2021   Patient Name: Raj Pedersen  MRN: [id number]     : 1960    Date of Service: 2021    Discharge Recommendations:  Subacute/Skilled Nursing Facility   Barriers to home discharge:   [x] Reported available assist at home upon discharge limited   [x] Patient or family requests DC to other than home            Assessment   Performance deficits / Impairments: Decreased functional mobility ; Decreased ADL status; Decreased high-level IADLs  Assessment: pt normally independent with high level IADL's & functional mobility without AD; now s/p Right THR requiring mod assist with LE dressing & CGA with bathroom mobility; pt to benefit from skilled OT services  OT Education: OT Role;Plan of Care;Precautions; ADL Adaptive Strategies;IADL Safety  Patient Education: disease specific: importance of use of nurse call light for assist with transfers, ADL's  REQUIRES OT FOLLOW UP: Yes  Activity Tolerance  Activity Tolerance: Patient Tolerated treatment well  Safety Devices  Safety Devices in place: Yes  Type of devices: Call light within reach; Chair alarm in place; Left in chair;Nurse notified           Patient Diagnosis(es): There were no encounter diagnoses. has a past medical history of Acid reflux, Allergic rhinitis, cause unspecified, Arthritis, Attention deficit disorder without mention of hyperactivity, Cough variant asthma, Cough variant asthma, Depression, Edema, HSV-1 infection, Hypercholesteremia, Hyperglycemia, Hypothyroidism, Lumbago, Migraine headache, Osteoarthritis of right hip, Posterior vitreous detachment of left eye, Routine gynecological examination, and Stress incontinence. has a past surgical history that includes Breast lumpectomy; Rotator cuff repair (Right, ); Tonsillectomy and adenoidectomy; Foot surgery (Right, 2016); Rotator cuff repair (Left, 2017); and Total hip arthroplasty (Right, 2021). Restrictions  Restrictions/Precautions  Restrictions/Precautions: Weight Bearing, General Precautions, Fall Risk, ROM Restrictions  Lower Extremity Weight Bearing Restrictions  Right Lower Extremity Weight Bearing: Weight Bearing As Tolerated  Position Activity Restriction  Hip Precautions: No hip flexion > 90 degrees, No ADduction, No hip external rotation  Other position/activity restrictions: While in bed and while turning for 24 hours then use regular pillow. 1) Up to bedside evening of surgery 2) Bathroom privileges with assistance  3)  Ambulate in room progressing to hallway with assistive device four times daily (including PT) when PT advises. 4)  Bathroom privileges with assistance. 1)  Up to bedside chair at least three times a day as tolerated. 2)  Ambulate in room progressing to hallway with assistive device four times daily (including PT) when PT advises. 3)  Bathroom privileges with assistance.     Subjective   General  Chart Reviewed: Yes  Patient assessed for rehabilitation services?: Yes  Family / Caregiver Present: No  Referring Practitioner: Dr. Jason Ibarra  Diagnosis: Right hip OA; s/p Right THR posterior-lateral approach 1-11-21  General Comment  Comments: RN cleared pt for OT eval; pt resting in bed, requesting to go to bathroom  Patient Currently in Pain: Yes  Pain Assessment  Pain Assessment: 0-10  Pain Level: 0  Patient's Stated Pain Goal: 7  Pain Type: Acute pain;Surgical pain  Pain Location: Hip  Pain Orientation: Right(7/10 with walking, 2/10 at rest)  Pain Descriptors: Aching  Functional Pain Assessment: Prevents or interferes some active activities and ADLs  Non-Pharmaceutical Pain Intervention(s): Ambulation/Increased Activity;Cold applied;Repositioned  Pre Treatment Pain Screening  Intervention List: Patient able to continue with treatment    Social/Functional History  Social/Functional History  Lives With: Alone  Type of Home: House  Home Layout: One level  Home Access: Level entry Bathroom Shower/Tub: Walk-in shower  Bathroom Toilet: Standard  Bathroom Equipment: Grab bars in shower, Shower chair, 3-in-1 commode  Home Equipment: Reacher, Sock aid, Long-handled shoehorn  ADL Assistance: Independent  Homemaking Responsibilities: Yes  Meal Prep Responsibility: Primary  Laundry Responsibility: Primary  Cleaning Responsibility: Primary  Shopping Responsibility: Primary  Ambulation Assistance: Independent(without AD)  Transfer Assistance: Independent  Active : Yes  Mode of Transportation: Car  Occupation: Retired  Type of occupation: nurse  Leisure & Hobbies: camping, calligraphy,  Additional Comments: Prefers  [address] [place] [address] for SNF upon discharge pending insurance approval        Objective   Vision: Impaired  Vision Exceptions: Wears glasses at all times  Hearing: Within functional limits    Orientation  Overall Orientation Status: Within Functional Limits  Observation/Palpation  Posture: Good  Balance  Sitting Balance: Supervision  Standing Balance: Contact guard assistance(with std. walker)  Standing Balance  Time: 1-2 minutes x 2  Activity: bathroom mobility  Functional Mobility  Functional - Mobility Device: Standard Walker  Activity: To/from bathroom  Assist Level: Contact guard assistance  Toilet Transfers  Toilet - Technique: Ambulating(CGA with std. walker)  Equipment Used: Extra wide bedside commode  Toilet Transfer: Supervision  ADL  Feeding: Independent  Grooming: Contact guard assistance(standing at sink to wash hands after toileting)  LE Dressing:  Moderate assistance(to thread underclothes on RIght foot with reacher)  Toileting: Supervision  Tone RUE  RUE Tone: Normotonic  Tone LUE  LUE Tone: Normotonic  Coordination  Movements Are Fluid And Coordinated: Yes     Bed mobility  Supine to Sit: Contact guard assistance(to RIght)  Sit to Supine: Unable to assess(Left up in chair upon exiting, pt agreeable)  Scooting: Modified independent  Transfers Sit to stand: Contact guard assistance  Stand to sit: Contact guard assistance  Vision - Basic Assessment  Prior Vision: Wears glasses all the time  Cognition  Overall Cognitive Status: WFL        Sensation  Overall Sensation Status: WFL(denies N/T)      Plan   Plan  Times per week: 4-6x/ week  Current Treatment Recommendations: Functional Mobility Training, Safety Education & Training, Positioning, Self-Care / ADL      AM-PAC Score        AM-PAC Inpatient Daily Activity Raw Score: 17 (01/11/21 1555)  AM-PAC Inpatient ADL T-Scale Score : 37.26 (01/11/21 1555)  ADL Inpatient CMS 0-100% Score: 50.11 (01/11/21 1555)  ADL Inpatient CMS G-Code Modifier : CK (01/11/21 1555)    Goals  Short term goals  Time Frame for Short term goals: 3 days(1-14-21)  Short term goal 1: supervision with bathroom mobility  Short term goal 2: supervision with use of LE AE for dressing by  1-14-21  Short term goal 3: supervision standing ADL's for 5 minutes  Patient Goals   Patient goals : be able to take care of myself & my cats       Therapy Time   Individual Concurrent Group Co-treatment   Time In 1513         Time Out 1545         Minutes [address] [place], [place]

## 2021-01-11 NOTE — ANESTHESIA PRE PROCEDURE
Department of Anesthesiology  Preprocedure Note       Name:  Jody Median   Age:  61 y.o.  :  1960                                          MRN:  [id number]         Date:  2021      Surgeon: Nicolette Saez): Celina Samson MD    Procedure: Procedure(s):  RIGHT TOTAL HIP ARTHROPLASTY POSTERIOR, POSSIBLE ABDUCTOR TENDON REPAIR             RUBI BIOMET    Medications prior to admission:   Prior to Admission medications    Medication Sig Start Date End Date Taking? Authorizing Provider   albuterol sulfate HFA (PROVENTIL HFA) 108 (90 Base) MCG/ACT inhaler Inhale 2 puffs into the lungs every 6 hours as needed for Wheezing 21  Yes José Miguel Zamudio MD   levothyroxine (SYNTHROID) 50 MCG tablet TAKE 1 TABLET BY MOUTH EVERY DAY 21  Yes Ross Mao MD   furosemide (LASIX) 20 MG tablet TAKE 1 TABLET BY MOUTH TWICE A DAY 21  Yes Ross Mao MD   montelukast (SINGULAIR) 10 MG tablet Take 1 tablet by mouth daily 21  Yes Ross Mao MD   potassium chloride (KLOR-CON 10) 10 MEQ extended release tablet TAKE 1 TABLET BY MOUTH TWICE A DAY 21  Yes Ross Mao MD   atorvastatin (LIPITOR) 20 MG tablet TAKE 1 TABLET BY MOUTH EVERY DAY 21  Yes Ross Mao MD   traMADol (ULTRAM) 50 MG tablet Take 50 mg by mouth every 8 hours as needed for Pain. Yes Historical Provider, MD   Famotidine-Ca Carb-Mag Hydrox (PEPCID COMPLETE) -165 MG CHEW Take by mouth daily as needed   Yes Historical Provider, MD   acetaminophen (TYLENOL) 650 MG extended release tablet Take 1,300 mg by mouth 2 times daily as needed    Yes Historical Provider, MD   clotrimazole-betamethasone (LOTRISONE) 1-0.05 % cream Apply topically 2 times daily.  20  Yes Ross Mao MD   NONFORMULARY Take 1 tablet by mouth nightly as needed Indications: Hylands leg cramps   Yes Historical Provider, MD   topiramate (TOPAMAX) 100 MG tablet Take 100 mg by mouth 2 times daily    Historical Provider, MD SUMAtriptan (IMITREX) 100 MG tablet TAKE ONE TABLET BY MOUTH AT ONSET OF HEADACHE; MAY REPEAT ONE TABLET IN 2 HOURS IF NEEDED. MAX 2 TABLETS IN 24 HOURS. 1/4/21   Fiordaliza Morrison MD   aspirin 81 MG tablet Take 81 mg by mouth daily    Historical Provider, MD   ibuprofen (ADVIL;MOTRIN) 600 MG tablet Take 800 mg by mouth every 8 hours as needed for Pain     Historical Provider, MD       Current medications:    Current Facility-Administered Medications   Medication Dose Route Frequency Provider Last Rate Last Admin    lactated ringers infusion   Intravenous Continuous Gabby Watters  mL/hr at 01/11/21 0624 New Bag at 01/11/21 0624    sodium chloride flush 0.9 % injection 10 mL  10 mL Intravenous 2 times per day Gabby Watters MD        sodium chloride flush 0.9 % injection 10 mL  10 mL Intravenous PRN Gabby Watters MD        tranexamic acid (CYKLOKAPRON) 1,000 mg in dextrose 5 % 100 mL IVPB  1,000 mg Intravenous On Call to [address] Puneet Goddard MD        sodium chloride flush 0.9 % injection 10 mL  10 mL Intravenous 2 times per day Joseph Gonzalez MD        sodium chloride flush 0.9 % injection 10 mL  10 mL Intravenous PRN Joseph Gonzalez MD        ceFAZolin (ANCEF) 2 g in dextrose 5 % 100 mL IVPB  2 g Intravenous On Call to [address] Puneet Goddard MD        vancomycin 1.5 g in dextrose 5% 300 mL IVPB  1,500 mg Intravenous On Call to [address] Puneet Goddard MD           Allergies:     Allergies   Allergen Reactions    Flexeril [Cyclobenzaprine] Other (See Comments)     Can't think straight and makes her extremely sedated    Levaquin [Levofloxacin In D5w]      Muscle pain    Nickel Rash       Problem List:    Patient Active Problem List   Diagnosis Code    Allergic rhinitis J30.9    Depression F32.9    Edema R60.9    HSV-1 infection B00.9    Hypercholesteremia E78.00    Hypothyroidism E03.9    Lumbago M54.5    Attention deficit disorder F98.8    Cough variant asthma J45.991    Stress incontinence N39.3    Migraine without aura and without status migrainosus, not intractable G43.009    Arthritis of carpometacarpal (CMC) joint of both thumbs M18.0    Hyperglycemia R73.9       Past Medical History:        Diagnosis Date    Acid reflux     Allergic rhinitis, cause unspecified     Arthritis     Attention deficit disorder without mention of hyperactivity 2011    Cough variant asthma     Cough variant asthma     Depression     Edema     HSV-1 infection     Hypercholesteremia     Hyperglycemia     Hypothyroidism     Lumbago     Migraine headache     Posterior vitreous detachment of left eye     Routine gynecological examination     Stress incontinence 2017       Past Surgical History:        Procedure Laterality Date    BREAST LUMPECTOMY      FOOT SURGERY Right 2016    fusion of metatarsal    ROTATOR CUFF REPAIR Right     ROTATOR CUFF REPAIR Left 2017    TONSILLECTOMY AND ADENOIDECTOMY         Social History:    Social History     Tobacco Use    Smoking status: Former Smoker     Packs/day: 1.00     Years: 16.00     Pack years: 16.00     Types: Cigarettes     Quit date: 1992     Years since quittin.0    Smokeless tobacco: Never Used   Substance Use Topics    Alcohol use:  No                                Counseling given: Not Answered      Vital Signs (Current):   Vitals:    20 1535 21 0557 21 0645 21 0649   BP:  (!) 150/61     Pulse:  74 87 75   Resp:   14 14   Temp:  98 °F (36.7 °C)     SpO2:  98% 97% 97%   Weight: 239 lb (108.4 kg) 244 lb (110.7 kg)     Height: 5' 5.5\" (1.664 m) 5' 6\" (1.676 m)                                                BP Readings from Last 3 Encounters:   21 (!) 150/61   20 132/80   20 120/83       NPO Status: Time of last liquid consumption: 2200                        Time of last solid consumption: 1800                        Date of last liquid consumption: 01/10/21                        Date of last solid food consumption: 01/10/21    BMI:   Wt Readings from Last 3 Encounters:   01/11/21 244 lb (110.7 kg)   01/05/21 239 lb (108.4 kg)   12/29/20 239 lb 12.8 oz (108.8 kg)     Body mass index is 39.38 kg/m². CBC:   Lab Results   Component Value Date    WBC 5.8 12/28/2020    RBC 5.16 12/28/2020    HGB 15.7 12/28/2020    HCT 47.5 12/28/2020    MCV 92.0 12/28/2020    RDW 14.7 12/28/2020     12/28/2020       CMP:   Lab Results   Component Value Date     12/28/2020    K 4.1 12/28/2020     12/28/2020    CO2 28 12/28/2020    BUN 18 12/28/2020    CREATININE 0.7 12/28/2020    GFRAA >60 12/28/2020    GFRAA 75 01/03/2013    AGRATIO 1.7 11/13/2020    LABGLOM >60 12/28/2020    LABGLOM >60>60 08/28/2011    GLUCOSE 89 12/28/2020    PROT 7.1 11/13/2020    PROT 7.7 01/03/2013    CALCIUM 10.0 12/28/2020    BILITOT 0.4 11/13/2020    ALKPHOS 121 11/13/2020    AST 18 11/13/2020    ALT 17 11/13/2020       POC Tests: No results for input(s): POCGLU, POCNA, POCK, POCCL, POCBUN, POCHEMO, POCHCT in the last 72 hours.     Coags:   Lab Results   Component Value Date    PROTIME 10.7 12/28/2020    INR 0.92 12/28/2020       HCG (If Applicable): No results found for: PREGTESTUR, PREGSERUM, HCG, HCGQUANT     ABGs: No results found for: PHART, PO2ART, BAY1KET, IRE2KOP, BEART, N8EXQSBG     Type & Screen (If Applicable):  No results found for: LABABO, LABRH    Drug/Infectious Status (If Applicable):  No results found for: HIV, HEPCAB    COVID-19 Screening (If Applicable):   Lab Results   Component Value Date    COVID19 NOT DETECTED 01/05/2021         Anesthesia Evaluation  Patient summary reviewed and Nursing notes reviewed  Airway: Mallampati: III  TM distance: >3 FB   Neck ROM: full  Mouth opening: > = 3 FB Dental: normal exam         Pulmonary:normal exam  breath sounds clear to auscultation  (+) asthma:                            Cardiovascular:Negative CV ROS            Rhythm: regular  Rate: normal

## 2021-01-11 NOTE — ANESTHESIA POSTPROCEDURE EVALUATION
Department of Anesthesiology  Postprocedure Note    Patient: Yusuf Simpson  MRN: [id number]  YOB: 1960  Date of evaluation: 1/11/2021  Time:  11:38 AM     Procedure Summary     Date: 01/11/21 Room / Location: [address] [place] [address] [address] / [place]    Anesthesia Start: 0725 Anesthesia Stop: 7586    Procedure: RIGHT TOTAL HIP ARTHROPLASTY POSTERIOR,         RUBI BIOMET (Right ) Diagnosis:       Primary osteoarthritis of right hip      Unspecified injury of muscle, fascia and tendon of right hip, initial encounter      (RIGHT HIP PRIMARY OSTEOARTHRITIS; UNSPECIFIED INJURY OF MUSCLE, FASCIA AND TENDON OF RIGHT HIP)    Surgeons: Donald Rojas MD Responsible Provider: Elis Li MD    Anesthesia Type: general ASA Status: 3          Anesthesia Type: general    Chica Phase I: Chica Score: 5    Chica Phase II:      Last vitals: Reviewed and per EMR flowsheets.        Anesthesia Post Evaluation    Patient location during evaluation: PACU  Patient participation: complete - patient participated  Level of consciousness: awake and alert  Pain score: 0  Airway patency: patent  Nausea & Vomiting: no nausea and no vomiting  Complications: no  Cardiovascular status: blood pressure returned to baseline  Respiratory status: acceptable  Hydration status: stable

## 2021-01-11 NOTE — PROGRESS NOTES
4 Eyes Skin Assessment     The patient is being assess for  Admission    I agree that 2 RN's have performed a thorough Head to Toe Skin Assessment on the patient. ALL assessment sites listed below have been assessed. Areas assessed by both nurses: Madeleine Zamora, TOM's  [x]   Head, Face, and Ears   [x]   Shoulders, Back, and Chest  [x]   Arms, Elbows, and Hands   [x]   Coccyx, Sacrum, and Ischum  [x]   Legs, Feet, and Heels        Does the Patient have Skin Breakdown? Redness in abodominal folds with scattered bruising noted.          Willian Prevention initiated:  Yes   Wound Care Orders initiated:  No      [facility] nurse consulted for Pressure Injury (Stage 3,4, Unstageable, DTI, NWPT, and Complex wounds):  No      Nurse 1 eSignature: Electronically signed by Alan Rey RN on 1/11/21 at 3:27 PM EST    **SHARE this note so that the co-signing nurse is able to place an eSignature**    Nurse 2 eSignature: Electronically signed by Yuliya Robbins RN on 1/11/21 at 7:08 PM EST

## 2021-01-11 NOTE — ANESTHESIA PROCEDURE NOTES
Peripheral Block    Patient location during procedure: holding area  Start time: 1/11/2021 6:51 AM  End time: 1/11/2021 6:56 AM  Staffing  Performed: anesthesiologist   Anesthesiologist: Marleny Ribeiro MD  Preanesthetic Checklist  Completed: patient identified, IV checked, site marked, risks and benefits discussed, surgical consent, monitors and equipment checked, pre-op evaluation, timeout performed, anesthesia consent given, oxygen available and patient being monitored  Peripheral Block  Patient position: supine  Prep: ChloraPrep  Patient monitoring: IV access and continuous pulse ox  Block type: Fascia iliaca  Laterality: right  Injection technique: single-shot  Guidance: ultrasound guided  Local infiltration: lidocaine  Infiltration strength: 2 %  Dose: 1 mL  Provider prep: sterile gloves and mask  Local infiltration: lidocaine  Needle  Needle type: combined needle/nerve stimulator   Needle gauge: 22 G  Needle length: 5 cm  Needle localization: ultrasound guidance  Assessment  Injection assessment: negative aspiration for heme  Paresthesia pain: none  Slow fractionated injection: yes  Hemodynamics: stable  Additional Notes  Timeout with RN; aspiration every 5mL  Medications Administered  Bupivacaine (MARCAINE) PF injection 0.25%, 30 mL  Reason for block: post-op pain management

## 2021-01-11 NOTE — PLAN OF CARE
Problem: Pain:  Goal: Pain level will decrease  Description: Pain level will decrease  Outcome: Ongoing  Note: Patient will remain free of pain prior to discharge. Patient will utilize 0-10 scale to describe pain as well as onset, location, and duration. Patient will utilize nonpharmacologic and pharmacolgoic pain relief methods as educated by RN. PRN pain medications will be administered per orders as needed. Will continue to monitor patient. Problem: Falls - Risk of:  Goal: Will remain free from falls  Description: Will remain free from falls  Outcome: Ongoing  Note: Patient will remain free of falls. Patient calls out appropriately. Patient utilizes call light, bed is in lowest locked position, nonskid footwear in place. Patient's room is kept free of clutter and patient is assisted with ambulation as needed. Will continue to monitor and educate patient on safety precautions.

## 2021-01-11 NOTE — OP NOTE
Orthopaedic Surgery  Operative Report      Patient Name:  Deborah Mayorga  Patient :  1960  MRN: [id number]    Date: 21     Pre-operative Diagnosis:   M16.11 Right hip primary osteoarthritis    Post-operative Diagnosis:    Same    Procedure: RIGHT  05957 Total Hip Arthroplasty, posterior approach    Surgeon:  Surgeon(s) and Role:     * Umu Landers MD - Primary    Assistant: Circulator: Ivette Lopez RN  Surgical Assistant: Brionna García  Scrub Person First:  Shawn    Anesthesia: Intraoperative local infiltration - Exparel and marcaine, and General endotracheal anesthesia    Estimated blood loss: 300    Specimens: * No specimens in log *    Complications: None    Drains: None    Condition: Stable    Implants:   Implant Name Type Inv.  Item Serial No.  Lot No. LRB No. Used Action   SHELL ACET SZ E [id number] 3 H OSSEOTI LIMIT H 2 MOBILITY G7  SHELL ACET SZ E [id number] 3 H OSSEOTI LIMIT H 2 MOBILITY G7  [employer]- [id number] Right 1 Implanted   G7 DUAL MOBILITY LINER 42MM E Hip G7 DUAL MOBILITY LINER 42MM E  [employer]- [id number] Right 1 Implanted   STEM FEM STD OFFSET 5 HIP HA AVENIR COMPLETE  STEM FEM STD OFFSET 5 HIP HA AVENIR COMPLETE  [employer]- [id number] Right 1 Implanted   BEARING ACET OD42MM ID28MM HIP E1 2 MOBILITY ACT ARTC  BEARING ACET OD42MM ID28MM HIP E1 2 MOBILITY ACT ARTC  [employer]- [id number] Right 1 Implanted   HEAD FEM 3- MM 12/14 28 MM HIP TAPR BIOLOX DELT OPT  HEAD FEM 3- MM 12/14 28 MM HIP TAPR BIOLOX DELT OPT  [employer]- [id number] Right 1 Implanted   UPCHARGE HIP VITAMIN E LINER RUBI BIOMET Hip UPCHARGE HIP VITAMIN E LINER RUBI BIOMET  [employer]  Right 1 Implanted       Findings:   -end-stage OA  -no gross abductor tear found  -preop clinically long leg-length, ~3-5 mm Indications: Laureen Reese has end-stage osteoarthritis of the right hip. She has failed conservative treatments. There was a suspicion of an abductor tear based on her limp and physical examination. Posterior approach was chosen based on body habitus and presence of a possible abductor tear. She understood the risk benefits and alternatives and wanted to proceed with the above operation. Procedure Details:   I marked the right hip as the operative site. Patient was taken back to the operating room theater laid supine on the table. General anesthesia was induced. She was transferred right up, left down lateral decubitus position. Right lower extremity was prepped and draped in standard sterile fashion. Timeout was performed. Preoperative Ancef and weight-based vancomycin was given. 1 g tranexamic acid was given prior to incision. We began with a posterior lateral approach to the hip. The fascial incision was made and bluntly dissected along the fibers of the gluteus ronnie. Leading edge of the gluteus medius was identified and atraumatically lifted up. The underlying piriformis tendon and capsule structures were then incised using standard technique. Hip was then dislocated. Femoral neck osteotomy was then made. Direction was then turned toward the acetabulum. Blunt retractor was then placed over the anterior wall. Self-retaining retractor was placed in the capsular leaflets superiorly and posteriorly. Labral structures were then removed. Pulmonary tissue was evacuated. Sequential reaming was performed and the appropriate sized implant was impacted as above. DM liner implant was also impacted. Stability the implant here was confirmed. Attention was then turned toward the femoral neck exposure. The leg was internally rotated and slightly flexed. Femoral neck elevator delivered the proximal femur. Box chisel, canal finder, lateralizing reamer were then used. Sequential broaching was performed until appropriately sized broach was taken. Calcar reaming was then performed down toward almost close to the lesser trochanter as templated in the preoperative images. Trial reduction was then performed. We were happy with the reduction and the associated implants. The hip was then redislocated. The trial components were then removed, and the real prosthetic stem was then impacted. Head ball as above was impacted as well the hip was reduced atraumatically. Dual mobility construct was used. She passed all stability check examinations. She had good stability in the position of sleep. She was able to flex to 90 degrees and internally rotate to 45 prior to any evidence of subluxation. She had no evidence of instability with deep flexion whatsoever. She had reasonable excursion over the anterior musculature with no overtightening anteriorly. Offset was also restored. Leg length was restored clinically based on manual examination. 2 g vancomycin powder was then implanted. Marcaine and Exparel solution was then injected in the perioperative field. Sequential closure was then performed. Transosseous repair through the trochanter bone was then performed of the short external rotators and capsule structures. A cytocide capsular stitch was also applied. This was all performed by #5 Ethibond. #2 Ethibond approximated the fascial layer, followed by a running #2 Quill double-sided suture. A running 0 Vicryl suture approximated the deep subcutaneous layer. 2-0 Vicryl sutures approximated subcutaneous tissue followed by 4-0 Monocryl running for skin. Dermabond pernio was applied. I was present throughout the entirety the case with the exception of skin closure. She was awakened from general anesthesia and transported back to the postoperative care unit without any complications. All instrument counts correct x2. Postoperative plan as per brief op note.       Electronically signed by Paool Leach MD on 1/11/2021 at 3:50 PM

## 2021-01-11 NOTE — PROGRESS NOTES
Physical Therapy    Facility/Department: [facility] - MED SURG/ORTHO  Initial Assessment    NAME: Lina Ramon  : 1960  MRN: [id number]    Date of Service: 2021    Discharge Recommendations:  Subacute/Skilled Nursing Facility   PT Equipment Recommendations  Equipment Needed: No  Other: defer to facility    Assessment    Body structures, Functions, Activity limitations: Decreased functional mobility ; Decreased high-level IADLs;Decreased ADL status; Decreased endurance;Decreased ROM; Decreased strength;Decreased balance; Increased pain  Assessment: Patient seen for PT evaluation and gait training. Patient cleared by RN for therapy participation this date. Patient agreeable to therapy. Patient seen s/p R NATASHA  with RLE WBAT with posterior hip precautions per op note . Patient completed transfers and ambulates 15 ft with SW and CGA with cues for sequencing. P.T .will continue to follow throughout LOS. Recommending DC to SNF d/t pt lives alone and requesting rehab prior to d/c to home. Treatment Diagnosis: decreased mobility s/p R NATASHA  Specific instructions for Next Treatment: progress gait, HEP  Prognosis: Good  Decision Making: Low Complexity  PT Education: Goals;Transfer Training;Disease Specific Education;PT Role;Functional Mobility Training;Plan of Care;General Safety;Weight-bearing Education;Home Exercise Program;Orientation;Precautions;Gait Training  Patient Education: pt educated on use of SW, hand placement with transfers, and hip precautions  Barriers to Learning: none  REQUIRES PT FOLLOW UP: Yes  Activity Tolerance  Activity Tolerance: Patient limited by pain; Patient limited by endurance; Patient limited by fatigue  Activity Tolerance: denies dizziness, BP stable       Patient Diagnosis(es): There were no encounter diagnoses. has a past medical history of Acid reflux, Allergic rhinitis, cause unspecified, Arthritis, Attention deficit disorder without mention of hyperactivity, Cough variant asthma, Cough variant asthma, Depression, Edema, HSV-1 infection, Hypercholesteremia, Hyperglycemia, Hypothyroidism, Lumbago, Migraine headache, Osteoarthritis of right hip, Posterior vitreous detachment of left eye, Routine gynecological examination, and Stress incontinence. has a past surgical history that includes Breast lumpectomy; Rotator cuff repair (Right, 2009); Tonsillectomy and adenoidectomy; Foot surgery (Right, 05/2016); Rotator cuff repair (Left, 12/2017); and Total hip arthroplasty (Right, 1/11/2021). Restrictions  Restrictions/Precautions  Restrictions/Precautions: Weight Bearing, General Precautions, Fall Risk, ROM Restrictions  Lower Extremity Weight Bearing Restrictions  Right Lower Extremity Weight Bearing: Weight Bearing As Tolerated  Position Activity Restriction  Hip Precautions: No hip flexion > 90 degrees, No ADduction, No hip external rotation  Other position/activity restrictions: While in bed and while turning for 24 hours then use regular pillow. 1) Up to bedside evening of surgery 2) Bathroom privileges with assistance  3)  Ambulate in room progressing to hallway with assistive device four times daily (including PT) when PT advises. 4)  Bathroom privileges with assistance. 1)  Up to bedside chair at least three times a day as tolerated. 2)  Ambulate in room progressing to hallway with assistive device four times daily (including PT) when PT advises. 3)  Bathroom privileges with assistance.   Vision/Hearing  Vision: Impaired  Vision Exceptions: Wears glasses at all times  Hearing: Within functional limits     Subjective  General  Chart Reviewed: Yes  Patient assessed for rehabilitation services?: Yes  Response To Previous Treatment: Not applicable  Family / Caregiver Present: No  Referring Practitioner: Fly Rajput Referral Date : 01/11/20  Diagnosis: s/p R NATASHA 1/11  Follows Commands: Within Functional Limits  Subjective  Subjective: pt in bed, requesting to use bathroom  Pain Screening  Patient Currently in Pain: Yes  Pain Assessment  Pain Level: 0  Patient's Stated Pain Goal: 7  Pain Type: Acute pain;Surgical pain  Pain Location: Hip  Pain Orientation: Right(7/10 with walking, 2/10 at rest)  Pain Descriptors: Aching  Non-Pharmaceutical Pain Intervention(s): Ambulation/Increased Activity;Cold applied;Repositioned  Vital Signs  Patient Currently in Pain: Yes       Orientation  Orientation  Overall Orientation Status: Within Normal Limits  Social/Functional History  Social/Functional History  Lives With: Alone  Type of Home: House  Home Layout: One level  Home Access: Level entry  Bathroom Shower/Tub: Walk-in shower  Bathroom Toilet: Standard  Bathroom Equipment: Grab bars in shower, Shower chair, 3-in-1 commode  Home Equipment: Reacher, Sock aid, Long-handled shoehorn  ADL Assistance: Independent  Homemaking Responsibilities: Yes  Meal Prep Responsibility: Primary  Laundry Responsibility: Primary  Cleaning Responsibility: Primary  Shopping Responsibility: Primary  Ambulation Assistance: Independent(without AD)  Transfer Assistance: Independent  Active : Yes  Mode of Transportation: Car  Occupation: Retired  Type of occupation: nurse  Leisure & Hobbies: francine, [facility],  Additional Comments: plans on [facility] for SNF upon discharge PRN  Objective     Observation/Palpation  Posture: Good    AROM RLE (degrees)  RLE AROM: WFL  RLE General AROM: did not formally assess hip d/t hip precautions from recent surgery  AROM LLE (degrees)  LLE AROM : WFL  Strength RLE  Strength RLE: Exception  Comment: did not formally assess d/t recent surgery, appears at least 3/5 with mobility assessment  Strength LLE  Strength LLE: WFL     Sensation  Overall Sensation Status: WFL(denies N/T)  Bed mobility Supine to Sit: Contact guard assistance(toward R)  Sit to Supine: Unable to assess(up in chair at end of session)  Scooting: Modified independent(to EOB)  Transfers  Sit to Stand: Contact guard assistance(with SW)  Stand to sit: Contact guard assistance(with SW)  Ambulation  Ambulation?: Yes  WB Status: WBAT RLE  Ambulation 1  Surface: level tile  Device: Standard Walker  Assistance: Contact guard assistance  Quality of Gait: Pt ambulates with decreased sofi and step length height with pt initially sliding RLE and progressing to taking steps with cues for sequencing steps with SW and offloading with BUE for pain  Gait Deviations: Slow Sofi;Decreased step length;Decreased step height  Distance: 2x 15 ft  Stairs/Curb  Stairs?: No              Plan   Plan  Times per week: BID x7  Times per day: Twice a day  Plan weeks: 1/15/20  Specific instructions for Next Treatment: progress gait, HEP  Current Treatment Recommendations: Strengthening, Transfer Training, Endurance Training, Patient/Caregiver Education & Training, Balance Training, Gait Training, Home Exercise Program, Functional Mobility Training, Safety Education & Training  Safety Devices  Type of devices: All fall risk precautions in place, Patient at risk for falls, Call light within reach, Left in chair, Chair alarm in place, Gait belt, Nurse notified  Restraints  Initially in place: No    AM-PAC Score     AM-PAC Inpatient Mobility without Stair Climbing Raw Score : 16 (01/11/21 1603)  AM-PAC Inpatient without Stair Climbing T-Scale Score : 45.54 (01/11/21 1603)  Mobility Inpatient CMS 0-100% Score: 40.64 (01/11/21 1603)  Mobility Inpatient without Stair CMS G-Code Modifier : CK (01/11/21 1603)       Goals  Short term goals  Time Frame for Short term goals: 1/15/20  Short term goal 1: Pt will complete bed mobility independently.   Short term goal 2: Pt will complete transfers mod I with SW.  Short term goal 3: Pt will ambulate 50 ft with SW mod I. Short term goal 4: Pt will tolerate 12-15 reps of LE exercise for strengthening and endurance by 1/13/20. Short term goal 5: Pt will ambulate to bathroom with SW and min A; goal met 1/11 - pt ambulates to bathroom with SW and CGA. ,  Patient Goals   Patient goals : Rip Hughs to the bathroom\"       Therapy Time   Individual Concurrent Group Co-treatment   Time In 1313         Time Out 1350         Minutes 37         Timed Code Treatment Minutes: 27 Minutes(10 mins eval)     If pt is unable to be seen after this session, please let this note serve as discharge summary. Please see case management note for discharge disposition. Thank you.     Christiano Adjutant, PT

## 2021-01-11 NOTE — PROGRESS NOTES
Pt brought to PACU. Report obtained from OR RN and anesthesia. Pt placed on monitor. Pt not yet awake. Oral airway in place. Right hip dressing CDI with ice in place.  Doc Section

## 2021-01-11 NOTE — H&P
History & Physical    No chief complaint on file. Right hip pain    History of Present Illness      Moise Long is a 61 y.o. female who presents with right hip pain. Plan for Right NATASHA Today, has been limping with likely abductor tear as well. Past History       Past Medical History:   Diagnosis Date    Acid reflux     Allergic rhinitis, cause unspecified     Arthritis     Attention deficit disorder without mention of hyperactivity 2011    Cough variant asthma     Cough variant asthma     Depression     Edema     HSV-1 infection     Hypercholesteremia     Hyperglycemia     Hypothyroidism     Lumbago     Migraine headache     Posterior vitreous detachment of left eye     Routine gynecological examination     Stress incontinence 2017     Past Surgical History:   Procedure Laterality Date    BREAST LUMPECTOMY      FOOT SURGERY Right 2016    fusion of metatarsal    ROTATOR CUFF REPAIR Right     ROTATOR CUFF REPAIR Left 2017    TONSILLECTOMY AND ADENOIDECTOMY       Social History     Tobacco Use    Smoking status: Former Smoker     Packs/day: 1.00     Years: 16.00     Pack years: 16.00     Types: Cigarettes     Quit date: 1992     Years since quittin.0    Smokeless tobacco: Never Used   Substance Use Topics    Alcohol use: No      No current facility-administered medications on file prior to encounter. Current Outpatient Medications on File Prior to Encounter   Medication Sig Dispense Refill    traMADol (ULTRAM) 50 MG tablet Take 50 mg by mouth every 8 hours as needed for Pain.  Famotidine-Ca Carb-Mag Hydrox (PEPCID COMPLETE) -165 MG CHEW Take by mouth daily as needed      acetaminophen (TYLENOL) 650 MG extended release tablet Take 1,300 mg by mouth 2 times daily as needed       clotrimazole-betamethasone (LOTRISONE) 1-0.05 % cream Apply topically 2 times daily.  45 g 0    NONFORMULARY Take 1 tablet by mouth nightly as needed Indications: Hylands leg cramps      aspirin 81 MG tablet Take 81 mg by mouth daily      ibuprofen (ADVIL;MOTRIN) 600 MG tablet Take 800 mg by mouth every 8 hours as needed for Pain         Flexeril [cyclobenzaprine], Levaquin [levofloxacin in d5w], and Nickel    Review of Systems      10-point ROS is negative other than what's mentioned in HPI. Physical Exam    Based off 1997 Exam Criteria    BP (!) 150/61   Pulse 75   Temp 98 °F (36.7 °C)   Resp 14   Ht 5' 6\" (1.676 m)   Wt 244 lb (110.7 kg)   LMP 08/26/2019   SpO2 97%   BMI 39.38 kg/m²      Constitutional:       General: He is not in acute distress. Appearance: Normal appearance. Cardiovascular:      Rate and Rhythm: Normal rate and regular rhythm. Pulses: Normal pulses. Pulmonary:      Effort: Pulmonary effort is normal. No respiratory distress. Neurological:      Mental Status: He is alert and oriented to person, place, and time. Mental status is at baseline. Musculoskeletal:  Right hip tenderness, slightly longer leg lengthon right . Imaging       Right hip  Radiographs: previous xray and MRI reviewed      Assessment and Plan      Plan for Right NATASHA today, possible abductor repair  Inpatient rehab   Has been fitted for abductor brace      Electronically signed by Violette Mosqueda MD on 1/11/2021 at 7:23 AM  This dictation was generated by voice recognition computer software. Although all attempts are made to edit the dictation for accuracy, there may be errors in the transcription that are not intended.

## 2021-01-11 NOTE — PROGRESS NOTES
Patient arrived to unit via bed from PACU in stable condition with all belongings. VSS. Admission assessment completed as charted. Patient oriented to room, call light, bed, phone, lights, bathroom, and nurse. Patient educated on the daily schedule including vitals, lab draws, assessments, possible tests, schedule of MD rounding, daily weights and I's & O's. Patient instructed on current diet, how to use 8MENU, and TV. Patient verbalizes pain as 3  on 0-10 scale. Patient denies any other needs at this time. Bed is in lowest locked position, call light is within reach, and side rails up 2/4. Will continue to monitor patient for pain, discomfort or any other needs.

## 2021-01-11 NOTE — BRIEF OP NOTE
Brief Postoperative Note      Patient: Flo Ramos  YOB: 1960  MRN: [id number]    Date of Procedure: 1/11/2021    Pre-Op Diagnosis: RIGHT HIP PRIMARY OSTEOARTHRITIS; UNSPECIFIED INJURY OF MUSCLE, FASCIA AND TENDON OF RIGHT HIP    Post-Op Diagnosis: Right hip primary OA       Procedure(s):  RIGHT TOTAL HIP ARTHROPLASTY POSTERIOR,         RUBI BIOMET    Surgeon(s): Shelia Wilson MD    Assistant:  Surgical Assistant: Pro Ross    Anesthesia: General    Estimated Blood Loss (mL): 263     Complications: None    Specimens:   * No specimens in log *    Implants:  Implant Name Type Inv.  Item Serial No.  Lot No. LRB No. Used Action   SHELL ACET SZ E [id number] 3 H OSSEOTI LIMIT H 2 MOBILITY G7  SHELL ACET SZ E [id number] 3 H OSSEOTI LIMIT H 2 MOBILITY G7  [facility] [id number] Right 1 Implanted   G7 DUAL MOBILITY LINER 42MM E Hip G7 DUAL MOBILITY LINER 42MM E  [facility] [id number] Right 1 Implanted   STEM FEM STD OFFSET 5 HIP HA AVENIR COMPLETE  STEM FEM STD OFFSET 5 HIP HA AVENIR COMPLETE  [employer]- [id number] Right 1 Implanted   BEARING ACET OD42MM ID28MM HIP E1 2 MOBILITY ACT ARTC  BEARING ACET OD42MM ID28MM HIP E1 2 MOBILITY ACT ARTC  [employer]- [id number] Right 1 Implanted   HEAD FEM 3- MM 12/14 28 MM HIP TAPR BIOLOX DELT OPT  HEAD FEM 3- MM 12/14 28 MM HIP TAPR BIOLOX DELT OPT  [employer]- [id number] Right 1 Implanted         Drains: * No LDAs found *    Findings:  -end-stage OA  -no gross abductor tear found    PLAN:  - WBAT RLE with posterior hip precautions  - aspirin 81 mg BID  - no hip brace needed  - abduction pillow only while in bed, can move around with therapy without it  - SCDs while inpatient  - PT  - dispo: likely inpatient rehab planning          Electronically signed by Shelia Wilson MD on 1/11/2021 at 9:36 AM

## 2021-01-12 PROBLEM — Z96.641 STATUS POST TOTAL REPLACEMENT OF RIGHT HIP: Status: ACTIVE | Noted: 2021-01-12

## 2021-01-12 LAB
ANION GAP SERPL CALCULATED.3IONS-SCNC: 8 MMOL/L (ref 3–16)
BUN BLDV-MCNC: 13 MG/DL (ref 7–20)
CALCIUM SERPL-MCNC: 9.3 MG/DL (ref 8.3–10.6)
CHLORIDE BLD-SCNC: 107 MMOL/L (ref 99–110)
CO2: 26 MMOL/L (ref 21–32)
CREAT SERPL-MCNC: 0.7 MG/DL (ref 0.6–1.2)
GFR AFRICAN AMERICAN: >60
GFR NON-AFRICAN AMERICAN: >60
GLUCOSE BLD-MCNC: 121 MG/DL (ref 70–99)
HCT VFR BLD CALC: 34.6 % (ref 36–48)
HEMOGLOBIN: 11.3 G/DL (ref 12–16)
MCH RBC QN AUTO: 30.3 PG (ref 26–34)
MCHC RBC AUTO-ENTMCNC: 32.7 G/DL (ref 31–36)
MCV RBC AUTO: 92.7 FL (ref 80–100)
PDW BLD-RTO: 14.5 % (ref 12.4–15.4)
PLATELET # BLD: 194 K/UL (ref 135–450)
PMV BLD AUTO: 8.8 FL (ref 5–10.5)
POTASSIUM SERPL-SCNC: 4.3 MMOL/L (ref 3.5–5.1)
RBC # BLD: 3.74 M/UL (ref 4–5.2)
SODIUM BLD-SCNC: 141 MMOL/L (ref 136–145)
WBC # BLD: 14.9 K/UL (ref 4–11)

## 2021-01-12 PROCEDURE — 2580000003 HC RX 258: Performed by: ORTHOPAEDIC SURGERY

## 2021-01-12 PROCEDURE — 97535 SELF CARE MNGMENT TRAINING: CPT

## 2021-01-12 PROCEDURE — 97530 THERAPEUTIC ACTIVITIES: CPT

## 2021-01-12 PROCEDURE — 36415 COLL VENOUS BLD VENIPUNCTURE: CPT

## 2021-01-12 PROCEDURE — 97116 GAIT TRAINING THERAPY: CPT

## 2021-01-12 PROCEDURE — 1200000000 HC SEMI PRIVATE

## 2021-01-12 PROCEDURE — 6360000002 HC RX W HCPCS: Performed by: ORTHOPAEDIC SURGERY

## 2021-01-12 PROCEDURE — 85027 COMPLETE CBC AUTOMATED: CPT

## 2021-01-12 PROCEDURE — 6370000000 HC RX 637 (ALT 250 FOR IP): Performed by: NURSE PRACTITIONER

## 2021-01-12 PROCEDURE — APPNB30 APP NON BILLABLE TIME 0-30 MINS: Performed by: PHYSICIAN ASSISTANT

## 2021-01-12 PROCEDURE — 97110 THERAPEUTIC EXERCISES: CPT

## 2021-01-12 PROCEDURE — 80048 BASIC METABOLIC PNL TOTAL CA: CPT

## 2021-01-12 PROCEDURE — 6370000000 HC RX 637 (ALT 250 FOR IP): Performed by: ORTHOPAEDIC SURGERY

## 2021-01-12 PROCEDURE — 6370000000 HC RX 637 (ALT 250 FOR IP): Performed by: ANESTHESIOLOGY

## 2021-01-12 RX ORDER — KETOROLAC TROMETHAMINE 30 MG/ML
30 INJECTION, SOLUTION INTRAMUSCULAR; INTRAVENOUS EVERY 6 HOURS PRN
Status: DISCONTINUED | OUTPATIENT
Start: 2021-01-12 | End: 2021-01-13 | Stop reason: HOSPADM

## 2021-01-12 RX ORDER — FUROSEMIDE 40 MG/1
40 TABLET ORAL DAILY
Status: DISCONTINUED | OUTPATIENT
Start: 2021-01-13 | End: 2021-01-13 | Stop reason: HOSPADM

## 2021-01-12 RX ORDER — SUMATRIPTAN 25 MG/1
100 TABLET, FILM COATED ORAL PRN
Status: DISCONTINUED | OUTPATIENT
Start: 2021-01-12 | End: 2021-01-13 | Stop reason: HOSPADM

## 2021-01-12 RX ADMIN — OXYCODONE 10 MG: 5 TABLET ORAL at 18:43

## 2021-01-12 RX ADMIN — LEVOTHYROXINE SODIUM 50 MCG: 0.05 TABLET ORAL at 09:19

## 2021-01-12 RX ADMIN — ATORVASTATIN CALCIUM 20 MG: 10 TABLET, FILM COATED ORAL at 20:27

## 2021-01-12 RX ADMIN — MONTELUKAST SODIUM 10 MG: 10 TABLET ORAL at 09:19

## 2021-01-12 RX ADMIN — OXYCODONE 10 MG: 5 TABLET ORAL at 09:19

## 2021-01-12 RX ADMIN — HYDROMORPHONE HYDROCHLORIDE 0.25 MG: 1 INJECTION, SOLUTION INTRAMUSCULAR; INTRAVENOUS; SUBCUTANEOUS at 05:13

## 2021-01-12 RX ADMIN — KETOROLAC TROMETHAMINE 30 MG: 30 INJECTION, SOLUTION INTRAMUSCULAR at 20:26

## 2021-01-12 RX ADMIN — Medication 10 ML: at 20:30

## 2021-01-12 RX ADMIN — ASPIRIN 81 MG: 81 TABLET, COATED ORAL at 20:27

## 2021-01-12 RX ADMIN — DOCUSATE SODIUM 50MG AND SENNOSIDES 8.6MG 1 TABLET: 8.6; 5 TABLET, FILM COATED ORAL at 09:19

## 2021-01-12 RX ADMIN — OXYCODONE 10 MG: 5 TABLET ORAL at 00:02

## 2021-01-12 RX ADMIN — ACETAMINOPHEN 650 MG: 325 TABLET ORAL at 23:26

## 2021-01-12 RX ADMIN — OXYCODONE 10 MG: 5 TABLET ORAL at 04:15

## 2021-01-12 RX ADMIN — OXYCODONE 10 MG: 5 TABLET ORAL at 23:26

## 2021-01-12 RX ADMIN — FUROSEMIDE 20 MG: 20 TABLET ORAL at 09:19

## 2021-01-12 RX ADMIN — GABAPENTIN 300 MG: 300 CAPSULE ORAL at 20:27

## 2021-01-12 RX ADMIN — DOCUSATE SODIUM 50MG AND SENNOSIDES 8.6MG 1 TABLET: 8.6; 5 TABLET, FILM COATED ORAL at 20:27

## 2021-01-12 RX ADMIN — ACETAMINOPHEN 650 MG: 325 TABLET ORAL at 04:13

## 2021-01-12 RX ADMIN — CELECOXIB 100 MG: 100 CAPSULE ORAL at 09:19

## 2021-01-12 RX ADMIN — POTASSIUM CHLORIDE 10 MEQ: 750 TABLET, EXTENDED RELEASE ORAL at 09:19

## 2021-01-12 RX ADMIN — OXYCODONE 10 MG: 5 TABLET ORAL at 13:52

## 2021-01-12 RX ADMIN — SUMATRIPTAN SUCCINATE 100 MG: 25 TABLET ORAL at 20:44

## 2021-01-12 RX ADMIN — ASPIRIN 81 MG: 81 TABLET, COATED ORAL at 09:19

## 2021-01-12 ASSESSMENT — PAIN DESCRIPTION - DESCRIPTORS
DESCRIPTORS: ACHING

## 2021-01-12 ASSESSMENT — PAIN DESCRIPTION - PAIN TYPE
TYPE: ACUTE PAIN
TYPE: ACUTE PAIN

## 2021-01-12 ASSESSMENT — PAIN DESCRIPTION - LOCATION
LOCATION: HIP

## 2021-01-12 ASSESSMENT — PAIN DESCRIPTION - ORIENTATION
ORIENTATION: RIGHT

## 2021-01-12 ASSESSMENT — PAIN SCALES - GENERAL
PAINLEVEL_OUTOF10: 8
PAINLEVEL_OUTOF10: 7
PAINLEVEL_OUTOF10: 7
PAINLEVEL_OUTOF10: 4
PAINLEVEL_OUTOF10: 0
PAINLEVEL_OUTOF10: 0
PAINLEVEL_OUTOF10: 7
PAINLEVEL_OUTOF10: 3
PAINLEVEL_OUTOF10: 7
PAINLEVEL_OUTOF10: 7

## 2021-01-12 ASSESSMENT — PAIN DESCRIPTION - FREQUENCY: FREQUENCY: CONTINUOUS

## 2021-01-12 NOTE — PROGRESS NOTES
Nutrition Education/Counseling:  No recommendation at this time   Coordination of Nutrition Care:  Continue to monitor while inpatient    Goals:  Patient will eat 50% or greater of meals and supplements.        Nutrition Monitoring and Evaluation:   Behavioral-Environmental Outcomes:      Food/Nutrient Intake Outcomes:  Food and Nutrient Intake, Supplement Intake  Physical Signs/Symptoms Outcomes:  None Identified     Discharge Planning:    Continue current diet, Continue Oral Nutrition Supplement     Electronically signed by Francoise Au RD, LD on 1/12/21 at 4:24 PM EST    Contact: Office: [phone]; [address] [place] [address] Road: [phone]

## 2021-01-12 NOTE — PROGRESS NOTES
Occupational Therapy  Facility/Department: [facility] C5 - MED SURG/ORTHO  Daily Treatment Note  NAME: Elke Nation  : 1960  MRN: [id number]    Date of Service: 2021    Discharge Recommendations:  Home with assist PRN, Home with Home health OT     Assessment   Performance deficits / Impairments: Decreased functional mobility ; Decreased ADL status; Decreased high-level IADLs  Assessment: Pt demos good progress toward OT goals this session. Pt educated on use of leg  for bed mobility, pt return demos bed mobility S level. Pt completing all bathroom/functional mobility with SW and S. Pt would benefit from continued skilled OT to address the above noted occupational performance deficits. Prognosis: Good  OT Education: OT Role;Plan of Care;Precautions; ADL Adaptive Strategies;IADL Safety  Patient Education: disease specific: use of AE, safety with transfers  REQUIRES OT FOLLOW UP: Yes  Activity Tolerance  Activity Tolerance: Patient Tolerated treatment well  Safety Devices  Safety Devices in place: Yes  Type of devices: Left in chair;Call light within reach;Nurse notified;Gait belt         Patient Diagnosis(es): There were no encounter diagnoses. has a past medical history of Acid reflux, Allergic rhinitis, cause unspecified, Arthritis, Attention deficit disorder without mention of hyperactivity, Cough variant asthma, Cough variant asthma, Depression, Edema, HSV-1 infection, Hypercholesteremia, Hyperglycemia, Hypothyroidism, Lumbago, Migraine headache, Osteoarthritis of right hip, Posterior vitreous detachment of left eye, Routine gynecological examination, and Stress incontinence. has a past surgical history that includes Breast lumpectomy; Rotator cuff repair (Right, ); Tonsillectomy and adenoidectomy; Foot surgery (Right, 2016); Rotator cuff repair (Left, 2017); and Total hip arthroplasty (Right, 2021).     Restrictions  Restrictions/Precautions Restrictions/Precautions: Weight Bearing, General Precautions, Fall Risk, ROM Restrictions  Lower Extremity Weight Bearing Restrictions  Right Lower Extremity Weight Bearing: Weight Bearing As Tolerated  Position Activity Restriction  Hip Precautions: No hip flexion > 90 degrees, No ADduction, No hip internal rotation    Subjective   General  Chart Reviewed: Yes  Patient assessed for rehabilitation services?: Yes  Response to previous treatment: Patient with no complaints from previous session  Family / Caregiver Present: No  Referring Practitioner: Dr. Hero Nina  Diagnosis: Right hip OA; s/p Right THR posterior-lateral approach 1-11-21    Subjective  Subjective: Pt resting in bed, agreeable to OT treatment.     Pain Assessment  Pain Assessment: 0-10  Pain Level: 7  Pain Type: Surgical pain  Pain Location: Hip  Pain Orientation: Right  Non-Pharmaceutical Pain Intervention(s): Ambulation/Increased Activity;Repositioned  Pre Treatment Pain Screening  Intervention List: Patient able to continue with treatment  Vital Signs  Patient Currently in Pain: Yes     Orientation  Orientation  Overall Orientation Status: Within Normal Limits     Objective    ADL  Grooming: Supervision(in stance at sink)  LE Dressing: (Pt declines trial, reports using sock aid and dressing stick at baseline.)  Toileting: Modified independent      Balance  Sitting Balance: Independent  Standing Balance: Supervision(with SW)  Standing Balance  Activity: functional/bathroom mobility, sink ADLs, toileting    Functional Mobility  Functional - Mobility Device: Standard Walker  Activity: To/from bathroom  Assist Level: Supervision    Toilet Transfers  Toilet - Technique: Ambulating  Equipment Used: Standard toilet(with use of grab bars)  Toilet Transfer: Supervision    Bed mobility  Supine to Sit: Supervision  Sit to Supine: Supervision Comment: Pt using sheet as leg  for bed mobility, HOB slightly elevated but bedrails removed. Pt bed at home adjustable.      Transfers  Sit to stand: Supervision  Stand to sit: Supervision     Plan   Plan  Times per week: 4-6x/ week  Current Treatment Recommendations: Functional Mobility Training, Safety Education & Training, Positioning, Self-Care / ADL    AM-PAC Score  AM-PAC Inpatient Daily Activity Raw Score: 23 (01/12/21 1423)  AM-PAC Inpatient ADL T-Scale Score : 51.12 (01/12/21 1423)  ADL Inpatient CMS 0-100% Score: 15.86 (01/12/21 1423)  ADL Inpatient CMS G-Code Modifier : CI (01/12/21 1423)    Goals  Short term goals  Time Frame for Short term goals: 3 days(1-14-21)  Short term goal 1: supervision with bathroom mobility-- GOAL MET, pt natalee S 1/12/21  Short term goal 2: supervision with use of LE AE for dressing by  1-14-21-- discontinue goal per pt request, pt reports using AE at baseline 1/12/21  Short term goal 3: supervision standing ADL's for 5 minutes-- GOAL MET, pt demos S 1/12/21  Patient Goals   Patient goals : be able to take care of myself & my cats-- ongoing 1/12/21       Therapy Time   Individual Concurrent Group Co-treatment   Time In 1320         Time Out 1415         Minutes [address]/JIL

## 2021-01-12 NOTE — PROGRESS NOTES
Physical Therapy  Facility/Department: [facility] C5 - MED SURG/ORTHO  Daily Treatment Note  NAME: Rosa Silva  : 1960  MRN: [id number]    Date of Service: 2021    Discharge Recommendations:  Subacute/Skilled Nursing Facility   PT Equipment Recommendations  Equipment Needed: No  Other: defer to facility  If pt is unable to be seen after this session, please let this note serve as discharge summary. Please see case management note for discharge disposition. Thank you. Assessment   Body structures, Functions, Activity limitations: Decreased functional mobility ; Decreased high-level IADLs;Decreased ADL status; Decreased endurance;Decreased ROM; Decreased strength;Decreased balance; Increased pain  Assessment: Pt progressing well POD #1 ambulating up to 50' this am with SW and SBA, pt performed a full set of exs and demonstrated a good understanding of hip precautions. She required only SBA for bed mobility with HOB elevated and use of rails. Pt is recommended for SNF at D/C as she lives alone and has no available in home assist.  Treatment Diagnosis: decreased mobility s/p R NATASHA  Specific instructions for Next Treatment: progress gait, HEP  Prognosis: Good  Decision Making: Low Complexity  PT Education: Goals;Transfer Training;Disease Specific Education;PT Role;Functional Mobility Training;Plan of Care;General Safety;Weight-bearing Education;Home Exercise Program;Orientation;Precautions;Gait Training  Patient Education: pt educated on use of SW, hand placement with transfers, and hip precautions  Barriers to Learning: none  REQUIRES PT FOLLOW UP: Yes  Activity Tolerance  Activity Tolerance: Patient limited by pain; Patient limited by endurance; Patient limited by fatigue  Activity Tolerance: denies dizziness, BP stable     Patient Diagnosis(es): There were no encounter diagnoses. has a past medical history of Acid reflux, Allergic rhinitis, cause unspecified, Arthritis, Attention deficit disorder without mention of hyperactivity, Cough variant asthma, Cough variant asthma, Depression, Edema, HSV-1 infection, Hypercholesteremia, Hyperglycemia, Hypothyroidism, Lumbago, Migraine headache, Osteoarthritis of right hip, Posterior vitreous detachment of left eye, Routine gynecological examination, and Stress incontinence. has a past surgical history that includes Breast lumpectomy; Rotator cuff repair (Right, 2009); Tonsillectomy and adenoidectomy; Foot surgery (Right, 05/2016); Rotator cuff repair (Left, 12/2017); and Total hip arthroplasty (Right, 1/11/2021). Restrictions  Restrictions/Precautions  Restrictions/Precautions: Weight Bearing, General Precautions, Fall Risk, ROM Restrictions  Lower Extremity Weight Bearing Restrictions  Right Lower Extremity Weight Bearing: Weight Bearing As Tolerated  Position Activity Restriction  Hip Precautions: No hip flexion > 90 degrees, No ADduction, No hip internal rotation  Other position/activity restrictions: While in bed and while turning for 24 hours then use regular pillow. 1) Up to bedside evening of surgery 2) Bathroom privileges with assistance  3)  Ambulate in room progressing to hallway with assistive device four times daily (including PT) when PT advises. 4)  Bathroom privileges with assistance. 1)  Up to bedside chair at least three times a day as tolerated. 2)  Ambulate in room progressing to hallway with assistive device four times daily (including PT) when PT advises. 3)  Bathroom privileges with assistance. Subjective   General  Chart Reviewed: Yes  Response To Previous Treatment: Patient with no complaints from previous session.   Family / Caregiver Present: No  Referring Practitioner: Laura Adams  Subjective  Subjective: Pt agreeable, asks appropriate questions  Pain Screening  Patient Currently in Pain: Yes  Pain Assessment Pain Assessment: 0-10  Pain Level: 7  Pain Type: Surgical pain  Pain Location: Hip  Pain Orientation: Right  Non-Pharmaceutical Pain Intervention(s): Emotional support; Ambulation/Increased Activity; Rest;Repositioned  Vital Signs  Patient Currently in Pain: Yes       Orientation  Orientation  Overall Orientation Status: Within Normal Limits  Cognition      Objective   Bed mobility  Supine to Sit: Supervision  Sit to Supine: Supervision(using sheet to assist RLE)  Transfers  Sit to Stand: Stand by assistance  Stand to sit: Stand by assistance  Bed to Chair: Stand by assistance  Ambulation  Ambulation?: Yes  WB Status: WBAT RLE  Ambulation 1  Surface: level tile  Device: Standard Walker  Assistance: Stand by assistance  Quality of Gait: Pt ambulates with decreased sofi and step length height with pt initially sliding RLE and progressing to taking steps with cues for sequencing steps with SW and offloading with BUE for pain  Gait Deviations: Slow Osfi;Decreased step length;Decreased step height  Distance: 15', 50' to toilet and out into the room  Stairs/Curb  Stairs?: No        Exercises  Quad Sets: X 15 BLE  Heelslides: X 15 RLE supine  Gluteal Sets: X 15 BLE  Knee Short Arc Quad: X 15  Ankle Pumps: X 15 BLE  Comments: reviewed hip precautions, pt demonstrates and verbalizesgood understanding        AM-PAC Score     AM-PAC Inpatient Mobility without Stair Climbing Raw Score : 16 (01/12/21 1530)  AM-PAC Inpatient without Stair Climbing T-Scale Score : 45.54 (01/12/21 1530)  Mobility Inpatient CMS 0-100% Score: 40.64 (01/12/21 1530)  Mobility Inpatient without Stair CMS G-Code Modifier : CK (01/12/21 1530)       Goals  Short term goals  Time Frame for Short term goals: 1/15/20  Short term goal 1: Pt will complete bed mobility independently. ; 1/12 SBA  Short term goal 2: Pt will complete transfers mod I with SW.; 1/12 SBA  Short term goal 3: Pt will ambulate 50 ft with SW mod I.; 1/12 SBA X 50' with SW

## 2021-01-12 NOTE — PROGRESS NOTES
Physical Therapy  Facility/Department: [facility] C5 - MED SURG/ORTHO  Daily Treatment Note  NAME: Deniz Middleton  : 1960  MRN: [id number]    Date of Service: 2021    Discharge Recommendations:  Subacute/Skilled Nursing Facility   PT Equipment Recommendations  Equipment Needed: No  Other: defer to facility  If pt is unable to be seen after this session, please let this note serve as discharge summary. Please see case management note for discharge disposition. Thank you. Assessment   Body structures, Functions, Activity limitations: Decreased functional mobility ; Decreased high-level IADLs;Decreased ADL status; Decreased endurance;Decreased ROM; Decreased strength;Decreased balance; Increased pain  Assessment: Pt progressing well POD #1 ambulating up to 150' this pm with SW and SBA, pt performed a full set of exs and demonstrated a good understanding of hip precautions. She required only SBA for bed mobility using a sheet to assist the RLE. Pt is recommended for SNF at D/C as she lives alone and has no available in home assist.  Treatment Diagnosis: decreased mobility s/p R NATASHA  Specific instructions for Next Treatment: progress gait, HEP  Prognosis: Good  Decision Making: Low Complexity  PT Education: Goals;Transfer Training;Disease Specific Education;PT Role;Functional Mobility Training;Plan of Care;General Safety;Weight-bearing Education;Home Exercise Program;Orientation;Precautions;Gait Training  Patient Education: pt educated on use of SW, hand placement with transfers, and hip precautions  Barriers to Learning: none  REQUIRES PT FOLLOW UP: Yes  Activity Tolerance  Activity Tolerance: Patient limited by pain; Patient limited by endurance; Patient limited by fatigue  Activity Tolerance: denies dizziness, BP stable     Patient Diagnosis(es): There were no encounter diagnoses. has a past medical history of Acid reflux, Allergic rhinitis, cause unspecified, Arthritis, Attention deficit disorder without mention of hyperactivity, Cough variant asthma, Cough variant asthma, Depression, Edema, HSV-1 infection, Hypercholesteremia, Hyperglycemia, Hypothyroidism, Lumbago, Migraine headache, Osteoarthritis of right hip, Posterior vitreous detachment of left eye, Routine gynecological examination, and Stress incontinence. has a past surgical history that includes Breast lumpectomy; Rotator cuff repair (Right, 2009); Tonsillectomy and adenoidectomy; Foot surgery (Right, 05/2016); Rotator cuff repair (Left, 12/2017); and Total hip arthroplasty (Right, 1/11/2021). Restrictions  Restrictions/Precautions  Restrictions/Precautions: Weight Bearing, General Precautions, Fall Risk, ROM Restrictions  Lower Extremity Weight Bearing Restrictions  Right Lower Extremity Weight Bearing: Weight Bearing As Tolerated  Position Activity Restriction  Hip Precautions: No hip flexion > 90 degrees, No ADduction, No hip internal rotation  Other position/activity restrictions: While in bed and while turning for 24 hours then use regular pillow. 1) Up to bedside evening of surgery 2) Bathroom privileges with assistance  3)  Ambulate in room progressing to hallway with assistive device four times daily (including PT) when PT advises. 4)  Bathroom privileges with assistance. 1)  Up to bedside chair at least three times a day as tolerated. 2)  Ambulate in room progressing to hallway with assistive device four times daily (including PT) when PT advises. 3)  Bathroom privileges with assistance. Subjective   General  Chart Reviewed: Yes  Response To Previous Treatment: Patient with no complaints from previous session.   Family / Caregiver Present: No  Referring Practitioner: Javier Cary  Subjective  Subjective: Pt agreeable, asks appropriate questions  Pain Screening  Patient Currently in Pain: Yes  Pain Assessment Pain Assessment: 0-10  Pain Level: 7  Pain Type: Surgical pain  Pain Location: Hip  Pain Orientation: Right  Non-Pharmaceutical Pain Intervention(s): Emotional support; Ambulation/Increased Activity; Rest;Repositioned  Vital Signs  Patient Currently in Pain: Yes       Orientation  Orientation  Overall Orientation Status: Within Normal Limits  Cognition      Objective   Bed mobility  Supine to Sit: Supervision  Sit to Supine: Supervision(using sheet to assist RLE)  Transfers  Sit to Stand: Stand by assistance  Stand to sit: Stand by assistance  Bed to Chair: Stand by assistance  Ambulation  Ambulation?: Yes  WB Status: WBAT RLE  Ambulation 1  Surface: level tile  Device: Standard Walker  Assistance: Stand by assistance  Quality of Gait: Pt ambulates with decreased sofi and step length height with pt initially sliding RLE and progressing to taking steps with cues for sequencing steps with SW and offloading with BUE for pain  Gait Deviations: Slow Sofi;Decreased step length;Decreased step height  Distance: 150'  Stairs/Curb  Stairs?: No        Exercises  Quad Sets: X 15 BLE  Gluteal Sets: X 15 BLE  Knee Long Arc Quad: X 20  Ankle Pumps: X 15 BLE  Comments: reviewed hip precautions, pt demonstrates and verbalizesgood understanding        AM-PAC Score     AM-PAC Inpatient Mobility without Stair Climbing Raw Score : 16 (01/12/21 1530)  AM-PAC Inpatient without Stair Climbing T-Scale Score : 45.54 (01/12/21 1530)  Mobility Inpatient CMS 0-100% Score: 40.64 (01/12/21 1530)  Mobility Inpatient without Stair CMS G-Code Modifier : CK (01/12/21 1530)       Goals  Short term goals  Time Frame for Short term goals: 1/15/20  Short term goal 1: Pt will complete bed mobility independently. ; 1/12 SBA  Short term goal 2: Pt will complete transfers mod I with SW.; 1/12 SBA  Short term goal 3: Pt will ambulate 50 ft with SW mod I.; 1/12 SBA X 50' with SW Short term goal 4: Pt will tolerate 12-15 reps of LE exercise for strengthening and endurance by 1/13/20.; 1/12 met  Short term goal 5: Pt will ambulate to bathroom with SW and min A; goal met 1/11 - pt ambulates to bathroom with SW and CGA. ,  Patient Goals   Patient goals : [place] to the bathroom\"; 1/12 met    Plan    Plan  Times per week: BID x7  Times per day: Twice a day  Plan weeks: 1/15/20  Specific instructions for Next Treatment: progress gait, HEP  Current Treatment Recommendations: Strengthening, Transfer Training, Endurance Training, Patient/Caregiver Education & Training, Balance Training, Gait Training, Home Exercise Program, Functional Mobility Training, Safety Education & Training  Safety Devices  Type of devices:  All fall risk precautions in place, Call light within reach, Chair alarm in place, Gait belt, Left in chair, Nurse notified  Restraints  Initially in place: No     Therapy Time   Individual Concurrent Group Co-treatment   Time In 1410         Time Out 1450         Minutes [address] [facility], [address] [place] [address]

## 2021-01-12 NOTE — CONSULTS
Hospital Medicine  Consult History & Physical        Chief Complaint: Elective right hip arthroplasty    Date of Service: Pt seen/examined in consultation on 1/11/2021    History Of Present Illness:      61 y.o. female who we are asked to see/evaluate by Yamini Cantor MD for medical management  Pt presented today for an elective surgery right hip arthroplasty by Dr. Fly Rajput. Surgery went as planned without any complications, EBL was 091 cc as reported on surgical procedure note. Pt is currently seen on the floor post op. She seems to be doing fairly well, c/o mild soreness in the right hip region that is well controlled with the pain medications ordered, apart from that she denies any chest pain, dyspnea, abdominal pain, fevers or chills, nausea or vomiting. Hospitalist team was consulted for medical management. Past Medical History:        Diagnosis Date    Acid reflux     Allergic rhinitis, cause unspecified     Arthritis     Attention deficit disorder without mention of hyperactivity 8/30/2011    Cough variant asthma     Cough variant asthma     Depression     Edema     HSV-1 infection     Hypercholesteremia     Hyperglycemia     Hypothyroidism     Lumbago     Migraine headache     Osteoarthritis of right hip 1/11/2021    Posterior vitreous detachment of left eye     Routine gynecological examination     Stress incontinence 9/28/2017       Past Surgical History:        Procedure Laterality Date    BREAST LUMPECTOMY      FOOT SURGERY Right 05/2016    fusion of metatarsal    ROTATOR CUFF REPAIR Right 2009    ROTATOR CUFF REPAIR Left 12/2017    TONSILLECTOMY AND ADENOIDECTOMY      TOTAL HIP ARTHROPLASTY Right 1/11/2021    RIGHT TOTAL HIP ARTHROPLASTY POSTERIOR,         RUBI BIOMET performed by Yamini Cantor MD at [facility]       Medications Prior to Admission:    Prior to Admission medications    Medication Sig Start Date End Date Taking?  Authorizing Provider albuterol sulfate HFA (PROVENTIL HFA) 108 (90 Base) MCG/ACT inhaler Inhale 2 puffs into the lungs every 6 hours as needed for Wheezing 1/5/21  Yes José Miguel Zamudio MD   levothyroxine (SYNTHROID) 50 MCG tablet TAKE 1 TABLET BY MOUTH EVERY DAY 1/4/21  Yes Tre Couch MD   furosemide (LASIX) 20 MG tablet TAKE 1 TABLET BY MOUTH TWICE A DAY 1/4/21  Yes Tre Couch MD   montelukast (SINGULAIR) 10 MG tablet Take 1 tablet by mouth daily 1/4/21  Yes Tre Couch MD   potassium chloride (KLOR-CON 10) 10 MEQ extended release tablet TAKE 1 TABLET BY MOUTH TWICE A DAY 1/4/21  Yes Tre Couch MD   atorvastatin (LIPITOR) 20 MG tablet TAKE 1 TABLET BY MOUTH EVERY DAY 1/4/21  Yes Tre Couch MD   traMADol (ULTRAM) 50 MG tablet Take 50 mg by mouth every 8 hours as needed for Pain. Yes Historical Provider, MD   Famotidine-Ca Carb-Mag Hydrox (PEPCID COMPLETE) -165 MG CHEW Take by mouth daily as needed   Yes Historical Provider, MD   acetaminophen (TYLENOL) 650 MG extended release tablet Take 1,300 mg by mouth 2 times daily as needed    Yes Historical Provider, MD   clotrimazole-betamethasone (LOTRISONE) 1-0.05 % cream Apply topically 2 times daily. 11/11/20  Yes Tre Couch MD   NONFORMULARY Take 1 tablet by mouth nightly as needed Indications: Hylands leg cramps   Yes Historical Provider, MD   topiramate (TOPAMAX) 100 MG tablet Take 100 mg by mouth 2 times daily    Historical Provider, MD   SUMAtriptan (IMITREX) 100 MG tablet TAKE ONE TABLET BY MOUTH AT ONSET OF HEADACHE; MAY REPEAT ONE TABLET IN 2 HOURS IF NEEDED.  MAX 2 TABLETS IN 24 HOURS. 1/4/21   Tre Couch MD   aspirin 81 MG tablet Take 81 mg by mouth daily    Historical Provider, MD   ibuprofen (ADVIL;MOTRIN) 600 MG tablet Take 800 mg by mouth every 8 hours as needed for Pain     Historical Provider, MD       Allergies:  Flexeril [cyclobenzaprine], Levaquin [levofloxacin in d5w], and Nickel    Social History: The patient currently lives at home  TOBACCO:   reports that she quit smoking about 29 years ago. Her smoking use included cigarettes. She has a 16.00 pack-year smoking history. She has never used smokeless tobacco.  ETOH:   reports no history of alcohol use. Family History:    Positive as follows:        Problem Relation Age of Onset    Brain Cancer Mother 58    Hypertension Mother     Osteoarthritis Brother 67    High Cholesterol Brother     Osteoporosis Sister 71    High Cholesterol Sister     Hyperparathyroidism  Sister     Hypertension Father 61    Coronary Art Dis Father     High Cholesterol Father     High Cholesterol Brother 79    Osteoarthritis Brother     Prostate Cancer Brother     Hypertension Brother 62    Bipolar Disorder Brother     Substance Abuse Brother     Alcohol Abuse Brother     Hypertension Brother 72    Diabetes type 2  Brother     Prostate Cancer Brother     Osteoarthritis Brother     Osteoarthritis Brother 59    Atrial Fibrillation Sister 61    Osteoarthritis Sister     Hypertension Sister     Kidney Disease Sister     Diabetes type 2  Sister     Osteoarthritis Brother 64    Diabetes type 2  Brother     Ulcerative Colitis Brother     Bipolar Disorder Brother     Cancer Brother        REVIEW OF SYSTEMS:   Pertinent positives as noted in the HPI. All other systems reviewed and negative. PHYSICAL EXAM PERFORMED:  /81   Pulse 82   Temp 98.1 °F (36.7 °C) (Oral)   Resp 16   Ht 5' 6\" (1.676 m)   Wt 244 lb (110.7 kg)   LMP 08/26/2019   SpO2 94%   BMI 39.38 kg/m²   General appearance: No apparent distress, appears stated age and cooperative. HEENT: Normal cephalic, atraumatic without obvious deformity. Pupils equal, round, and reactive to light. Extra ocular muscles intact. Conjunctivae/corneas clear. Neck: Supple, with full range of motion. No jugular venous distention. Trachea midline. Respiratory:  Normal respiratory effort. Clear to auscultation, bilaterally without Rales/Wheezes/Rhonchi. Cardiovascular: Regular rate and rhythm with normal S1/S2 without murmurs, rubs or gallops. Abdomen: Soft, non-tender, non-distended with normal bowel sounds. Musculoskeletal: No clubbing, cyanosis or edema bilaterally. Right hip dressing, good ROM given post operative status  Skin: Skin color, texture, turgor normal.  No rashes or lesions. Neurologic:  Neurovascularly intact without any focal sensory/motor deficits. Cranial nerves: II-XII intact, grossly non-focal.  Psychiatric: Alert and oriented, thought content appropriate, normal insight  Capillary Refill: Brisk,< 3 seconds   Peripheral Pulses: +2 palpable, equal bilaterally     Labs:     No results for input(s): WBC, HGB, HCT, PLT in the last 72 hours. No results for input(s): NA, K, CL, CO2, BUN, CREATININE, CALCIUM, PHOS in the last 72 hours. Invalid input(s): MAGNES  No results for input(s): AST, ALT, BILIDIR, BILITOT, ALKPHOS in the last 72 hours. No results for input(s): INR in the last 72 hours. No results for input(s): Hinkle Tacho in the last 72 hours. Urinalysis:    Lab Results   Component Value Date    NITRU Negative 12/28/2020    BACTERIA SEE NOTES 02/19/2015    RBCUA 3+ 02/19/2015    BLOODU Negative 12/28/2020    SPECGRAV 1.020 12/28/2020    GLUCOSEU Negative 12/28/2020       Radiology: I have reviewed the radiology reports with the following interpretation:     XR PELVIS (1-2 VIEWS)   Final Result   Postoperative appearance as above.                ASSESSMENT:PLAN:    Osteoarthritis of right hip  Status post total right hip arthroplasty  Postop pain management per orthopedics   DVT prophylaxis  Incentive spirometry  Follow-up CBC in AM    Hypothyroidism -resume levothyroxine    Hyperlipidemia-resume statin    History of migraines -resume Topamax and Imitrex as needed Asthma -stable.   Albuterol inhaler as needed, resume Singulair      DVT Prophylaxis: Aspirin twice daily  Diet: DIET GENERAL;  Code Status: Full Code    PT/OT Eval Status: Active and ongoing    Dispo -per orthopedics    Thank you for the consultation, will follow up as needed    Electronically signed by Sofia Russell MD on 1/11/21 at 9:09 PM EST

## 2021-01-12 NOTE — CARE COORDINATION
CASE MANAGEMENT INITIAL ASSESSMENT      Reviewed chart and completed assessment with: Pt  Explained Case Management role/services. Primary contact information:Sister Natalio, [address] Decision Maker :   Primary Decision Maker: Louann Glass - Brother/Sister - [phone]    Secondary Decision Maker: Stanislav Linder - Brother/Sister - [phone]    Supplemental (Other) Decision Maker: Ronnie Zuniga - [phone]          Can this person be reached and be able to respond quickly, such as within a few minutes or hours? Yes  Who would be your back-up decision maker? Name: Sister Donnie  Phone Number: [phone]    Admit date/status: IP, 1/11/21  Diagnosis: AL Total Hip Arthoplasty. Is this a Readmission?:  No      Insurance: [employer] [facility] [employer]. Precert required for SNF: No       3 night stay required: No    Living arrangements, Adls, care needs, prior to admission: Pt lives in a one floor house with 0 SE alone. Independent in ADL's and drives. Transportation: [address] at home:  Walker_X_Cane_X_RTS__ BSC__Shower Chair__  02__ HHN__ CPAP__  BiPap__  Hospital Bed__ W/C___ Other__________    Services in the home and/or outpatient, prior to admission: None    PT/OT recs: SNF    Hospital Exemption Notification (HEN): needed for SNF, not initiated. Barriers to discharge: None    Plan/comments: CM met with pt at bedside for initial assessment and to discuss therapy recs for SNF. Pt would like referrals to [facility] and [facility].  Referrals faxed, CM following-Prachi Brandon RN      ECOC on chart for MD signature

## 2021-01-12 NOTE — PROGRESS NOTES
Department of Orthopedic Surgery  Physician Assistant   Progress Note    Subjective:     Post-Operative Day: 1 Status Post right Total Hip Arthroplasty  Systemic or Specific Complaints:Pain Control today. Increased pain after working with therapy today. Objective:     Patient Vitals for the past 24 hrs:   BP Temp Temp src Pulse Resp SpO2   01/12/21 0821 129/74 97.8 °F (36.6 °C) Oral 77 16 99 %   01/12/21 0513 105/64        01/12/21 0413 102/68 97.9 °F (36.6 °C) Oral 76 18 96 %   01/12/21 0001 104/65        01/11/21 2252 99/62 98 °F (36.7 °C) Oral 78 18 95 %   01/11/21 2111 104/66 97.9 °F (36.6 °C) Oral 87 16 94 %   01/11/21 1700  98.1 °F (36.7 °C) Oral 82 16    01/11/21 1600 134/81 98.2 °F (36.8 °C) Oral 95 16 94 %   01/11/21 1514 126/87    18    01/11/21 1500   Oral  16    01/11/21 1415 135/83 97.8 °F (36.6 °C) Oral 80 18 98 %       General: alert, appears stated age, cooperative and no distress   Wound: Wound clean and dry no evidence of infection. Motion: Painful range of Motion   DVT Exam: No evidence of DVT seen on physical exam.       NVI in lower extremity. Thigh swollen but soft. Moving foot and ankle. mepilex clean and dry  . Data Review  CBC:   Lab Results   Component Value Date    WBC 14.9 01/12/2021    RBC 3.74 01/12/2021    HGB 11.3 01/12/2021    HCT 34.6 01/12/2021     01/12/2021       Assessment:     Status Post right Total Hip Arthroplasty. Doing well postoperatively. Plan:      1: Continues current post-op course, IM following. Labs reviewed and stable today. 2:  Continue Deep venous thrombosis prophylaxis- ASA 81mg BID  3:  Continue physical therapy and OT, WBAT. Posterior hip precautions  4:  Continue Pain Control PRN  5:  D/c planning for SNF placement, discussed with patient. She does not have assistance at home. DCP updated and following.       Xavi Guzmán PA-C

## 2021-01-12 NOTE — CARE COORDINATION
[facility]- Spoke to Paige Pope and out of Network. [facility] [phone]- Spoke to Judy Lecuhga in admissions and out of network. Met with pt at bedside and she would like referral to [facility]. Referral faxed. CM following-Prachi Brandon RN      Spoke to Mati Lantigua and they can accept and precert started on this day. Pt needs a RAPID COVID test done on day of DC.  CM following-Prachi Brandon RN

## 2021-01-12 NOTE — PROGRESS NOTES
Hospitalist Progress Note      PCP: Ayah Mills MD    Date of Admission: 1/11/2021    Chief Complaint: Right hip pain    Hospital Course: S/p R NATASHA d/t OA on 1/11. Subjective:   Pt is on RA. Afebrile. VSS. No complaints currently. Medications:  Reviewed    Infusion Medications    sodium chloride 125 mL/hr at 01/11/21 1438     Scheduled Medications    celecoxib  100 mg Oral BID    gabapentin  300 mg Oral TID    furosemide  20 mg Oral BID    levothyroxine  50 mcg Oral Daily    montelukast  10 mg Oral Daily    potassium chloride  10 mEq Oral BID    topiramate  100 mg Oral BID    sodium chloride flush  10 mL Intravenous 2 times per day    acetaminophen  650 mg Oral Q6H    sennosides-docusate sodium  1 tablet Oral BID    aspirin  81 mg Oral BID    atorvastatin  20 mg Oral Nightly     PRN Meds: SUMAtriptan, sodium chloride flush, magnesium hydroxide, oxyCODONE **OR** oxyCODONE, HYDROmorphone **OR** HYDROmorphone, ondansetron **OR** ondansetron, famotidine      Intake/Output Summary (Last 24 hours) at 1/12/2021 1813  Last data filed at 1/11/2021 1824  Gross per 24 hour   Intake 120 ml   Output    Net 120 ml       Physical Exam Performed:    /72   Pulse 91   Temp 98.2 °F (36.8 °C) (Oral)   Resp 17   Ht 5' 6\" (1.676 m)   Wt 244 lb (110.7 kg)   LMP 08/26/2019   SpO2 97%   BMI 39.38 kg/m²     General appearance: No apparent distress, appears stated age and cooperative. HEENT: Pupils equal, round, and reactive to light. Conjunctivae/corneas clear. Neck: Supple, with full range of motion. No jugular venous distention. Trachea midline. Respiratory:  Normal respiratory effort. Clear to auscultation, bilaterally without Rales/Wheezes/Rhonchi. Cardiovascular: Regular rate and rhythm with normal S1/S2 without murmurs, rubs or gallops. Abdomen: Soft, non-tender, non-distended with normal bowel sounds. Musculoskeletal: No clubbing, cyanosis or edema bilaterally. Right hip dressing C/D/I. Skin: Skin color, texture, turgor normal.  No rashes or lesions. Neurologic:  Neurovascularly intact without any focal sensory/motor deficits. Cranial nerves: II-XII intact, grossly non-focal.  Psychiatric: Alert and oriented, thought content appropriate, normal insight  Capillary Refill: Brisk,< 3 seconds   Peripheral Pulses: +2 palpable, equal bilaterally       Labs:   Recent Labs     01/12/21  0628   WBC 14.9*   HGB 11.3*   HCT 34.6*        Recent Labs     01/12/21  0628      K 4.3      CO2 26   BUN 13   CREATININE 0.7   CALCIUM 9.3     Urinalysis:      Lab Results   Component Value Date    NITRU Negative 12/28/2020    BACTERIA SEE NOTES 02/19/2015    RBCUA 3+ 02/19/2015    BLOODU Negative 12/28/2020    SPECGRAV 1.020 12/28/2020    GLUCOSEU Negative 12/28/2020       Radiology:  XR PELVIS (1-2 VIEWS)   Final Result   Postoperative appearance as above. Assessment/Plan:    Active Hospital Problems    Diagnosis    Status post total replacement of right hip [Z96.641]    Osteoarthritis of right hip [M16.11]    Migraine without aura and without status migrainosus, not intractable [G43.009]    Depression [F32.9]    Hypothyroidism [E03.9]    Hypercholesteremia [E78.00]       Status post right NATASHA due to OA on 1/11/21:  - Management per Orthopedic surgery. - Continue prn analgesia, bowel regimen, IS, DVT prophylaxis and PT/OT    Hypothyroidism:  - Clinically euthyroid. Continue her home dose of levothyroxine.     Hyperlipidemia:  - Continue statin.     History of migraines:  - ContinueTopamax and Imitrex as needed.     Asthma:  - Stable. Albuterol inhaler as needed, continue Singulair.     Obesity: - With Body mass index is 39.38 kg/m². Complicating assessment and treatment. Placing patient at risk for multiple co-morbidities as well as early death and contributing to the patient's presentation. Counseled on weight loss. DVT Prophylaxis: aspirin  Diet: DIET GENERAL;  Dietary Nutrition Supplements: Low Calorie High Protein Supplement  Code Status: Full Code    PT/OT Eval Status: active and ongoing    Dispo - Per primary service.     [address], APRN - CNP

## 2021-01-13 ENCOUNTER — TELEPHONE (OUTPATIENT)
Dept: ORTHOPEDIC SURGERY | Age: 61
End: 2021-01-13

## 2021-01-13 VITALS
BODY MASS INDEX: 39.21 KG/M2 | OXYGEN SATURATION: 96 % | DIASTOLIC BLOOD PRESSURE: 69 MMHG | WEIGHT: 244 LBS | TEMPERATURE: 97.9 F | HEIGHT: 66 IN | SYSTOLIC BLOOD PRESSURE: 110 MMHG | HEART RATE: 88 BPM | RESPIRATION RATE: 16 BRPM

## 2021-01-13 LAB
ANION GAP SERPL CALCULATED.3IONS-SCNC: 8 MMOL/L (ref 3–16)
BUN BLDV-MCNC: 17 MG/DL (ref 7–20)
CALCIUM SERPL-MCNC: 9.6 MG/DL (ref 8.3–10.6)
CHLORIDE BLD-SCNC: 107 MMOL/L (ref 99–110)
CO2: 26 MMOL/L (ref 21–32)
CREAT SERPL-MCNC: 0.6 MG/DL (ref 0.6–1.2)
GFR AFRICAN AMERICAN: >60
GFR NON-AFRICAN AMERICAN: >60
GLUCOSE BLD-MCNC: 104 MG/DL (ref 70–99)
HCT VFR BLD CALC: 35.3 % (ref 36–48)
HEMOGLOBIN: 11.6 G/DL (ref 12–16)
MCH RBC QN AUTO: 30.6 PG (ref 26–34)
MCHC RBC AUTO-ENTMCNC: 33 G/DL (ref 31–36)
MCV RBC AUTO: 92.7 FL (ref 80–100)
PDW BLD-RTO: 14.2 % (ref 12.4–15.4)
PLATELET # BLD: 173 K/UL (ref 135–450)
PMV BLD AUTO: 8.9 FL (ref 5–10.5)
POTASSIUM REFLEX MAGNESIUM: 3.8 MMOL/L (ref 3.5–5.1)
RBC # BLD: 3.8 M/UL (ref 4–5.2)
SARS-COV-2, NAAT: NOT DETECTED
SODIUM BLD-SCNC: 141 MMOL/L (ref 136–145)
WBC # BLD: 10.7 K/UL (ref 4–11)

## 2021-01-13 PROCEDURE — 80048 BASIC METABOLIC PNL TOTAL CA: CPT

## 2021-01-13 PROCEDURE — 97530 THERAPEUTIC ACTIVITIES: CPT

## 2021-01-13 PROCEDURE — 6370000000 HC RX 637 (ALT 250 FOR IP): Performed by: ANESTHESIOLOGY

## 2021-01-13 PROCEDURE — 2580000003 HC RX 258: Performed by: ORTHOPAEDIC SURGERY

## 2021-01-13 PROCEDURE — 85027 COMPLETE CBC AUTOMATED: CPT

## 2021-01-13 PROCEDURE — U0002 COVID-19 LAB TEST NON-CDC: HCPCS

## 2021-01-13 PROCEDURE — 97110 THERAPEUTIC EXERCISES: CPT

## 2021-01-13 PROCEDURE — 36415 COLL VENOUS BLD VENIPUNCTURE: CPT

## 2021-01-13 PROCEDURE — 6370000000 HC RX 637 (ALT 250 FOR IP): Performed by: NURSE PRACTITIONER

## 2021-01-13 PROCEDURE — 6360000002 HC RX W HCPCS: Performed by: ORTHOPAEDIC SURGERY

## 2021-01-13 PROCEDURE — 97116 GAIT TRAINING THERAPY: CPT

## 2021-01-13 PROCEDURE — 6370000000 HC RX 637 (ALT 250 FOR IP): Performed by: ORTHOPAEDIC SURGERY

## 2021-01-13 PROCEDURE — APPNB30 APP NON BILLABLE TIME 0-30 MINS: Performed by: PHYSICIAN ASSISTANT

## 2021-01-13 RX ORDER — CELECOXIB 100 MG/1
100 CAPSULE ORAL 2 TIMES DAILY
Qty: 60 CAPSULE | Refills: 0
Start: 2021-01-13 | End: 2021-05-11

## 2021-01-13 RX ORDER — ASPIRIN 81 MG/1
81 TABLET ORAL 2 TIMES DAILY
Qty: 60 TABLET | Refills: 0
Start: 2021-01-13 | End: 2021-01-14 | Stop reason: SDUPTHER

## 2021-01-13 RX ORDER — OXYCODONE HYDROCHLORIDE 5 MG/1
5-10 TABLET ORAL EVERY 4 HOURS PRN
Qty: 10 TABLET | Refills: 0 | Status: SHIPPED | OUTPATIENT
Start: 2021-01-13 | End: 2021-01-14 | Stop reason: SDUPTHER

## 2021-01-13 RX ADMIN — FUROSEMIDE 40 MG: 40 TABLET ORAL at 08:53

## 2021-01-13 RX ADMIN — HYDROMORPHONE HYDROCHLORIDE 0.5 MG: 1 INJECTION, SOLUTION INTRAMUSCULAR; INTRAVENOUS; SUBCUTANEOUS at 02:03

## 2021-01-13 RX ADMIN — HYDROMORPHONE HYDROCHLORIDE 0.25 MG: 1 INJECTION, SOLUTION INTRAMUSCULAR; INTRAVENOUS; SUBCUTANEOUS at 13:42

## 2021-01-13 RX ADMIN — ACETAMINOPHEN 650 MG: 325 TABLET ORAL at 15:27

## 2021-01-13 RX ADMIN — LEVOTHYROXINE SODIUM 50 MCG: 0.05 TABLET ORAL at 05:42

## 2021-01-13 RX ADMIN — OXYCODONE 10 MG: 5 TABLET ORAL at 15:27

## 2021-01-13 RX ADMIN — KETOROLAC TROMETHAMINE 30 MG: 30 INJECTION, SOLUTION INTRAMUSCULAR at 08:58

## 2021-01-13 RX ADMIN — KETOROLAC TROMETHAMINE 30 MG: 30 INJECTION, SOLUTION INTRAMUSCULAR at 19:45

## 2021-01-13 RX ADMIN — ACETAMINOPHEN 650 MG: 325 TABLET ORAL at 05:41

## 2021-01-13 RX ADMIN — ACETAMINOPHEN 650 MG: 325 TABLET ORAL at 10:29

## 2021-01-13 RX ADMIN — MONTELUKAST SODIUM 10 MG: 10 TABLET ORAL at 08:53

## 2021-01-13 RX ADMIN — GABAPENTIN 300 MG: 300 CAPSULE ORAL at 08:53

## 2021-01-13 RX ADMIN — OXYCODONE 5 MG: 5 TABLET ORAL at 10:29

## 2021-01-13 RX ADMIN — Medication 10 ML: at 08:54

## 2021-01-13 RX ADMIN — CELECOXIB 100 MG: 100 CAPSULE ORAL at 08:53

## 2021-01-13 RX ADMIN — Medication 10 ML: at 19:46

## 2021-01-13 RX ADMIN — ASPIRIN 81 MG: 81 TABLET, COATED ORAL at 08:53

## 2021-01-13 RX ADMIN — GABAPENTIN 300 MG: 300 CAPSULE ORAL at 13:38

## 2021-01-13 RX ADMIN — OXYCODONE 10 MG: 5 TABLET ORAL at 05:40

## 2021-01-13 RX ADMIN — POTASSIUM CHLORIDE 10 MEQ: 750 TABLET, EXTENDED RELEASE ORAL at 08:53

## 2021-01-13 RX ADMIN — DOCUSATE SODIUM 50MG AND SENNOSIDES 8.6MG 1 TABLET: 8.6; 5 TABLET, FILM COATED ORAL at 08:54

## 2021-01-13 ASSESSMENT — PAIN SCALES - GENERAL
PAINLEVEL_OUTOF10: 6
PAINLEVEL_OUTOF10: 4
PAINLEVEL_OUTOF10: 4
PAINLEVEL_OUTOF10: 6
PAINLEVEL_OUTOF10: 6

## 2021-01-13 ASSESSMENT — PAIN DESCRIPTION - ORIENTATION: ORIENTATION: RIGHT

## 2021-01-13 ASSESSMENT — PAIN DESCRIPTION - LOCATION: LOCATION: HIP

## 2021-01-13 NOTE — CARE COORDINATION
CASE MANAGEMENT DISCHARGE SUMMARY      Discharge to: [facility] Sk[facility]. Precertification completed:  Yes  Hospital Exemption Notification (HENS) completed: Yes    New Durable Medical Equipment ordered/agency: None    Transportation:    Family/car: [employer]   Medical Transport explained to [employer]. Pt/family voice no agency preference. Agency used: [address] [place] [address]   time:  1800   Ambulance form completed: Yes    Confirmed discharge plan with: Met with pt at bedside and confirmed DCP. Patient: yes     Facility/Agency, name:  JL/AVS faxed- To [facility] [place], spoke to [facility]   Phone number for report to facility: [phone]     RN, name: Sheila Kidd    Note: Discharging nurse to complete JL, reconcile AVS, and place final copy with patient's discharge packet. RN to ensure that written prescriptions for  Level II medications are sent with patient to the facility as per protocol.

## 2021-01-13 NOTE — DISCHARGE SUMMARY
Department of Orthopedic Surgery  Physician Assistant   Discharge Summary      The Delores Carranza is a 61 y.o. female underwent total hip replacement procedure without complication. Delores Carranza was admitted to the floor following their recovery in the PACU.      Discharge Diagnosis  right Hip Replacement    Current Inpatient Medications    Current Facility-Administered Medications: SUMAtriptan (IMITREX) tablet 100 mg, 100 mg, Oral, PRN  ketorolac (TORADOL) injection 30 mg, 30 mg, Intravenous, Q6H PRN  furosemide (LASIX) tablet 40 mg, 40 mg, Oral, Daily  celecoxib (CELEBREX) capsule 100 mg, 100 mg, Oral, BID  gabapentin (NEURONTIN) capsule 300 mg, 300 mg, Oral, TID  levothyroxine (SYNTHROID) tablet 50 mcg, 50 mcg, Oral, Daily  montelukast (SINGULAIR) tablet 10 mg, 10 mg, Oral, Daily  potassium chloride (KLOR-CON M) extended release tablet 10 mEq, 10 mEq, Oral, BID  topiramate (TOPAMAX) tablet 100 mg, 100 mg, Oral, BID  sodium chloride flush 0.9 % injection 10 mL, 10 mL, Intravenous, 2 times per day  sodium chloride flush 0.9 % injection 10 mL, 10 mL, Intravenous, PRN  acetaminophen (TYLENOL) tablet 650 mg, 650 mg, Oral, Q6H  sennosides-docusate sodium (SENOKOT-S) 8.6-50 MG tablet 1 tablet, 1 tablet, Oral, BID  magnesium hydroxide (MILK OF MAGNESIA) 400 MG/5ML suspension 30 mL, 30 mL, Oral, Daily PRN  oxyCODONE (ROXICODONE) immediate release tablet 5 mg, 5 mg, Oral, Q4H PRN **OR** oxyCODONE (ROXICODONE) immediate release tablet 10 mg, 10 mg, Oral, Q4H PRN  HYDROmorphone (DILAUDID) injection 0.25 mg, 0.25 mg, Intravenous, Q3H PRN **OR** HYDROmorphone (DILAUDID) injection 0.5 mg, 0.5 mg, Intravenous, Q3H PRN  ondansetron (ZOFRAN-ODT) disintegrating tablet 4 mg, 4 mg, Oral, Q8H PRN **OR** ondansetron (ZOFRAN) injection 4 mg, 4 mg, Intravenous, Q6H PRN  famotidine (PEPCID) tablet 20 mg, 20 mg, Oral, BID PRN  aspirin EC tablet 81 mg, 81 mg, Oral, BID  atorvastatin (LIPITOR) tablet 20 mg, 20 mg, Oral, Nightly Post-operatively the patients diet was advanced as tolerated and their incision was checked on POD #1. The incision is dressing in place, clean, dry and intact with no signs of infection. The patient remained neurovascularly intact in the lower extremity and had intact pulses distally. Patients calf remained soft and showed no evidence of DVT. The patient was able to move their leg and ankle/foot without any problems post-operatively. Physical therapy and occupational therapy were consulted and began working with the patient post-operatively. The patient progressed with PT/OT as would be expected and continued to improve through their stay. The patients pain was initially controlled with IV medications but we were able to transition to oral pain medications soon after arrival to the floor and their pain remained under good control through their hospital stay. From a medical standpoint the patient remained stable and continued to have the medicine team follow throughout their stay. The patients dressing was changed/incision was checked on day of d/c. The patient will be discharged at this time to [address] [place] [address]  with their current diet restrictions and will continue to follow the hip precautions outlined to them by us and PT/OT. Condition on Discharge: Stable    Plan  Return visit in 2 weeks. .  Patient was instructed on the use of pain medications, the signs and symptoms of infection, and was given our number to call should they have any questions or concerns following discharge. For opioid prescriptions given at discharge the following statement is provided for compliance with [employer] rules. Patient being given increased dosage/quantity of opoid pain medication in excess of [employer] guidelines which noted a 30 MED daily of opioids due to the fact that he/she has undergone major orthopaedic surgery as outlined in rule 4731-11-13. Dosages and further duration of the pain medication will be re-evaluated at her post op visit in 2 weeks. Patient was educated on dosing expectations and limits of prescribing as a result of the new [facility] rules enacted August 31, 2017. Please also note that this is not the initial opoid prescription issued to this patient but a continuation of medication utilized during the hospital admission as noted in the medical record. [place]ARRS report has also been utilized to screen for any abuse history or suspicious activity as outlined in [place]. All efforts have been taken to prevent abuse potential and misuse of opioid medications including education, screening, and close clinical follow up.

## 2021-01-13 NOTE — PROGRESS NOTES
Physical Therapy  Facility/Department: [place] [address] - MED SURG/ORTHO  Daily Treatment Note  NAME: Smitha Heath  : 1960  MRN: [id number]    Date of Service: 2021    Discharge Recommendations:  Subacute/Skilled Nursing Facility   PT Equipment Recommendations  Equipment Needed: No  Other: defer to facility    Assessment   Body structures, Functions, Activity limitations: Decreased functional mobility ; Decreased high-level IADLs;Decreased ADL status; Decreased endurance;Decreased ROM; Decreased strength;Decreased balance; Increased pain  Assessment: Pt has progressed well on POD #2 to ambulating with SW up to 200' and performing exs at 20 reps. Pt lives alone and is requesting SNF for D/C. Treatment Diagnosis: decreased mobility s/p R NATASHA  Specific instructions for Next Treatment: progress gait, HEP  Prognosis: Good  Decision Making: Low Complexity  PT Education: Goals;Transfer Training;Disease Specific Education;PT Role;Functional Mobility Training;Plan of Care;General Safety;Weight-bearing Education;Home Exercise Program;Orientation;Precautions;Gait Training  Patient Education: pt educated on use of SW, hand placement with transfers, and hip precautions  Barriers to Learning: none  REQUIRES PT FOLLOW UP: Yes     Patient Diagnosis(es): The encounter diagnosis was Status post total replacement of right hip.     has a past medical history of Acid reflux, Allergic rhinitis, cause unspecified, Arthritis, Attention deficit disorder without mention of hyperactivity, Cough variant asthma, Cough variant asthma, Depression, Edema, HSV-1 infection, Hypercholesteremia, Hyperglycemia, Hypothyroidism, Lumbago, Migraine headache, Osteoarthritis of right hip, Posterior vitreous detachment of left eye, Routine gynecological examination, and Stress incontinence. has a past surgical history that includes Breast lumpectomy; Rotator cuff repair (Right, 2009); Tonsillectomy and adenoidectomy; Foot surgery (Right, 05/2016); Rotator cuff repair (Left, 12/2017); and Total hip arthroplasty (Right, 1/11/2021). Restrictions  Restrictions/Precautions  Restrictions/Precautions: Weight Bearing, General Precautions, Fall Risk, ROM Restrictions  Lower Extremity Weight Bearing Restrictions  Right Lower Extremity Weight Bearing: Weight Bearing As Tolerated  Position Activity Restriction  Hip Precautions: No hip flexion > 90 degrees, No ADduction, No hip internal rotation  Other position/activity restrictions: While in bed and while turning for 24 hours then use regular pillow. 1) Up to bedside evening of surgery 2) Bathroom privileges with assistance  3)  Ambulate in room progressing to hallway with assistive device four times daily (including PT) when PT advises. 4)  Bathroom privileges with assistance. 1)  Up to bedside chair at least three times a day as tolerated. 2)  Ambulate in room progressing to hallway with assistive device four times daily (including PT) when PT advises. 3)  Bathroom privileges with assistance. Subjective   General  Chart Reviewed: Yes  Response To Previous Treatment: Patient with no complaints from previous session.   Family / Caregiver Present: No  Referring Practitioner: Gina Garcia  Subjective  Subjective: Pt agreeable, asks appropriate questions  Pain Screening  Patient Currently in Pain: Yes  Pain Assessment  Pain Type: Surgical pain  Pain Location: Hip  Pain Orientation: Right  Vital Signs  Patient Currently in Pain: Yes          Objective   Bed mobility  Supine to Sit: Modified independent  Transfers  Sit to Stand: Modified independent  Stand to sit: Modified independent  Ambulation  Ambulation?: Yes  WB Status: WBAT RLE  Ambulation 1  Surface: level tile  Device: Standard Walker  Assistance: Modified Independent Quality of Gait: Pt ambulates with decreased sofi and step length height with pt initially sliding RLE and progressing to taking steps with cues for sequencing steps with SW and offloading with BUE for pain  Gait Deviations: Slow Sofi;Decreased step length;Decreased step height  Distance: 200'        Exercises  Quad Sets: X 20 BLE  Heelslides: X 20 BLE  Gluteal Sets: X 20 B  Hip Abduction: X 10 RLE  Knee Short Arc Quad: X 20 BLE  Ankle Pumps: X 20 BLE  Comments: reviewed hip precautions, pt demonstrates and verbalizesgood understanding           AM-PAC Score     AM-PAC Inpatient Mobility without Stair Climbing Raw Score : 17 (01/13/21 0809)  AM-PAC Inpatient without Stair Climbing T-Scale Score : 48.47 (01/13/21 0809)  Mobility Inpatient CMS 0-100% Score: 32.72 (01/13/21 0809)  Mobility Inpatient without Stair CMS G-Code Modifier : CJ (01/13/21 0809)       Goals  Short term goals  Time Frame for Short term goals: 1/15/20  Short term goal 1: Pt will complete bed mobility independently. ; 1/13 mod I  Short term goal 2: Pt will complete transfers mod I with SW.; 1/13 met  Short term goal 3: Pt will ambulate 50 ft with SW mod I.; 1/13 S X 200' with SW mod I (met)  Short term goal 4: Pt will tolerate 12-15 reps of LE exercise for strengthening and endurance by 1/13/20.; 1/12 met  Short term goal 5: Pt will ambulate to bathroom with SW and min A; goal met 1/11 - pt ambulates to bathroom with SW and CGA. ,  Patient Goals   Patient goals : Dufm [place] to the bathroom\"; 1/12 met    Plan    Plan  Times per week: BID x7  Times per day: Twice a day  Plan weeks: 1/15/20  Specific instructions for Next Treatment: progress gait, HEP  Current Treatment Recommendations: Strengthening, Transfer Training, Endurance Training, Patient/Caregiver Education & Training, Balance Training, Gait Training, Home Exercise Program, Functional Mobility Training, Safety Education & Training  Safety Devices Type of devices:  All fall risk precautions in place, Call light within reach, Chair alarm in place, Gait belt, Left in chair, Nurse notified  Restraints  Initially in place: No     Therapy Time   Individual Concurrent Group Co-treatment   Time In 0800         Time Out 0845         Minutes 7 [facility], [address] [place] [address]

## 2021-01-13 NOTE — DISCHARGE INSTR - COC
Continuity of Care Form    Patient Name: Ryland Grider   :  1960  MRN:  [id number]    Admit date:  2021  Discharge date:  21    Code Status Order: Full Code   Advance Directives:   [facility] Documentation       Date/Time Healthcare Directive Type of Healthcare Directive Copy in [address] Agent's Name Healthcare Agent's Phone Number    21 3073  Yes, patient has an advance directive for healthcare treatment    Yes, copy in chart --   --    20 1542  Yes, patient has an advance directive for healthcare treatment  Durable power of  for health care;Living will   --  Dhaval Barahona sister --            Admitting Physician:  Barton Dandy, MD  PCP: Leyda Darby MD    Discharging Nurse:    [address] Unit/Room#: 8395/1179-72  Discharging Unit Phone Number: [phone]    Emergency Contact:   Extended Emergency Contact Information  Primary Emergency Contact: Symform E Globel Direct Phone: [phone]  Relation: Other   needed?  No  Secondary Emergency Contact: Shirley Parikh  Address: [address] [place] [address]  Mobile Phone: [phone]  Relation: Brother/Sister    Past Surgical History:  Past Surgical History:   Procedure Laterality Date    BREAST LUMPECTOMY      FOOT SURGERY Right 2016    fusion of metatarsal    ROTATOR CUFF REPAIR Right     ROTATOR CUFF REPAIR Left 2017    TONSILLECTOMY AND ADENOIDECTOMY      TOTAL HIP ARTHROPLASTY Right 2021    RIGHT TOTAL HIP ARTHROPLASTY POSTERIOR,         RUBI BIOMET performed by Barton Dandy, MD at [facility]       Immunization History:   Immunization History   Administered Date(s) Administered    Influenza 10/01/2015    Influenza A (A7E3-74) Vaccine IM 2009    Influenza A (I3X6-01) Vaccine PF IM 2009    Influenza Vaccine, unspecified formulation 10/30/2016, 10/01/2018  Influenza Virus Vaccine 09/27/2011, 10/15/2012, 10/01/2018, 09/26/2019, 10/05/2020    Influenza, Quadv, IM, PF (6 mo and older Fluzone, Flulaval, Fluarix, and 3 yrs and older Afluria) 11/03/2017    Pneumococcal Polysaccharide ([id number]) 09/28/2017    Td, unspecified formulation 07/01/2005    Tdap (Boostrix, Adacel) 09/22/2015    Zoster Recombinant (Shingrix) 11/11/2020       Active Problems:  Patient Active Problem List   Diagnosis Code    Allergic rhinitis J30.9    Depression F32.9    Edema R60.9    HSV-1 infection B00.9    Hypercholesteremia E78.00    Hypothyroidism E03.9    Lumbago M54.5    Attention deficit disorder F98.8    Cough variant asthma J45.991    Stress incontinence N39.3    Migraine without aura and without status migrainosus, not intractable G43.009    Arthritis of carpometacarpal (CMC) joint of both thumbs M18.0    Hyperglycemia R73.9    Osteoarthritis of right hip M16.11    Status post total replacement of right hip Z96.641       Isolation/Infection:   Isolation            No Isolation          Patient Infection Status       None to display            Nurse Assessment:  Last Vital Signs: /70   Pulse 84   Temp 98.4 °F (36.9 °C) (Oral)   Resp 16   Ht 5' 6\" (1.676 m)   Wt 244 lb (110.7 kg)   LMP 08/26/2019   SpO2 97%   BMI 39.38 kg/m²     Last documented pain score (0-10 scale): Pain Level: 6  Last Weight:   Wt Readings from Last 1 Encounters:   01/11/21 244 lb (110.7 kg)     Mental Status:  oriented, alert, coherent, logical, thought processes intact and able to concentrate and follow conversation    IV Access:  - None    Nursing Mobility/ADLs:  Walking   Assisted  Transfer  Assisted  Bathing  Assisted  Dressing  Assisted  Toileting  Assisted  Feeding  Independent  Med Admin  Assisted  Med Delivery   whole    Wound Care Documentation and Therapy:        Elimination:  Continence:   · Bowel:  Yes  · Bladder: Yes  Urinary Catheter: None Colostomy/Ileostomy/Ileal Conduit:         Date of Last BM: 1/11/21    Intake/Output Summary (Last 24 hours) at 1/13/2021 1254  Last data filed at 1/13/2021 0548  Gross per 24 hour   Intake 540 ml   Output    Net 540 ml     I/O last 3 completed shifts: In: 5 [P.O.:540]  Out: -     Safety Concerns: At Risk for Falls    Impairments/Disabilities:       R total hip 1/11/21    Nutrition Therapy:  Current Nutrition Therapy:   - Oral Diet:  General    Routes of Feeding: Oral  Liquids: No Restrictions  Daily Fluid Restriction: no  Last Modified Barium Swallow with Video (Video Swallowing Test): not done    Treatments at the Time of Hospital Discharge:   Respiratory Treatments:    Oxygen Therapy:  is not on home oxygen therapy.   Ventilator:    - No ventilator support    Rehab Therapies: Physical Therapy and Occupational Therapy  Weight Bearing Status/Restrictions: No weight bearing restirctions  Other Medical Equipment (for information only, NOT a DME order):  walker  Other Treatments:      Patient's personal belongings (please select all that are sent with patient):       RN SIGNATURE:  Electronically signed by Serafin Mckeon RN on 1/13/21 at 2:43 PM EST    CASE MANAGEMENT/SOCIAL WORK SECTION    Inpatient Status Date:      Readmission Risk Assessment Score:  Readmission Risk              Risk of Unplanned Readmission:        6           Discharging to Facility/ [facility]   [address]      / signature: Electronically signed by Natalio Garcia RN on 1/13/21 at 12:54 PM EST    PHYSICIAN SECTION    Prognosis: Good    Condition at Discharge: Stable    Rehab Potential (if transferring to Rehab): Good    Recommended Labs or Other Treatments After Discharge: Physician Certification: I certify the above information and transfer of Marta Eckert  is necessary for the continuing treatment of the diagnosis listed and that she requires Jeferson Cosby for less 30 days. Update Admission H&P: No change in H&P    PHYSICIAN SIGNATURE:  Electronically signed by JAZYMN Castro on 1/13/21 at 1:09 PM EST        Total Hip &Bipolar Replacement  Discharge Instructions    ? To prevent Clot formation, you have been placed on the following medication:  o Aspirin 81 mg twice daily for 30 days   ? Surgical Site Care:  o Dressing change every 5-7 days with mepilex dressing. Can be changed at home until incision healed  o If you have staples or sutures, these will be removed on post-operative day 10-12 and steri-strips applied  o If you have glue, this will be removed in the office or gradually wear off as your incision heals  o Showering is permitted if waterproof mepilex dressing is applied or when staples are removed and all areas of incision are healed. ? Physical Therapy:  o Weight Bearing Status:     Weight bearing as tolerated  o Precautions  ? Per Physical Therapy handout  ? Pain Medications  o You were given oxycodone (Oxycontin, Oxyir)  o Wean off pain medications as you deem appropriate as long as pain is under control  o Be sure to drink plenty of fluids (recommend water) while taking narcotic pain medications to prevent constipation  o You may take an over the counter laxative or stool softener as needed to prevent/treat constipation as well, we recommend Senokot S OTC. We recommend that you consider taking these medications the entire time you are taking pain medication. ? Cold packs/Ice packs/Machine  o May be used as much as necessary to reduce swelling/inflammation/soreness  o Be sure to have a barrier (cloth, clothing, towel) between your skin/incision and the ice pack to prevent frostbite  ?  Contact [facility]'s office if o Increased redness, swelling, drainage of any kind, and/or pain to surgery site. As well as new onset fevers and or chills. These could signify an infection. o Calf or thigh tenderness to touch as well as increased swelling or redness. This could signify a clot formation. o Numbness or tingling to an area around the incision site or below the incision site (toes). o Any rash appears, increased  or new onset nausea/vomiting occur. This may indicate a reaction to a medication. ? Phone # [phone] [address]  ? Follow up with Dr. Soumya Corrigan at scheduled appointment time  ? I acknowledge that I have received malinda hose and understand the instructions on how and when to wear them   __________________________________  ? Discharging RN who has gone over instructions and acknowledges malinda hose have been received   ____________________________________________  ? Please remove Blue Exparel wrist band on Post Operative Day #4  ? Take celebrex for anti-inflammatory as prescribed  ? Please continue to use your Incentive Spirometer at home every hour while awake.

## 2021-01-13 NOTE — PROGRESS NOTES
Hospitalist Progress Note      PCP: Praneeth Andersen MD    Date of Admission: 1/11/2021    Chief Complaint: Right hip pain    Hospital Course: S/p R NATASHA d/t OA on 1/11. Subjective: Pt is on RA. Afebrile. VSS. No complaints currently. Medications:  Reviewed    Infusion Medications     Scheduled Medications    furosemide  40 mg Oral Daily    celecoxib  100 mg Oral BID    gabapentin  300 mg Oral TID    levothyroxine  50 mcg Oral Daily    montelukast  10 mg Oral Daily    potassium chloride  10 mEq Oral BID    topiramate  100 mg Oral BID    sodium chloride flush  10 mL Intravenous 2 times per day    acetaminophen  650 mg Oral Q6H    sennosides-docusate sodium  1 tablet Oral BID    aspirin  81 mg Oral BID    atorvastatin  20 mg Oral Nightly     PRN Meds: SUMAtriptan, ketorolac, sodium chloride flush, magnesium hydroxide, oxyCODONE **OR** oxyCODONE, HYDROmorphone **OR** HYDROmorphone, ondansetron **OR** ondansetron, famotidine      Intake/Output Summary (Last 24 hours) at 1/13/2021 1548  Last data filed at 1/13/2021 0548  Gross per 24 hour   Intake 540 ml   Output    Net 540 ml       Physical Exam Performed:    /70   Pulse 84   Temp 98.4 °F (36.9 °C) (Oral)   Resp 16   Ht 5' 6\" (1.676 m)   Wt 244 lb (110.7 kg)   LMP 08/26/2019   SpO2 97%   BMI 39.38 kg/m²     General appearance: No apparent distress, appears stated age and cooperative. HEENT: Pupils equal, round, and reactive to light. Conjunctivae/corneas clear. Neck: Supple, with full range of motion. No jugular venous distention. Trachea midline. Respiratory:  Normal respiratory effort. Clear to auscultation, bilaterally without Rales/Wheezes/Rhonchi. Cardiovascular: Regular rate and rhythm with normal S1/S2 without murmurs, rubs or gallops. Abdomen: Soft, non-tender, non-distended with normal bowel sounds. Musculoskeletal: No clubbing, cyanosis or edema bilaterally. Right hip dressing C/D/I. Skin: Skin color, texture, turgor normal.  No rashes or lesions. Neurologic:  Neurovascularly intact without any focal sensory/motor deficits. Cranial nerves: II-XII intact, grossly non-focal.  Psychiatric: Alert and oriented, thought content appropriate, normal insight  Capillary Refill: Brisk,< 3 seconds   Peripheral Pulses: +2 palpable, equal bilaterally       Labs:   Recent Labs     01/12/21  0628 01/13/21  0649   WBC 14.9* 10.7   HGB 11.3* 11.6*   HCT 34.6* 35.3*    173     Recent Labs     01/12/21  0628 01/13/21  0649    141   K 4.3 3.8    107   CO2 26 26   BUN 13 17   CREATININE 0.7 0.6   CALCIUM 9.3 9.6     Urinalysis:      Lab Results   Component Value Date    NITRU Negative 12/28/2020    BACTERIA SEE NOTES 02/19/2015    RBCUA 3+ 02/19/2015    BLOODU Negative 12/28/2020    SPECGRAV 1.020 12/28/2020    GLUCOSEU Negative 12/28/2020       Radiology:  XR PELVIS (1-2 VIEWS)   Final Result   Postoperative appearance as above. Assessment/Plan:    Active Hospital Problems    Diagnosis    Status post total replacement of right hip [Z96.641]    Osteoarthritis of right hip [M16.11]    Migraine without aura and without status migrainosus, not intractable [G43.009]    Depression [F32.9]    Hypothyroidism [E03.9]    Hypercholesteremia [E78.00]       Status post right NATASHA due to OA on 1/11/21:  - Management per Orthopedic surgery. - Continue prn analgesia, bowel regimen, IS, DVT prophylaxis and PT/OT    Hypothyroidism:  - Clinically euthyroid. Continue her home dose of levothyroxine.     Hyperlipidemia:  - Continue statin.     History of migraines:  - ContinueTopamax and Imitrex as needed.     Asthma:  - Stable. Albuterol inhaler as needed, continue Singulair.     Obesity:

## 2021-01-13 NOTE — PROGRESS NOTES
Report called to Pato at [facility] at this time. Pt to facility at 1800. Yes

## 2021-01-13 NOTE — CONSULTS
Nutrition Note    RD received consult for BMI over 35. Pt seen by RD on 1/12. Oral nutrition supplements offered and nutrition goals addressed. Dietitians following with nutrition assessment and recommendations in RD progress notes. Will continue to monitor per nutrition standards of care.      Elliott Tilley MS, RD, LD on 1/13/2021 at 9:16 AM  Office: [phone]

## 2021-01-14 ENCOUNTER — TELEPHONE (OUTPATIENT)
Dept: ORTHOPEDIC SURGERY | Age: 61
End: 2021-01-14

## 2021-01-14 DIAGNOSIS — M24.551 CONTRACTURE OF RIGHT HIP: ICD-10-CM

## 2021-01-14 DIAGNOSIS — M16.11 PRIMARY OSTEOARTHRITIS OF RIGHT HIP: Primary | ICD-10-CM

## 2021-01-14 DIAGNOSIS — Z96.641 STATUS POST TOTAL REPLACEMENT OF RIGHT HIP: ICD-10-CM

## 2021-01-14 DIAGNOSIS — M76.891 HIP ABDUCTOR TENDINITIS, RIGHT: ICD-10-CM

## 2021-01-14 DIAGNOSIS — M16.11 PRIMARY OSTEOARTHRITIS OF RIGHT HIP: ICD-10-CM

## 2021-01-14 DIAGNOSIS — Z96.641 STATUS POST TOTAL REPLACEMENT OF RIGHT HIP: Primary | ICD-10-CM

## 2021-01-14 RX ORDER — OXYCODONE HYDROCHLORIDE 5 MG/1
5 TABLET ORAL EVERY 6 HOURS PRN
Qty: 28 TABLET | Refills: 0 | Status: SHIPPED | OUTPATIENT
Start: 2021-01-14 | End: 2021-01-15 | Stop reason: SDUPTHER

## 2021-01-14 RX ORDER — ONDANSETRON 4 MG/1
4 TABLET, FILM COATED ORAL EVERY 8 HOURS PRN
Qty: 30 TABLET | Refills: 1 | Status: SHIPPED | OUTPATIENT
Start: 2021-01-14 | End: 2021-05-11

## 2021-01-14 RX ORDER — ASPIRIN 81 MG/1
81 TABLET, CHEWABLE ORAL 2 TIMES DAILY
Qty: 60 TABLET | Refills: 0 | Status: SHIPPED | OUTPATIENT
Start: 2021-01-14

## 2021-01-14 NOTE — TELEPHONE ENCOUNTER
Both Dr aJson Ibarra and I attempted to call the number below and it's a non working number. If Diaz Flaming calls again please get a working number.   DP

## 2021-01-14 NOTE — TELEPHONE ENCOUNTER
General Question     Subject: MEDICATION   Patient : Requesting medication she left the rehab facility before they could get her medication dispensed and needs a new script. Patient did not advise pharmacy.   Contact Number: [phone]

## 2021-01-15 ENCOUNTER — TELEPHONE (OUTPATIENT)
Dept: ORTHOPEDIC SURGERY | Age: 61
End: 2021-01-15

## 2021-01-15 DIAGNOSIS — Z96.641 STATUS POST TOTAL REPLACEMENT OF RIGHT HIP: ICD-10-CM

## 2021-01-15 DIAGNOSIS — M16.11 PRIMARY OSTEOARTHRITIS OF RIGHT HIP: ICD-10-CM

## 2021-01-15 DIAGNOSIS — Z96.641 STATUS POST TOTAL REPLACEMENT OF RIGHT HIP: Primary | ICD-10-CM

## 2021-01-15 RX ORDER — OXYCODONE HYDROCHLORIDE 5 MG/1
5 TABLET ORAL EVERY 6 HOURS PRN
Qty: 28 TABLET | Refills: 0 | Status: CANCELLED | OUTPATIENT
Start: 2021-01-15 | End: 2021-01-22

## 2021-01-15 RX ORDER — OXYCODONE HYDROCHLORIDE 5 MG/1
5 TABLET ORAL EVERY 6 HOURS PRN
Qty: 28 TABLET | Refills: 0 | Status: SHIPPED | OUTPATIENT
Start: 2021-01-15 | End: 2021-01-22

## 2021-01-18 NOTE — DISCHARGE SUMMARY
Surgical []  Conservative [x] (Pre-op)      Other significant findings at last visit:  6 minute walk test: 1402 ft  30 sec STS: 9 attempts completed  TU.75 sec  Standing balance:               Romberg: 10 sec              Modified tandem: 10 sec              Tandem stance: 10 sec              SLS on R LE: 10 sec    Pt presents to PT clinic this date with c/o R hip pain.  Pt is scheduled for R NATASHA on 2021, and is at outpatient PT for prehab.  Pt demonstrating significant improvement this date with 6 minute walk test, TUG, and 30 sec STS testing. Pt also demonstrating improved balance on R LE. Pt demonstrates understanding of all LE therapeutic exercises. Educated pt to continue and progress with stretching of L hip and knee extension for improved outcomes. Pt continues to have significant limitations in L LE strength and hip ROM. At this time, d/c outpatient PT services and continue with HEP for preparation for upcoming NATASHA 21. Progress towards goals:    Short term goals  Time Frame for Short term goals: In 2 weeks, pt will  Short term goal 1: Demonstrate improved R hip flexion strength to 3/5 not met (3-/5)  Short term goal 2: Demonstrate improved R hip flexion PROM to 100 degrees not met (90)  Short term goal 3: Demonstrate improved R hip abd PROM to 30 degrees met (30)  Short term goal 4: Demonstrate improved R hip abd strength to 3/5 not met (2+/5)  Short term goal 5: Improve LEFS score to >35/80 met 38/80  Long term goals  Time Frame for Long term goals :  In 4 weeks, pt will  Long term goal 1: Demonstrate improved R hip flexion strength to 4/5  not met (3-/5)  Long term goal 2: Demonstrate improved R hip flexion PROM to 110 degrees not met (90)  Long term goal 3: Demonstrate improved R hip abd PROM to 35 degrees not met (30)  Long term goal 4: Demonstrate improved R hip abd strength to 4/5 not met (2+/5)  Long term goal 5: Improve LEFS score to >45/80 not met Goal Status:  [] Achieved [x] Partially Achieved  [] Not Achieved     Patient Status: [x] Patient now discharged      Electronically signed by:  Trey Arita PT

## 2021-01-20 ENCOUNTER — TELEPHONE (OUTPATIENT)
Dept: ORTHOPEDIC SURGERY | Age: 61
End: 2021-01-20

## 2021-01-20 NOTE — TELEPHONE ENCOUNTER
Spoke with pt, doing well. Incision status: No drainage or redness    Edema/Swelling/Teds: Having some swelling. Pain level and status: Managing welll    Use of pain medications: Using as needed. Blood thinner: ASA 81 mg with no issues. Bowels: Moving fine. Home Care Agency active: C was arranged by Radha Sage with the office for [facility]. Pt states she has not heard from [facility]. RN called [facility] for update. [facility] not in network for pt. RN discussing with Radha Sage from office. Pt wanting to go with OP PT at this time.      Outpatient therapy: NA    Do you have all of your medications: Yes    Changes in medications: no    Ortho Vitals: NA    Follow up appointments:    Future Appointments   Date Time Provider Brianna Emely   1/29/2021  1:00 PM Miracle Esteves MD [facility]

## 2021-01-20 NOTE — TELEPHONE ENCOUNTER
Spoke with pt and clarified that there is no need to have home PT as she doesn't start full rehab PT until after her 1st post-op appointment with us (1/29). Pt will keep order for her REHAB PT with Jamil.     NAM

## 2021-01-29 ENCOUNTER — OFFICE VISIT (OUTPATIENT)
Dept: ORTHOPEDIC SURGERY | Age: 61
End: 2021-01-29

## 2021-01-29 ENCOUNTER — HOSPITAL ENCOUNTER (OUTPATIENT)
Dept: PHYSICAL THERAPY | Age: 61
Setting detail: THERAPIES SERIES
Discharge: HOME | End: 2021-01-29
Payer: COMMERCIAL

## 2021-01-29 DIAGNOSIS — Z96.641 STATUS POST TOTAL REPLACEMENT OF RIGHT HIP: Primary | ICD-10-CM

## 2021-01-29 PROCEDURE — 97116 GAIT TRAINING THERAPY: CPT

## 2021-01-29 PROCEDURE — 99024 POSTOP FOLLOW-UP VISIT: CPT | Performed by: ORTHOPAEDIC SURGERY

## 2021-01-29 PROCEDURE — 97110 THERAPEUTIC EXERCISES: CPT

## 2021-01-29 PROCEDURE — 97161 PT EVAL LOW COMPLEX 20 MIN: CPT

## 2021-01-29 NOTE — PROGRESS NOTES
Social/Functional History  Lives With: Alone  Type of Home: House  Home Layout: One level  Home Access: Level entry  Bathroom Shower/Tub: Walk-in shower  Bathroom Toilet: Handicap height  Bathroom Equipment: Grab bars in shower;Grab bars around toilet  Home Equipment: Rolling walker;Cane;Reacher;Sock aid;Long-handled shoehorn  ADL Assistance: Independent  Homemaking Assistance: Independent  Ambulation Assistance: Independent  Transfer Assistance: Independent  Active : No  Patient's  Info: friend  Occupation: Retired  Type of occupation: nurse  Leisure & Hobbies: travel  Additional Comments: friends and neighbors assist now with cat litter and cat feeding, taking out trash since surgery    Objective     Observation/Palpation  Observation: Pt ambulates with 2WW with gait speed: 2.33 ft/sec with reciprocal pattern noted, mild antalgia. Post-op dressing R lateral hip intact without signs of infection or drainage. Pt seeing  later today and to discuss with him.     PROM RLE (degrees)  RLE General PROM: hip flexion 80 deg with groin pain  AROM RLE (degrees)  RLE General AROM: knee 0-110 deg, hip abd 20 deg  PROM LLE (degrees)  LLE General PROM: hip flexion ~ 100 deg  AROM LLE (degrees)  LLE General AROM: knee 0-110 deg, hip abd 30 deg    Strength Other  Other: B ankle DF 5/5, B knee flex 5/5, L knee ext 5/5, R knee ext 4/5, R hip abd 2/5, L hip abd at least 2/5 but unable to test against gravity as pt unable lay on R side due to pain, hip flexion not tested due to time     Additional Measures  Special Tests: TUG = 13.14 seconds with 2WW, 30 second sit to stand test = 0 reps (needs UE to A), 6 min walk test with 2WW = 905 ft = 276 meters  Other: LEFS = 35/80  Balance  Comments: deferred SLS (pt still using 2WW), deferred feet together, modified tandem and tandem due to posterior hip precautions    Assessment   Conditions Requiring Skilled Therapeutic Intervention  Body structures, Functions, Activity limitations: Decreased functional mobility ; Decreased ROM; Decreased strength; Increased pain  Assessment: Pt presents s/p R NATASHA 01/11/2021 with pain, decreased ROM/strength limiting N gait, WB tolerance, stairs, household chores and social activities, driving. Pt would benefit from skilled physical therapy services to address listed deficits to return to [facility].   Treatment Diagnosis: R hip pain s/p R NATASHA  Prognosis: Good  Decision Making: Low Complexity  REQUIRES PT FOLLOW UP: Yes  Activity Tolerance  Activity Tolerance: Patient Tolerated treatment well         Plan   Plan  Times per week: 2  Plan weeks: 6  Plan Comment: Plan of care initiated    Goals  Short term goals  Time Frame for Short term goals: 3 weeks  Short term goal 1: Pt will demo good understanding and knowledge of initial HEP  Short term goal 2: PROM R hip flexion 90 deg and AROM R hip abd 30 deg to allow for improving gait pattern  Short term goal 3: Strength R hip abd 3/5 to allow for improved WB tolerance  Long term goals  Time Frame for Long term goals : 6 weeks  Long term goal 1: Pt will demo good understanding and knowledge of HEP progressions  Long term goal 2: Pain 1/10 at worst to allow for WB tolerance as desired  Long term goal 3: AROM R hip WFLs within restrictions to allow for N gait pattern without AD with gait speed: 2.99 ft/sec without increased pain  Long term goal 4: Strength R hip 5/5 and R knee ext 5/5 to allow for pt able to ascend/descend stairs reciprocally and allow entry into camper van without increased pain  Long term goal 5: Decreased impairment per LEFS >/= 60/80  Patient Goals   Patient goals : \"stairs into anywhere, camper van, walk without pain\"       Therapy Time   Individual Concurrent Group Co-treatment   Time In 1106         Time Out 1200         Minutes 54         Timed Code Treatment Minutes: 1200 [facility], [address] [facility], DPT [id number]

## 2021-01-29 NOTE — PROGRESS NOTES
Dr Geronimo Ewing      Date /Time 1/29/2021       5:16 PM EST  Name Robbert Osler             1960   Location  [address]  MRN <[id number]>                Chief Complaint   Patient presents with    Post-Op Check        History of Present Illness      Robbert Osler is a 61 y.o. female is here for post-op visit after RIGHT  [address]69 Total Hip Arthroplasty, posterior approach    She is doing quite well, starting to wean off of her walker. She only stayed in the nursing facility for 1 day and feels like she made a mistake by going there. She done really well with her hip and following her precautions nicely. She has almost no pain. Physical Exam    Based off 1997 Exam Criteria    LMP 08/26/2019      Constitutional:       General: He is not in acute distress. Appearance: Normal appearance. RIGHT Hip: incision clean, intact, healing appropriately. No surrounding  erythema or fluctuance. Neuro intact distal. No evidence of DVT. Imaging       Right Hip: [facility] [place]  Radiographs: DM right hip prosthesis, stable condition from immediate x-rays. No evidence of fractures or dislocations. Leg length and offset restored. Assessment and Plan    Cyndi Stephens was seen today for post-op check. Diagnoses and all orders for this visit:    Status post total replacement of right hip    Other orders  -     XR HIP RIGHT (2-3 VIEWS); Future        Weight-bear as tolerated right lower extremity  4 more weeks of posterior hip precautions  Can shower now, no submerging in water for 2 additional weeks  Work with gait training and fall prevention for right hip and PT  Return in 3 months      Electronically signed by Geronimo Ewing MD on 1/29/2021 at 5:19 PM  This dictation was generated by voice recognition computer software. Although all attempts are made to edit the dictation for accuracy, there may be errors in the transcription that are not intended.

## 2021-01-29 NOTE — PROGRESS NOTES
The Lower Extremity Functional Scale    Patient: Elisabeth Gannon  : 1960  MRN: [id number]  Date: 2021  Electronically Signed by: Young Torres PT, DPT [id number]     We are interested in knowing whether you are having any difficulty at all with the activities listed below because of your lower limb problem for which you are currently seeking attention. Please provide an answer for each activity.          Today would you have difficulty at all with:    Activities Extreme Difficulty or Unable to Perform Activity Quite a Bit of Difficulty Moderate Difficulty A Little Bit of Difficulty No Difficulty   1 Any of your usual work, housework, or school activities []0 []1 []2  [x]3 []4   2 Your usual hobbies, recreational, or sporting activities []0 []1 []2 [x]3 []4   3 Getting into or out of the bath []0 []1 []2 [x]3 []4   4 Walking between rooms []0 []1 []2 [x]3 []4   5 Putting on your shoes or socks []0 []1 [x]2 []3 []4   6 Squatting []0 [x]1 []2 []3 []4   7 Lifting an object, like a bag of groceries from the floor []0 []1 []2 [x]3 []4   8 Performing light activities around your home []0 []1 []2 [x]3 []4   9 Performing heavy activities around your home []0 []1 [x]2 []3 []4   10 Getting into or out of a car []0 []1 []2 [x]3 []4   11 Walking 2 blocks []0 [x]1 []2 []3 []4   12 Walking a mile [x]0 []1 []2 []3 []4   13 Going up or down 10 stairs (about 1 flight of stairs) [x]0 []1 []2 []3 []4   14 Standing for 1 hour []0 []1 [x]2 []3 []4   15 Sitting for 1 hour []0 []1 []2 []3 [x]4   16 Running on even ground  [x]0 []1 []2 []3 []4   17 Running on uneven ground  [x]0 []1 []2 []3 []4   18 Making sharp turns while running fast  [x]0 []1 []2 []3 []4   19 Hopping [x]0 []1 []2 []3 []4   20 Rolling over in bed []0 []1 [x]2 []3 []4    Column Totals:          Patient Score from Above: 35/80      (Patient Score / 80) X 100:  44%                          Scoring Method for Lower Extremity Functional Scale    The Lower Extremity Functional Scale (LEFS) is an easily administered and scored functional outcome tool. It can be utilized for lower extremity conditions and is sensitive enough for a wide range of functional disability levels. It can and should be used on the initial visit and subsequently on a 2- 3 week basis to measure patient's progress. The tool has a sufficient measure of reliability, variability, and sensitivity to change for determining minimally clinically important score differences, on a test to re-test basis. Scoring:   LEFS Score = (Sum of Responses / 80) X 100    Error + / - 5 points, Minimum Level of Detectable Change (90% Confidence): 9 Points     EDVIN Newell, AVINASH Up, OSVALDO Sanchez, & The [address] [place] [address], The Lower Extremity Functional Scale: Scale development, measurement properties, and clinical application, Physical Therapy, 1999, 79, [id number], with permission of the American Physical Therapy Association.       G-Code Crosswalk:  LEFS Total Score Disability Index CMS Modifier   80 0% []CH   79-64 1-19% []CI   63-48 20-39% []CJ   47-32 40-59% []CK   31-16 60-79% []CL   15-1 80-99% []CM   0 100% []CN

## 2021-01-29 NOTE — FLOWSHEET NOTE
pattern noted, mild antalgia. Post-op dressing R lateral hip intact without signs of infection or drainage. Pt seeing  later today and to discuss with him.  Test measurements:    PROM RLE (degrees)  RLE General PROM: hip flexion 80 deg with groin pain  AROM RLE (degrees)  RLE General AROM: knee 0-110 deg, hip abd 20 deg  PROM LLE (degrees)  LLE General PROM: hip flexion ~ 100 deg  AROM LLE (degrees)  LLE General AROM: knee 0-110 deg, hip abd 30 deg     Strength Other  Other: B ankle DF 5/5, B knee flex 5/5, L knee ext 5/5, R knee ext 4/5, R hip abd 2/5, L hip abd at least 2/5 but unable to test against gravity as pt unable lay on R side due to pain, hip flexion not tested due to time  Additional Measures  Special Tests: TUG = 13.14 seconds with 2WW, 30 second sit to stand test = 0 reps (needs UE to A), 6 min walk test with 2WW = 905 ft = 276 meters  Other: LEFS = 35/80  Balance  Comments: deferred SLS (pt still using 2WW), deferred feet together, modified tandem and tandem due to posterior hip precautions     Exercises, Neuro Facilitation & Gait Training (32816, [id number], [id number]): posterior hip precautions  Activity Resistance/Repetitions Other comments   Nustep (watch no hip flexion > 90 deg)     Standing marches (no > 90 deg hip flexion)     Standing hip ext/abd                                                              Therapeutic Activities (72152):  NA    Home Exercise Program:   Pt. demonstrated good understanding and knowledge of HEP. Written instructions provided. 01/29/2021: continue heelslides, supine hip abd, quad sets, LAQ, SAQ as previously instructed, no additional due to time constraints    Gait Train: Raised walker 1 notch with improved tolerance noted by pt and less forward lean, plan to progress to cane when appropriate    Manual Treatments (84734):   Add PROM R hip flexion to 90 deg, abd prn    Modalities: NA     Timed Code Treatment Minutes:  TE: 10, Gait: 15    Total Treatment Minutes: 54 (+eval low)    Treatment/Activity Tolerance:  [x] Patient tolerated treatment well [] Patient limited by fatigue  [] Patient limited by pain  [] Patient limited by other medical complications  [] Other:     Assessment: Pt presents s/p R ANTASHA 01/11/2021 with pain, decreased ROM/strength limiting N gait, WB tolerance, stairs, household chores and social activities, driving. Pt would benefit from skilled physical therapy services to address listed deficits to return to [facility]. Prognosis: [x] Good [] Fair  [] Poor    Patient Requires Follow-up: [x] Yes  [] No    Goals:  Short term goals  Time Frame for Short term goals: 3 weeks  Short term goal 1: Pt will demo good understanding and knowledge of initial HEP  Short term goal 2: PROM R hip flexion 90 deg and AROM R hip abd 30 deg to allow for improving gait pattern  Short term goal 3: Strength R hip abd 3/5 to allow for improved WB tolerance  Long term goals  Time Frame for Long term goals : 6 weeks  Long term goal 1: Pt will demo good understanding and knowledge of HEP progressions  Long term goal 2: Pain 1/10 at worst to allow for WB tolerance as desired  Long term goal 3: AROM R hip WFLs within restrictions to allow for N gait pattern without AD with gait speed: 2.99 ft/sec without increased pain  Long term goal 4: Strength R hip 5/5 and R knee ext 5/5 to allow for pt able to ascend/descend stairs reciprocally and allow entry into camper van without increased pain  Long term goal 5: Decreased impairment per LEFS >/= 60/80    Plan:   Visits per week: 2   # of weeks: 6     [] Continue per plan of care [] Alter current plan (see comments)  [x] Plan of care initiated [] Hold pending MD visit [] Discharge    Plan for Next Session:  Add exercises as tolerated    Electronically signed by:   Ciro Pickens DPT [id number]

## 2021-01-29 NOTE — PLAN OF CARE
Outpatient Physical Therapy  Phone: [phone] Fax: [phone]    To: Referring Practitioner: Joseph Gonzalez MD  From: Dunia Michaels, PT, DPT [id number]   Date: 2021  Patient: Delores Carranza     : 1960 MRN: [id number]  Diagnosis: Diagnosis: Primary osteoarthritis of right hip (M16.11), Contracture of right hip (M24.551), Hip abductor tendinitis, right (G92.955 )   Treatment Diagnosis: Treatment Diagnosis: R hip pain s/p R NATASHA     Physical Therapy Certification/Re-Certification Form  Dear Dr. Boo Ramirez,   The following patient has been evaluated for physical therapy services and for therapy to continue, Medicare requires monthly physician review of the treatment plan. Please review the attached evaluation and/or summary of the patient's plan of care, and verify that you agree therapy should continue by signing the attached document and sending it back to our office.     Plan of Care/Treatment to date:  [x] Therapeutic Exercise (Review/Progress HEP and provide verbal/tactile cueing for activities related to strengthening, flexibility,  endurance, ROM.)       [x] Therapeutic Activity (Provide verbal/tactile cueing for dynamic activities to promote functional tasks.)          [x] Gait Training (Provide verbal/tactile/visual cueing for facilitation of normalized gait pattern without or with the least restrictive AD to decrease pain and/or risk for falling.)          [x] Neuromuscular Re-education (Review/Progress HEP and provide verbal/tactile cueing for activities related to improving balance, coordination, kinesthetic sense, posture, motor skill, proprioception.)         [x] Manual Therapy (Provide manual therapy to mobilize soft tissue/joints for the purpose of modulating pain, promoting relaxation, increasing ROM, reducing/eliminating soft tissue swelling/inflammation/tightness, improving soft tissue extensibility)   [] Dry Needling    [] Aquatic Therapy (Facilitate muscle relaxation and increases peripheral circulation; stimulates body awareness, balance, and trunk stability.)                  [x] Modalities (For modulating pain/tenderness/paresthesias, reducing swelling/inflammation/tightness, improving soft tissue extensibility, and/or to increase muscle tone/strength):     [] Ultrasound  [] Electrical Stimulation        [] Cervical Traction [] Lumbar Traction       [] Cold/hotpack [] Iontophoresis   Other:      []          []      Assessment:  Conditions Requiring Skilled Therapeutic Intervention  Body structures, Functions, Activity limitations: Decreased functional mobility , Decreased ROM, Decreased strength, Increased pain  Assessment: Pt presents s/p R NATASHA 01/11/2021 with pain, decreased ROM/strength limiting N gait, WB tolerance, stairs, household chores and social activities, driving. Pt would benefit from skilled physical therapy services to address listed deficits to return to OF.   Treatment Diagnosis: R hip pain s/p R NATASHA  Prognosis: Good  Decision Making: Low Complexity  REQUIRES PT FOLLOW UP: Yes    Goals:  Short term goals  Time Frame for Short term goals: 3 weeks  Short term goal 1: Pt will demo good understanding and knowledge of initial HEP  Short term goal 2: PROM R hip flexion 90 deg and AROM R hip abd 30 deg to allow for improving gait pattern  Short term goal 3: Strength R hip abd 3/5 to allow for improved WB tolerance  Long term goals  Time Frame for Long term goals : 6 weeks  Long term goal 1: Pt will demo good understanding and knowledge of HEP progressions  Long term goal 2: Pain 1/10 at worst to allow for WB tolerance as desired  Long term goal 3: AROM R hip WFLs within restrictions to allow for N gait pattern without AD with gait speed: 2.99 ft/sec without increased pain  Long term goal 4: Strength R hip 5/5 and R knee ext 5/5 to allow for pt able to ascend/descend stairs reciprocally and allow entry into [employer] van without increased pain  Long term goal 5: Decreased impairment per LEFS >/= 60/80    Frequency/Duration:  # Days per week: [] 1 day # Weeks: [] 1 week [] 5 weeks     [x] 2 days   [] 2 weeks [x] 6 weeks     [] 3 days   [] 3 weeks [] 7 weeks     [] 4 days   [] 4 weeks [] 8 weeks    Rehab Potential: [] Excellent [x] Good [] Fair  [] Poor       Electronically signed by: Dylan Hammonds, PT, DPT [id number]        If you have any questions or concerns, please don't hesitate to call.   Thank you for your referral.      Physician Signature:________________________________Date:__________________  By signing above, therapists plan is approved by physician

## 2021-02-01 ENCOUNTER — PATIENT MESSAGE (OUTPATIENT)
Dept: FAMILY MEDICINE CLINIC | Age: 61
End: 2021-02-01

## 2021-02-01 DIAGNOSIS — G43.009 MIGRAINE WITHOUT AURA AND WITHOUT STATUS MIGRAINOSUS, NOT INTRACTABLE: ICD-10-CM

## 2021-02-01 RX ORDER — SUMATRIPTAN 100 MG/1
TABLET, FILM COATED ORAL
Qty: 27 TABLET | Refills: 3 | Status: SHIPPED | OUTPATIENT
Start: 2021-02-01 | End: 2021-08-31 | Stop reason: SDUPTHER

## 2021-02-01 NOTE — TELEPHONE ENCOUNTER
From: Santino Sawant  To: Olga Sim MD  Sent: 2/1/2021 10:31 AM EST  Subject: Non-Urgent Medical Question    i thought i sent this message already, but I'm not seeing it. I just need my Imitrex/Sumatriptan called in to the [facility] in [place]. Nearly out now, so thanks in advance.

## 2021-02-03 ENCOUNTER — HOSPITAL ENCOUNTER (OUTPATIENT)
Dept: PHYSICAL THERAPY | Age: 61
Setting detail: THERAPIES SERIES
Discharge: HOME | End: 2021-02-03
Payer: COMMERCIAL

## 2021-02-03 NOTE — CARE COORDINATION
Physical Therapy  Cancellation/No-show Note  Patient Name:  Arch Hatchet  :  1960   Date:  2/3/2021  MRN: [id number]  Cancelled visits to date: 1  No-shows to date: 0    For today's appointment patient:  [x]  Cancelled  []  Rescheduled appointment  []  No-show     Reason given by patient:  []  Patient ill  []  Conflicting appointment  [x]  No transportation    []  Conflict with work  []  No reason given  []  Other:     Comments:      Electronically signed by:  Cole Ramirez PT

## 2021-02-05 ENCOUNTER — HOSPITAL ENCOUNTER (OUTPATIENT)
Dept: PHYSICAL THERAPY | Age: 61
Setting detail: THERAPIES SERIES
Discharge: HOME | End: 2021-02-05
Payer: COMMERCIAL

## 2021-02-05 ENCOUNTER — APPOINTMENT (OUTPATIENT)
Dept: PHYSICAL THERAPY | Age: 61
End: 2021-02-05
Payer: COMMERCIAL

## 2021-02-05 PROCEDURE — 97110 THERAPEUTIC EXERCISES: CPT

## 2021-02-05 NOTE — FLOWSHEET NOTE
Physical Therapy Daily Treatment Note  Date:  2021    Patient Name:  Robbert Osler    :  1960  MRN: [id number]  Restrictions/Precautions:   posterior hip precautions R, hypothyroid, migraines, LBP, OA, controlled depression, cough variant asthma, Flexeril allergy, HWR R foot, Hx B RCR   Medical/Treatment Diagnosis Information:  · Diagnosis: Primary osteoarthritis of right hip (M16.11), Contracture of right hip (M24.551), Hip abductor tendinitis, right (U92.975 )  · Treatment Diagnosis: R hip pain s/p R NATASHA  Insurance/Certification information:  PT Insurance Information: [employer], 20 visits per erna year (then call for PA if need additional)   Physician Information:  Referring Practitioner: Geronimo Ewing MD MD Follow-up Visit: 2021 after she today  Plan of care signed (Y/N):  Sent to  via [facility]  Visit# / total visits:    Pain level: 210     Progress Note Due (10 visits or 30 days, whichever is less):    Recertification Note Due (End of POC or 90 days, whichever is less):      Subjective: Pt reports improvement with minimal pain and increased mobility. Pt reports she has advanced herself to use of SPC from . Objective:2021  ? Observation:    Pt ambulates with SPC this date. Pt with step through gait, mildly antalgic, but safe with no LOB. ?  Test measurements:    PROM RLE (degrees)  RLE General PROM: hip flexion 80 deg with groin pain  AROM RLE (degrees)  RLE General AROM: knee 0-110 deg, hip abd 20 deg  PROM LLE (degrees)  LLE General PROM: hip flexion ~ 100 deg  AROM LLE (degrees)  LLE General AROM: knee 0-110 deg, hip abd 30 deg     Strength Other  Other: B ankle DF 5/5, B knee flex 5/5, L knee ext 5/5, R knee ext 4/5, R hip abd 2/5, L hip abd at least 2/5 but unable to test against gravity as pt unable lay on R side due to pain, hip flexion not tested due to time  Additional Measures Special Tests: TUG = 13.14 seconds with 2WW, 30 second sit to stand test = 0 reps (needs UE to A), 6 min walk test with 2WW = 905 ft = 276 meters  Other: LEFS = 35/80  Balance      Exercises, Neuro Facilitation & Gait Training (84362, [id number], [id number]): posterior hip precautions  Activity Resistance/Repetitions Other comments   Nustep (watch no hip flexion > 90 deg) 10 min L2    Standing marches (no > 90 deg hip flexion) 15x    Standing hip abd 10x B S/o slight pain with L hip abd when stabilizing on R LE   Glut squeeze 15 x 5\"    Heel slides 20x    Supine abd 15x    Mini squats 15x                                         Therapeutic Activities (45909):  NA    Home Exercise Program:   Pt. demonstrated good understanding and knowledge of HEP. Written instructions provided. 01/29/2021: continue heelslides, supine hip abd, quad sets, LAQ, SAQ as previously instructed, no additional due to time constraints    Access Code: [email or web address]   URL: [email or web address]   Date: 02/05/2021   Prepared by: Taina Caicedo     Exercises   Standing March with Counter Support - 10 reps - 1 sets - 2x daily - 7x weekly   Standing Partial Squat - 10 reps - 1 sets - 2x daily - 7x weekly   Standing Hip Abduction with Counter Support - 10 reps - 1 sets - 2x daily - 7x weekly     Gait Train: 30' x 2 ambulation with SPC, demonstrating appropriate technique and sequencing.   Checked SPC height    Manual Treatments (74919):      Modalities: NA     Timed Code Treatment Minutes:  TE 40 minutes, Gait 5 min    Total Treatment Minutes:  45    Treatment/Activity Tolerance:  [x] Patient tolerated treatment well [] Patient limited by fatigue  [] Patient limited by pain  [] Patient limited by other medical complications  [] Other: Assessment: Pt presents s/p R NATASHA 01/11/2021 with pain, decreased ROM/strength limiting N gait, WB tolerance, stairs, household chores and social activities, driving. Pt demonstrating improvement this date with improved gait pattern and use of SPC. Good tolerance for therapeutic exercises this date. Appropriate form demonstrated with standing exercises- issued as HEP. Pt is progressing well and as expected. Pt would benefit from skilled physical therapy services to address listed deficits to return to [facility].     Prognosis: [x] Good [] Fair  [] Poor    Patient Requires Follow-up: [x] Yes  [] No    Goals:  Short term goals  Time Frame for Short term goals: 3 weeks  Short term goal 1: Pt will demo good understanding and knowledge of initial HEP  Short term goal 2: PROM R hip flexion 90 deg and AROM R hip abd 30 deg to allow for improving gait pattern  Short term goal 3: Strength R hip abd 3/5 to allow for improved WB tolerance  Long term goals  Time Frame for Long term goals : 6 weeks  Long term goal 1: Pt will demo good understanding and knowledge of HEP progressions  Long term goal 2: Pain 1/10 at worst to allow for WB tolerance as desired  Long term goal 3: AROM R hip WFLs within restrictions to allow for N gait pattern without AD with gait speed: 2.99 ft/sec without increased pain  Long term goal 4: Strength R hip 5/5 and R knee ext 5/5 to allow for pt able to ascend/descend stairs reciprocally and allow entry into camper van without increased pain  Long term goal 5: Decreased impairment per LEFS >/= 60/80    Plan:   Visits per week: 2   # of weeks: 6     [x] Continue per plan of care [] Alter current plan (see comments)  [] Plan of care initiated [] Hold pending MD visit [] Discharge    Plan for Next Session:  Add exercises as tolerated    Electronically signed by:  Corie Roberts DPT [id number]

## 2021-02-10 ENCOUNTER — HOSPITAL ENCOUNTER (OUTPATIENT)
Dept: PHYSICAL THERAPY | Age: 61
Setting detail: THERAPIES SERIES
Discharge: HOME | End: 2021-02-10
Payer: COMMERCIAL

## 2021-02-10 PROCEDURE — 97110 THERAPEUTIC EXERCISES: CPT

## 2021-02-10 PROCEDURE — 97116 GAIT TRAINING THERAPY: CPT

## 2021-02-10 NOTE — FLOWSHEET NOTE
Physical Therapy Daily Treatment Note  Date:  2/10/2021    Patient Name:  Apolonia Bustos    :  1960  MRN: [id number]  Restrictions/Precautions:   posterior hip precautions R, hypothyroid, migraines, LBP, OA, controlled depression, cough variant asthma, Flexeril allergy, HWR R foot, Hx B RCR   Medical/Treatment Diagnosis Information:  · Diagnosis: Primary osteoarthritis of right hip (M16.11), Contracture of right hip (M24.551), Hip abductor tendinitis, right (P03.562 )  · Treatment Diagnosis: R hip pain s/p R NATASHA  Insurance/Certification information:  PT Insurance Information: [employer], 20 visits per erna year (then call for PA if need additional)   Physician Information:  Referring Practitioner: Margo Bunch MD MD Follow-up Visit: 2021 after she today  Plan of care signed (Y/N):  Sent to  via [facility]  Visit# / total visits:  3/12  Pain level: 0/10     Progress Note Due (10 visits or 30 days, whichever is less):    Recertification Note Due (End of POC or 90 days, whichever is less):      Subjective: Pt reports no pain at this time. Pt very excited about progress. Objective:2021  ? Observation:    Pt ambulates with SPC this date. Pt with step through gait, mildly antalgic, but safe with no LOB. ?  Test measurements:    PROM RLE (degrees)  RLE General PROM: hip flexion 80 deg with groin pain  AROM RLE (degrees)  RLE General AROM: knee 0-110 deg, hip abd 20 deg  PROM LLE (degrees)  LLE General PROM: hip flexion ~ 100 deg  AROM LLE (degrees)  LLE General AROM: knee 0-110 deg, hip abd 30 deg     Strength Other  Other: B ankle DF 5/5, B knee flex 5/5, L knee ext 5/5, R knee ext 4/5, R hip abd 2/5, L hip abd at least 2/5 but unable to test against gravity as pt unable lay on R side due to pain, hip flexion not tested due to time  Additional Measures Assessment: Pt presents s/p R NATASHA 01/11/2021 with pain, decreased ROM/strength limiting N gait, WB tolerance, stairs, household chores and social activities, driving. Pt progressing well with gait demonstration with SPC. Pt with good tolerance for all therapeutic exercise this date. Pt provided education for stair climbing technique with use of SPC, and demonstrates appropriately. Pt would benefit from skilled physical therapy services to address listed deficits to return to PLOF.     Prognosis: [x] Good [] Fair  [] Poor    Patient Requires Follow-up: [x] Yes  [] No    Goals:  Short term goals  Time Frame for Short term goals: 3 weeks  Short term goal 1: Pt will demo good understanding and knowledge of initial HEP  Short term goal 2: PROM R hip flexion 90 deg and AROM R hip abd 30 deg to allow for improving gait pattern  Short term goal 3: Strength R hip abd 3/5 to allow for improved WB tolerance  Long term goals  Time Frame for Long term goals : 6 weeks  Long term goal 1: Pt will demo good understanding and knowledge of HEP progressions  Long term goal 2: Pain 1/10 at worst to allow for WB tolerance as desired  Long term goal 3: AROM R hip WFLs within restrictions to allow for N gait pattern without AD with gait speed: 2.99 ft/sec without increased pain  Long term goal 4: Strength R hip 5/5 and R knee ext 5/5 to allow for pt able to ascend/descend stairs reciprocally and allow entry into camper van without increased pain  Long term goal 5: Decreased impairment per LEFS >/= 60/80    Plan:   Visits per week: 2   # of weeks: 6     [x] Continue per plan of care [] Alter current plan (see comments)  [] Plan of care initiated [] Hold pending MD visit [] Discharge    Plan for Next Session:  Add exercises as tolerated    Electronically signed by:  Suri Rodriguez, MARINA [id number]

## 2021-02-12 ENCOUNTER — HOSPITAL ENCOUNTER (OUTPATIENT)
Dept: PHYSICAL THERAPY | Age: 61
Setting detail: THERAPIES SERIES
Discharge: HOME | End: 2021-02-12
Payer: COMMERCIAL

## 2021-02-12 PROCEDURE — 97110 THERAPEUTIC EXERCISES: CPT

## 2021-02-12 PROCEDURE — 97116 GAIT TRAINING THERAPY: CPT

## 2021-02-12 NOTE — FLOWSHEET NOTE
Physical Therapy Daily Treatment Note  Date:  2021    Patient Name:  Ed Rodriguez    :  1960  MRN: [id number]  Restrictions/Precautions:   posterior hip precautions R, hypothyroid, migraines, LBP, OA, controlled depression, cough variant asthma, Flexeril allergy, HWR R foot, Hx B RCR   Medical/Treatment Diagnosis Information:  · Diagnosis: Primary osteoarthritis of right hip (M16.11), Contracture of right hip (M24.551), Hip abductor tendinitis, right (B87.742 )  · Treatment Diagnosis: R hip pain s/p R NATASHA  Insurance/Certification information:  PT Insurance Information: [employer], 20 visits per erna year (then call for PA if need additional)   Physician Information:  Referring Practitioner: Doris Bee MD MD Follow-up Visit:   Plan of care signed (Y/N):  Y  Visit# / total visits:    Pain level: 1/10 R anterior lateral proximal hip     Progress Note Due (10 visits or 30 days, whichever is less):    Recertification Note Due (End of POC or 90 days, whichever is less):      Subjective: Pt reports a little soreness, thinks it is from sleeping on the couch. Objective:Pt amb w/ SPC in L hand, minimally antalgic.     2021  ? Observation:    Pt ambulates with SPC this date. Pt with step through gait, mildly antalgic, but safe with no LOB. ?  Test measurements:    PROM RLE (degrees)  RLE General PROM: hip flexion 80 deg with groin pain  AROM RLE (degrees)  RLE General AROM: knee 0-110 deg, hip abd 20 deg  PROM LLE (degrees)  LLE General PROM: hip flexion ~ 100 deg  AROM LLE (degrees)  LLE General AROM: knee 0-110 deg, hip abd 30 deg     Strength Other  Other: B ankle DF 5/5, B knee flex 5/5, L knee ext 5/5, R knee ext 4/5, R hip abd 2/5, L hip abd at least 2/5 but unable to test against gravity as pt unable lay on R side due to pain, hip flexion not tested due to time  Additional Measures Special Tests: TUG = 13.14 seconds with 2WW, 30 second sit to stand test = 0 reps (needs UE to A), 6 min walk test with 2WW = 905 ft = 276 meters  Other: LEFS = 35/80  Balance      Exercises, Neuro Facilitation & Gait Training (32152, [id number], [phone]): posterior hip precautions  Activity Resistance/Repetitions Other comments   Nustep (watch no hip flexion > 90 deg) 10 min L5    Standing marches (no > 90 deg hip flexion) 20x    Standing hip abd 20x B S/o slight pain with L hip abd when stabilizing on R LE. VC to avoid R ext rot at end range. Glut squeeze--> bridging partial ROM 10 x 2, 5\"    Heel slides 20x + 5x Mild fatigue at end of set   Supine abd 20x    Mini squats 20x    R LE Step ups 6\" 10x  8\" x 5x L rail   R SLR in hooklying 10 x 1 VC for abdom stabilization prior to lifting and avoiding breath holding   B KFO 10  X 1 Cautious to avoid forcefully pushing to midline                         Therapeutic Activities (65973):  Pt w/ questions re: return to previous activities such as driving, ambulating without cane, etc. Answered as able, directed pt to surgeon for driving. Home Exercise Program:   Pt. demonstrated good understanding and knowledge of HEP. Written instructions provided. 01/29/2021: continue heelslides, supine hip abd, quad sets, LAQ, SAQ as previously instructed, no additional due to time constraints    Access Code: [email or web address]   URL: [email or web address]   Date: 02/05/2021   Prepared by: Trev Chung     Exercises   Standing March with Counter Support - 10 reps - 1 sets - 2x daily - 7x weekly   Standing Partial Squat - 10 reps - 1 sets - 2x daily - 7x weekly   Standing Hip Abduction with Counter Support - 10 reps - 1 sets - 2x daily - 7x weekly     Gait Train:   Cone step overs      Manual Treatments (39050):      Modalities: NA     Timed Code Treatment Minutes:  TE 35 minutes, Gait 10 min    Total Treatment Minutes:  45    Treatment/Activity Tolerance: [x] Patient tolerated treatment well [] Patient limited by fatigue  [] Patient limited by pain  [] Patient limited by other medical complications  [] Other:     Assessment: Pt presents s/p R NATASHA 01/11/2021 with pain, decreased ROM/strength limiting N gait, WB tolerance, stairs, household chores and social activities, driving. Pt mildly antalgic without SPC. Good tolerance of cone step-overs with minimal use of SPC. Pt fatigues at end of 1 set with most exercises. Encouraged pt to continue progressing sets of lower reps as able. Pt continues to use ice at home to reduce soreness as needed. Pt would benefit from skilled physical therapy services to address listed deficits to return to OF.     Prognosis: [x] Good [] Fair  [] Poor    Patient Requires Follow-up: [x] Yes  [] No    Goals:  Short term goals  Time Frame for Short term goals: 3 weeks  Short term goal 1: Pt will demo good understanding and knowledge of initial HEP  Short term goal 2: PROM R hip flexion 90 deg and AROM R hip abd 30 deg to allow for improving gait pattern  Short term goal 3: Strength R hip abd 3/5 to allow for improved WB tolerance  Long term goals  Time Frame for Long term goals : 6 weeks  Long term goal 1: Pt will demo good understanding and knowledge of HEP progressions  Long term goal 2: Pain 1/10 at worst to allow for WB tolerance as desired  Long term goal 3: AROM R hip WFLs within restrictions to allow for N gait pattern without AD with gait speed: 2.99 ft/sec without increased pain  Long term goal 4: Strength R hip 5/5 and R knee ext 5/5 to allow for pt able to ascend/descend stairs reciprocally and allow entry into [employer] van without increased pain  Long term goal 5: Decreased impairment per LEFS >/= 60/80    Plan:   Visits per week: 2   # of weeks: 6     [x] Continue per plan of care [] Alter current plan (see comments)  [] Plan of care initiated [] Hold pending MD visit [] Discharge Plan for Next Session:  Add exercises as tolerated, progress R LE WB tolerance/stability    Electronically signed by:  Nay Simmons DPT

## 2021-02-17 ENCOUNTER — HOSPITAL ENCOUNTER (OUTPATIENT)
Dept: PHYSICAL THERAPY | Age: 61
Setting detail: THERAPIES SERIES
Discharge: HOME | End: 2021-02-17
Payer: COMMERCIAL

## 2021-02-17 PROCEDURE — 97110 THERAPEUTIC EXERCISES: CPT

## 2021-02-17 NOTE — FLOWSHEET NOTE
Physical Therapy Daily Treatment Note  Date:  2021    Patient Name:  Smitha Heath    :  1960  MRN: [id number]  Restrictions/Precautions:   posterior hip precautions R, hypothyroid, migraines, LBP, OA, controlled depression, cough variant asthma, Flexeril allergy, HWR R foot, Hx B RCR   Medical/Treatment Diagnosis Information:  · Diagnosis: Primary osteoarthritis of right hip (M16.11), Contracture of right hip (M24.551), Hip abductor tendinitis, right (G15.063 )  · Treatment Diagnosis: R hip pain s/p R NATASHA  Insurance/Certification information:  PT Insurance Information: [employer], 20 visits per erna year (then call for PA if need additional)   Physician Information:  Referring Practitioner: Liyah Dunham MD MD Follow-up Visit:   Plan of care signed (Y/N):  Y  Visit# / total visits:    Pain level: 2/10 R anterior lateral proximal hip     Progress Note Due (10 visits or 30 days, whichever is less):    Recertification Note Due (End of POC or 90 days, whichever is less):      Subjective: Pt reports slight soreness this date. She reports she did not use her SPC inside yesterday, and that she may have overdone it. Objective:Pt amb w/o AD into clinic, antalgic gait on R but improving as ambulation continues. 2021  ? Observation:    Pt ambulates without AD this date. Pt with step through gait, antalgic, but safe with no LOB. ?  Test measurements:    PROM RLE (degrees)  RLE General PROM: hip flexion 80 deg with groin pain  AROM RLE (degrees)  RLE General AROM: knee 0-110 deg, hip abd 20 deg  PROM LLE (degrees)  LLE General PROM: hip flexion ~ 100 deg  AROM LLE (degrees)  LLE General AROM: knee 0-110 deg, hip abd 30 deg     Strength Other  Other: B ankle DF 5/5, B knee flex 5/5, L knee ext 5/5, R knee ext 4/5, R hip abd 2/5, L hip abd at least 2/5 but unable to test against gravity as pt unable lay on R side due to pain, hip flexion not tested due to time Additional Measures  Special Tests: TUG = 13.14 seconds with 2WW, 30 second sit to stand test = 0 reps (needs UE to A), 6 min walk test with 2WW = 905 ft = 276 meters  Other: LEFS = 35/80  Balance      Exercises, Neuro Facilitation & Gait Training (60058, [id number], [id number]): posterior hip precautions  Activity Resistance/Repetitions Other comments   Nustep (watch no hip flexion > 90 deg) 10 min L6    Standing marches (no > 90 deg hip flexion) 20x Cues for appropriate speed and height provided   Standing hip abd 20x B S/o slight pain with L hip abd when stabilizing on R LE. VC to avoid R ext rot at end range. Glut squeeze--> bridging partial ROM 10 x 2, 5\"    Heel slides 20x + 5x Mild fatigue at end of set   Supine abd 20x    Mini squats 20x    R LE Step ups 6\" 10x  8\" x 5x L rail   R SLR in hooklying 10 x 1 VC for abdom stabilization prior to lifting and avoiding breath holding   B KFO 10  X 1 Cautious to avoid forcefully pushing to midline                         Therapeutic Activities (91034):      Home Exercise Program:   Pt. demonstrated good understanding and knowledge of HEP. Written instructions provided. 01/29/2021: continue heelslides, supine hip abd, quad sets, LAQ, SAQ as previously instructed, no additional due to time constraints    Access Code: [email or web address]   URL: [email or web address]   Date: 02/05/2021   Prepared by: Myla Ortiz     Exercises   Standing March with Counter Support - 10 reps - 1 sets - 2x daily - 7x weekly   Standing Partial Squat - 10 reps - 1 sets - 2x daily - 7x weekly   Standing Hip Abduction with Counter Support - 10 reps - 1 sets - 2x daily - 7x weekly     Gait Train:   Cone step overs      Manual Treatments (12505):      Modalities: NA     Timed Code Treatment Minutes:  TE 42 minutes    Total Treatment Minutes:  42    Treatment/Activity Tolerance:  [x] Patient tolerated treatment well [] Patient limited by fatigue [] Patient limited by pain  [] Patient limited by other medical complications  [] Other:     Assessment: Pt presents s/p R NATASHA 01/11/2021 with pain, decreased ROM/strength limiting N gait, WB tolerance, stairs, household chores and social activities, driving. Pt ambulates this date without use of SPC, antalgic on R LE but improving as session continues. Pt demonstrating improved endurance this date with therapeutic exercise reps. Added increased step ups of various heights with slow eccentric controlled descent. Educated pt to perform for HEP with smaller (no more than ~4\") step. Pt continues to use ice at home to reduce soreness as needed. Pt would benefit from skilled physical therapy services to address listed deficits to return to OF.     Prognosis: [x] Good [] Fair  [] Poor    Patient Requires Follow-up: [x] Yes  [] No    Goals:  Short term goals  Time Frame for Short term goals: 3 weeks  Short term goal 1: Pt will demo good understanding and knowledge of initial HEP  Short term goal 2: PROM R hip flexion 90 deg and AROM R hip abd 30 deg to allow for improving gait pattern  Short term goal 3: Strength R hip abd 3/5 to allow for improved WB tolerance  Long term goals  Time Frame for Long term goals : 6 weeks  Long term goal 1: Pt will demo good understanding and knowledge of HEP progressions  Long term goal 2: Pain 1/10 at worst to allow for WB tolerance as desired  Long term goal 3: AROM R hip WFLs within restrictions to allow for N gait pattern without AD with gait speed: 2.99 ft/sec without increased pain  Long term goal 4: Strength R hip 5/5 and R knee ext 5/5 to allow for pt able to ascend/descend stairs reciprocally and allow entry into [employer] [place] without increased pain  Long term goal 5: Decreased impairment per LEFS >/= 60/80    Plan:   Visits per week: 2   # of weeks: 6     [x] Continue per plan of care [] Alter current plan (see comments) [] Plan of care initiated [] Hold pending MD visit [] Discharge    Plan for Next Session:  Add exercises as tolerated, progress R LE WB tolerance/stability    Electronically signed by:  Nimisha Santos DPT

## 2021-02-19 ENCOUNTER — HOSPITAL ENCOUNTER (OUTPATIENT)
Dept: PHYSICAL THERAPY | Age: 61
Setting detail: THERAPIES SERIES
Discharge: HOME | End: 2021-02-19
Payer: COMMERCIAL

## 2021-02-19 ENCOUNTER — NURSE ONLY (OUTPATIENT)
Dept: FAMILY MEDICINE CLINIC | Age: 61
End: 2021-02-19
Payer: COMMERCIAL

## 2021-02-19 PROCEDURE — 97110 THERAPEUTIC EXERCISES: CPT

## 2021-02-19 PROCEDURE — 97116 GAIT TRAINING THERAPY: CPT

## 2021-02-19 PROCEDURE — 90750 HZV VACC RECOMBINANT IM: CPT | Performed by: FAMILY MEDICINE

## 2021-02-19 PROCEDURE — 90471 IMMUNIZATION ADMIN: CPT | Performed by: FAMILY MEDICINE

## 2021-02-19 NOTE — FLOWSHEET NOTE
Physical Therapy Daily Treatment Note  Date:  2021    Patient Name:  Robbert Osler    :  1960  MRN: [id number]  Restrictions/Precautions:   posterior hip precautions R, hypothyroid, migraines, LBP, OA, controlled depression, cough variant asthma, Flexeril allergy, HWR R foot, Hx B RCR   Medical/Treatment Diagnosis Information:  · Diagnosis: Primary osteoarthritis of right hip (M16.11), Contracture of right hip (M24.551), Hip abductor tendinitis, right (Y91.627 )  · Treatment Diagnosis: R hip pain s/p R NATASHA  Insurance/Certification information:  PT Insurance Information: [employer], 20 visits per erna year (then call for PA if need additional)   Physician Information:  Referring Practitioner: Geronimo Ewing MD MD Follow-up Visit:   Plan of care signed (Y/N):  Y  Visit# / total visits:    Pain level: 1/10 R anterior lateral proximal hip     Progress Note Due (10 visits or 30 days, whichever is less):    Recertification Note Due (End of POC or 90 days, whichever is less):      Subjective: Pt reports continuing to feel off balance with gait deficits when attempting to ambulate without AD. Objective:Pt amb w/o AD into clinic (SPC in hand), antalgic gait on R but improving as ambulation continues. 2021  ? Observation:    Pt ambulates without AD this date. Pt with step through gait, antalgic, but safe with no LOB. ?  Test measurements:    PROM RLE (degrees)  RLE General PROM: hip flexion 80 deg with groin pain  AROM RLE (degrees)  RLE General AROM: knee 0-110 deg, hip abd 20 deg  PROM LLE (degrees)  LLE General PROM: hip flexion ~ 100 deg  AROM LLE (degrees)  LLE General AROM: knee 0-110 deg, hip abd 30 deg     Strength Other  Other: B ankle DF 5/5, B knee flex 5/5, L knee ext 5/5, R knee ext 4/5, R hip abd 2/5, L hip abd at least 2/5 but unable to test against gravity as pt unable lay on R side due to pain, hip flexion not tested due to time  Additional Measures Special Tests: TUG = 13.14 seconds with 2WW, 30 second sit to stand test = 0 reps (needs UE to A), 6 min walk test with 2WW = 905 ft = 276 meters  Other: LEFS = 35/80  Balance      Exercises, Neuro Facilitation & Gait Training (82262, [address], [id number]): posterior hip precautions  Activity Resistance/Repetitions Other comments   Nustep (watch no hip flexion > 90 deg) 10 min L6 obs for appropriate ROM   Standing marches (no > 90 deg hip flexion) 20x Cues for appropriate speed and height provided   Standing hip abd 20x B S/o slight pain with L hip abd when stabilizing on R LE. VC to avoid R ext rot at end range. Glut squeeze--> bridging partial ROM 10 x 2, 5\"    Heel slides 20x + 5x Mild fatigue at end of set   Supine abd 20x    Mini squats 20x    R SLR in hooklying 10 x 1 VC for abdom stabilization prior to lifting and avoiding breath holding                         Therapeutic Activities (79171):      Home Exercise Program:   Pt. demonstrated good understanding and knowledge of HEP. Written instructions provided. 01/29/2021: continue heelslides, supine hip abd, quad sets, LAQ, SAQ as previously instructed, no additional due to time constraints    Access Code: [email or web address]   URL: [email or web address]   Date: 02/05/2021   Prepared by: Luzma Suazo     Exercises   Standing March with Counter Support - 10 reps - 1 sets - 2x daily - 7x weekly   Standing Partial Squat - 10 reps - 1 sets - 2x daily - 7x weekly   Standing Hip Abduction with Counter Support - 10 reps - 1 sets - 2x daily - 7x weekly     Gait Train:   8 min gait training with heel to toe ambulation and slow marching ambulation for improved balance and motor control.       Manual Treatments (62553):      Modalities: NA     Timed Code Treatment Minutes:  TE 35 minutes, GT 8    Total Treatment Minutes:  43    Treatment/Activity Tolerance:  [x] Patient tolerated treatment well [] Patient limited by fatigue [] Patient limited by pain  [] Patient limited by other medical complications  [] Other:     Assessment: Pt presents s/p R NATASHA 01/11/2021 with pain, decreased ROM/strength limiting N gait, WB tolerance, stairs, household chores and social activities, driving. Pt continues to report balance deficits with ambulation and presents with significant antalgic gait still. Attempted marching activity with decreased speed for increased focus on balance and control. Pt notes difficulty and fatigue with this task. Encouraged pt to continue at home for HEP with wall/counter available to prevent falling. Pt continues to use ice at home to reduce soreness as needed. Pt would benefit from skilled physical therapy services to address listed deficits to return to OF.     Prognosis: [x] Good [] Fair  [] Poor    Patient Requires Follow-up: [x] Yes  [] No    Goals:  Short term goals  Time Frame for Short term goals: 3 weeks  Short term goal 1: Pt will demo good understanding and knowledge of initial HEP  Short term goal 2: PROM R hip flexion 90 deg and AROM R hip abd 30 deg to allow for improving gait pattern  Short term goal 3: Strength R hip abd 3/5 to allow for improved WB tolerance  Long term goals  Time Frame for Long term goals : 6 weeks  Long term goal 1: Pt will demo good understanding and knowledge of HEP progressions  Long term goal 2: Pain 1/10 at worst to allow for WB tolerance as desired  Long term goal 3: AROM R hip WFLs within restrictions to allow for N gait pattern without AD with gait speed: 2.99 ft/sec without increased pain  Long term goal 4: Strength R hip 5/5 and R knee ext 5/5 to allow for pt able to ascend/descend stairs reciprocally and allow entry into [employer] van without increased pain  Long term goal 5: Decreased impairment per LEFS >/= 60/80    Plan:   Visits per week: 2   # of weeks: 6     [x] Continue per plan of care [] Alter current plan (see comments) [] Plan of care initiated [] Hold pending MD visit [] Discharge    Plan for Next Session:  Add exercises as tolerated, progress R LE WB tolerance/stability    Electronically signed by:  Jeimy Daigle DPT

## 2021-02-24 ENCOUNTER — HOSPITAL ENCOUNTER (OUTPATIENT)
Dept: PHYSICAL THERAPY | Age: 61
Setting detail: THERAPIES SERIES
Discharge: HOME | End: 2021-02-24
Payer: COMMERCIAL

## 2021-02-24 PROCEDURE — 97110 THERAPEUTIC EXERCISES: CPT

## 2021-02-24 PROCEDURE — 97116 GAIT TRAINING THERAPY: CPT

## 2021-02-24 NOTE — FLOWSHEET NOTE
Physical Therapy Daily Treatment Note  Date:  2021    Patient Name:  Jody Median    :  1960  MRN: [id number]  Restrictions/Precautions:   posterior hip precautions R, hypothyroid, migraines, LBP, OA, controlled depression, cough variant asthma, Flexeril allergy, HWR R foot, Hx B RCR   Medical/Treatment Diagnosis Information:  · Diagnosis: Primary osteoarthritis of right hip (M16.11), Contracture of right hip (M24.551), Hip abductor tendinitis, right (O11.530 )  · Treatment Diagnosis: R hip pain s/p R NATASHA  Insurance/Certification information:  PT Insurance Information: , 20 visits per  year (then call for PA if need additional)   Physician Information:  Referring Practitioner: Celina Samson MD MD Follow-up Visit:   Plan of care signed (Y/N):  Y  Visit# / total visits:    Pain level: 2/10 R anterior lateral proximal hip     Progress Note Due (10 visits or 30 days, whichever is less):    Recertification Note Due (End of POC or 90 days, whichever is less):      Subjective: Pt reports slightly increased pain this date in R lateral hip. Reports long day yesterday [place] to  [place] and 2 hour commute each way. Pt also notes significant practice with marching drills over the weekend to improve balance. Objective:Pt amb w/ SPC into clinic    2021  ? Observation:    Pt ambulates with SPC this date. Pt with step through gait, antalgic, but safe with no LOB. ?  Test measurements:    PROM RLE (degrees)  RLE General PROM: hip flexion 80 deg with groin pain  AROM RLE (degrees)  RLE General AROM: knee 0-110 deg, hip abd 20 deg  PROM LLE (degrees)  LLE General PROM: hip flexion ~ 100 deg  AROM LLE (degrees)  LLE General AROM: knee 0-110 deg, hip abd 30 deg     Strength Other Other: B ankle DF 5/5, B knee flex 5/5, L knee ext 5/5, R knee ext 4/5, R hip abd 2/5, L hip abd at least 2/5 but unable to test against gravity as pt unable lay on R side due to pain, hip flexion not tested due to time  Additional Measures  Special Tests: TUG = 13.14 seconds with 2WW, 30 second sit to stand test = 0 reps (needs UE to A), 6 min walk test with 2WW = 905 ft = 276 meters  Other: LEFS = 35/80  Balance      Exercises, Neuro Facilitation & Gait Training (54420, [id number], [id number]): posterior hip precautions  Activity Resistance/Repetitions Other comments   Nustep (watch no hip flexion > 90 deg) 10 min L5 obs for appropriate ROM   Standing marches (no > 90 deg hip flexion) 20x Cues for appropriate speed and height provided   Standing hip abd 20x B S/o slight pain with L hip abd when stabilizing on R LE. VC to avoid R ext rot at end range. Glut squeeze--> bridging partial ROM 10 x 2, 5\"    Heel slides 20x + 5x Mild fatigue at end of set   Supine abd 20x    Mini squats 20x    R SLR in hooklying 10 x 1 VC for abdom stabilization prior to lifting and avoiding breath holding                         Therapeutic Activities (15385):      Home Exercise Program:   Pt. demonstrated good understanding and knowledge of HEP. Written instructions provided. 01/29/2021: continue heelslides, supine hip abd, quad sets, LAQ, SAQ as previously instructed, no additional due to time constraints    Access Code: [email or web address]   URL: "[email or web address]   Date: 02/05/2021   Prepared by: Bob Bachelor     Exercises   Standing March with Counter Support - 10 reps - 1 sets - 2x daily - 7x weekly   Standing Partial Squat - 10 reps - 1 sets - 2x daily - 7x weekly   Standing Hip Abduction with Counter Support - 10 reps - 1 sets - 2x daily - 7x weekly     Gait Train:   8 min gait training with heel to toe ambulation and slow marching ambulation for improved balance and motor control.       Manual Treatments (33910): Modalities: NA     Timed Code Treatment Minutes:  TE 35 minutes, GT 8    Total Treatment Minutes:  43    Treatment/Activity Tolerance:  [x] Patient tolerated treatment well [] Patient limited by fatigue  [] Patient limited by pain  [] Patient limited by other medical complications  [] Other:     Assessment: Pt presents s/p R NATASHA 01/11/2021 with pain, decreased ROM/strength limiting N gait, WB tolerance, stairs, household chores and social activities, driving. Pt with noted improvement in balance this date, demonstrating no LOB with marching activities. Did encourage pt to decrease focus on this exercise at home for decreased pain. Pt does continue to have fatigue with the marching task. Encouraged increased ice use at home d/t increased pain levels. Pt would benefit from skilled physical therapy services to address listed deficits to return to [facility].     Prognosis: [x] Good [] Fair  [] Poor    Patient Requires Follow-up: [x] Yes  [] No    Goals:  Short term goals  Time Frame for Short term goals: 3 weeks  Short term goal 1: Pt will demo good understanding and knowledge of initial HEP  Short term goal 2: PROM R hip flexion 90 deg and AROM R hip abd 30 deg to allow for improving gait pattern  Short term goal 3: Strength R hip abd 3/5 to allow for improved WB tolerance  Long term goals  Time Frame for Long term goals : 6 weeks  Long term goal 1: Pt will demo good understanding and knowledge of HEP progressions  Long term goal 2: Pain 1/10 at worst to allow for WB tolerance as desired  Long term goal 3: AROM R hip WFLs within restrictions to allow for N gait pattern without AD with gait speed: 2.99 ft/sec without increased pain  Long term goal 4: Strength R hip 5/5 and R knee ext 5/5 to allow for pt able to ascend/descend stairs reciprocally and allow entry into camper van without increased pain  Long term goal 5: Decreased impairment per LEFS >/= 60/80    Plan:   Visits per week: 2   # of weeks: 6 [x] Continue per plan of care [] Alter current plan (see comments)  [] Plan of care initiated [] Hold pending MD visit [] Discharge    Plan for Next Session:  Add exercises as tolerated, progress R LE WB tolerance/stability     Electronically signed by:  Rachna Alvarenga DPT

## 2021-02-26 ENCOUNTER — HOSPITAL ENCOUNTER (OUTPATIENT)
Dept: PHYSICAL THERAPY | Age: 61
Setting detail: THERAPIES SERIES
Discharge: HOME | End: 2021-02-26
Payer: COMMERCIAL

## 2021-02-26 NOTE — CARE COORDINATION
Physical Therapy  Cancellation/No-show Note  Patient Name:  Cuca Mueller  :  1960   Date:  2021  MRN: [id number]  Cancelled visits to date: 2  No-shows to date: 0    For today's appointment patient:  [x]  Cancelled  []  Rescheduled appointment  []  No-show     Reason given by patient:  [x]  Patient ill  []  Conflicting appointment  [x]  No transportation    []  Conflict with work  []  No reason given  []  Other:     Comments:      Electronically signed by:  Jordyn Lemus PT

## 2021-03-12 ENCOUNTER — PATIENT MESSAGE (OUTPATIENT)
Dept: FAMILY MEDICINE CLINIC | Age: 61
End: 2021-03-12

## 2021-03-12 NOTE — TELEPHONE ENCOUNTER
From: Roberto Galvan  To: Thu Guillory MD  Sent: 3/12/2021 10:19 AM EST  Subject: Non-Urgent Medical Question    I don't really have a question. Just a notification that I've received first dose Covid vaccine in L deltoid on 3/11/21. Second dose scheduled for 4/8/21. Emile Cornejo. Administered by [facility] personnel. Please make a note in my chart. Thanks! I'll attach an image of the card after I get the second dose.      Thanks, Lilly Finch

## 2021-04-09 DIAGNOSIS — E78.00 HYPERCHOLESTEREMIA: ICD-10-CM

## 2021-04-09 DIAGNOSIS — G43.009 MIGRAINE WITHOUT AURA AND WITHOUT STATUS MIGRAINOSUS, NOT INTRACTABLE: ICD-10-CM

## 2021-04-12 ENCOUNTER — PATIENT MESSAGE (OUTPATIENT)
Dept: FAMILY MEDICINE CLINIC | Age: 61
End: 2021-04-12

## 2021-04-12 RX ORDER — SUMATRIPTAN 100 MG/1
TABLET, FILM COATED ORAL
Qty: 27 TABLET | Refills: 3 | OUTPATIENT
Start: 2021-04-12

## 2021-04-12 RX ORDER — FUROSEMIDE 20 MG/1
TABLET ORAL
Qty: 180 TABLET | Refills: 3 | OUTPATIENT
Start: 2021-04-12

## 2021-04-12 RX ORDER — LEVOTHYROXINE SODIUM 0.05 MG/1
TABLET ORAL
Qty: 90 TABLET | Refills: 3 | OUTPATIENT
Start: 2021-04-12

## 2021-04-12 RX ORDER — ATORVASTATIN CALCIUM 20 MG/1
TABLET, FILM COATED ORAL
Qty: 90 TABLET | Refills: 3 | OUTPATIENT
Start: 2021-04-12

## 2021-04-12 RX ORDER — ALBUTEROL SULFATE 90 UG/1
2 AEROSOL, METERED RESPIRATORY (INHALATION) EVERY 6 HOURS PRN
Qty: 1 INHALER | Refills: 11 | Status: SHIPPED | OUTPATIENT
Start: 2021-04-12

## 2021-04-12 RX ORDER — POTASSIUM CHLORIDE 750 MG/1
TABLET, FILM COATED, EXTENDED RELEASE ORAL
Qty: 180 TABLET | Refills: 3 | OUTPATIENT
Start: 2021-04-12

## 2021-04-12 RX ORDER — MONTELUKAST SODIUM 10 MG/1
10 TABLET ORAL DAILY
Qty: 90 TABLET | Refills: 3 | OUTPATIENT
Start: 2021-04-12

## 2021-04-12 NOTE — TELEPHONE ENCOUNTER
Patient's response -     I tried to renew my prescriptions online, and wasn't able to submit the request, except for the Sumatriptan. So I went to my [facility] to  the Sumatriptan Rx, and asked why I was not able to refill the other 6 Rx's. It was the [facility] pharmacy personnel that informed me that there were no refills. So even though I see them online, they told me they were not. I will call again and see if they can dig a little deeper. Stay tuned. And thanks.    Terri Moody

## 2021-04-12 NOTE — TELEPHONE ENCOUNTER
From: Marquez Hernández  To: Kimberli Skelton MD  Sent: 4/12/2021 10:42 AM EDT  Subject: Non-Urgent Medical Question    For Vanna,  2 things: 1) Rx mystery solved. I thought I had my Rx's through [facility] in [place], but they are at [address] [place] [address] mail-in service. I was unable to see that info without opening each RX and looking under the DETAILS section. It's odd they use the same online martin, though. Whatever, right? I submitted them for [employer] and they will mail them. Sorry for the confusion. 2) Can you give me the name and phone to make an appt with the Clinical Psychologist there?  I think her name is Scott Home?     Thanks, Jam Flynn

## 2021-04-12 NOTE — TELEPHONE ENCOUNTER
From: BETH LEY  To: Haile Higuera  Sent: 4/12/2021 8:33 AM EDT  Subject: refills request    Jose Manuel Michaels    Your should have refills at [facility] for all your refill request. Please call your pharmacy.    Have a great day    Jesus Drake CMA

## 2021-04-12 NOTE — TELEPHONE ENCOUNTER
Requested Prescriptions     Pending Prescriptions Disp Refills    levothyroxine (SYNTHROID) 50 MCG tablet 90 tablet 3     Sig: TAKE 1 TABLET BY MOUTH EVERY DAY    furosemide (LASIX) 20 MG tablet 180 tablet 3     Sig: TAKE 1 TABLET BY MOUTH TWICE A DAY    montelukast (SINGULAIR) 10 MG tablet 90 tablet 3     Sig: Take 1 tablet by mouth daily    potassium chloride (KLOR-CON 10) 10 MEQ extended release tablet 180 tablet 3     Sig: TAKE 1 TABLET BY MOUTH TWICE A DAY    atorvastatin (LIPITOR) 20 MG tablet 90 tablet 3     Sig: TAKE 1 TABLET BY MOUTH EVERY DAY    SUMAtriptan (IMITREX) 100 MG tablet 27 tablet 3     Sig: TAKE ONE TABLET BY MOUTH AT ONSET OF HEADACHE; MAY REPEAT ONE TABLET IN 2 HOURS IF NEEDED. MAX 2 TABLETS IN 24 HOURS. Pt had all medications refilled 1/4/21 for 90 day with refills. Pt advised to call pharmacy for refills.

## 2021-04-30 ENCOUNTER — OFFICE VISIT (OUTPATIENT)
Dept: ORTHOPEDIC SURGERY | Age: 61
End: 2021-04-30
Payer: COMMERCIAL

## 2021-04-30 VITALS — HEIGHT: 66 IN | WEIGHT: 244 LBS | BODY MASS INDEX: 39.21 KG/M2

## 2021-04-30 DIAGNOSIS — M25.551 CHRONIC HIP PAIN AFTER TOTAL REPLACEMENT OF RIGHT HIP JOINT: Primary | ICD-10-CM

## 2021-04-30 DIAGNOSIS — M16.11 PRIMARY OSTEOARTHRITIS OF RIGHT HIP: ICD-10-CM

## 2021-04-30 DIAGNOSIS — M54.50 LOW BACK PAIN, UNSPECIFIED BACK PAIN LATERALITY, UNSPECIFIED CHRONICITY, UNSPECIFIED WHETHER SCIATICA PRESENT: ICD-10-CM

## 2021-04-30 DIAGNOSIS — Z96.641 STATUS POST TOTAL REPLACEMENT OF RIGHT HIP: ICD-10-CM

## 2021-04-30 DIAGNOSIS — Z96.641 CHRONIC HIP PAIN AFTER TOTAL REPLACEMENT OF RIGHT HIP JOINT: Primary | ICD-10-CM

## 2021-04-30 DIAGNOSIS — M75.81 ROTATOR CUFF TENDINITIS, RIGHT: ICD-10-CM

## 2021-04-30 DIAGNOSIS — M25.511 RIGHT SHOULDER PAIN, UNSPECIFIED CHRONICITY: ICD-10-CM

## 2021-04-30 DIAGNOSIS — G89.29 CHRONIC HIP PAIN AFTER TOTAL REPLACEMENT OF RIGHT HIP JOINT: Primary | ICD-10-CM

## 2021-04-30 DIAGNOSIS — M24.551 CONTRACTURE OF RIGHT HIP: ICD-10-CM

## 2021-04-30 PROCEDURE — 1036F TOBACCO NON-USER: CPT | Performed by: ORTHOPAEDIC SURGERY

## 2021-04-30 PROCEDURE — 20610 DRAIN/INJ JOINT/BURSA W/O US: CPT | Performed by: ORTHOPAEDIC SURGERY

## 2021-04-30 PROCEDURE — G8417 CALC BMI ABV UP PARAM F/U: HCPCS | Performed by: ORTHOPAEDIC SURGERY

## 2021-04-30 PROCEDURE — G8427 DOCREV CUR MEDS BY ELIG CLIN: HCPCS | Performed by: ORTHOPAEDIC SURGERY

## 2021-04-30 PROCEDURE — 3017F COLORECTAL CA SCREEN DOC REV: CPT | Performed by: ORTHOPAEDIC SURGERY

## 2021-04-30 PROCEDURE — 99214 OFFICE O/P EST MOD 30 MIN: CPT | Performed by: ORTHOPAEDIC SURGERY

## 2021-04-30 RX ORDER — CELECOXIB 200 MG/1
200 CAPSULE ORAL DAILY
Qty: 60 CAPSULE | Refills: 3 | Status: SHIPPED | OUTPATIENT
Start: 2021-04-30 | End: 2021-05-11

## 2021-04-30 RX ORDER — LIDOCAINE HYDROCHLORIDE 10 MG/ML
40 INJECTION, SOLUTION INFILTRATION; PERINEURAL ONCE
Status: COMPLETED | OUTPATIENT
Start: 2021-04-30 | End: 2021-04-30

## 2021-04-30 RX ORDER — BUPIVACAINE HYDROCHLORIDE 2.5 MG/ML
10 INJECTION, SOLUTION INFILTRATION; PERINEURAL ONCE
Status: COMPLETED | OUTPATIENT
Start: 2021-04-30 | End: 2021-04-30

## 2021-04-30 RX ORDER — TRIAMCINOLONE ACETONIDE 40 MG/ML
80 INJECTION, SUSPENSION INTRA-ARTICULAR; INTRAMUSCULAR ONCE
Status: COMPLETED | OUTPATIENT
Start: 2021-04-30 | End: 2021-04-30

## 2021-04-30 RX ADMIN — TRIAMCINOLONE ACETONIDE 80 MG: 40 INJECTION, SUSPENSION INTRA-ARTICULAR; INTRAMUSCULAR at 08:51

## 2021-04-30 RX ADMIN — BUPIVACAINE HYDROCHLORIDE 10 MG: 2.5 INJECTION, SOLUTION INFILTRATION; PERINEURAL at 08:50

## 2021-04-30 RX ADMIN — LIDOCAINE HYDROCHLORIDE 40 MG: 10 INJECTION, SOLUTION INFILTRATION; PERINEURAL at 08:51

## 2021-04-30 NOTE — PROGRESS NOTES
Dr Génesis Lara      Date /Time 4/30/2021             10:38 AM EDT  Name America Castañeda             1960   Location  [facility]  MRN [id number]                Chief Complaint   Patient presents with    Post-Op Check     CK RIGHT THR 1/11/21        History of Present Illness    America Castañeda is a 61 y.o. female who presents with  right Shoulder pain. She is also here for follow-up of her right hip replacement. She is doing quite well, has no complaints from her right hip. She does report several week history of right shoulder pain for which she is seeking care. Pain is activity related. She has pain with overhead motion and sleep. Denies any antecedent trauma associated with this. Not a work-related injury, no injury at all. She is otherwise right-hand dominant. She has had bilateral rotator cuff repairs done previously, right in 2010, left in 2017.     Past Medical History  Past Medical History:   Diagnosis Date    Acid reflux     Allergic rhinitis, cause unspecified     Arthritis     Attention deficit disorder without mention of hyperactivity 8/30/2011    Cough variant asthma     Cough variant asthma     Depression     Edema     HSV-1 infection     Hypercholesteremia     Hyperglycemia     Hypothyroidism     Lumbago     Migraine headache     Osteoarthritis of right hip 1/11/2021    Posterior vitreous detachment of left eye     Routine gynecological examination     Stress incontinence 9/28/2017     Past Surgical History:   Procedure Laterality Date    BREAST LUMPECTOMY      FOOT SURGERY Right 05/2016    fusion of metatarsal    ROTATOR CUFF REPAIR Right 2009    ROTATOR CUFF REPAIR Left 12/2017    TONSILLECTOMY AND ADENOIDECTOMY      TOTAL HIP ARTHROPLASTY Right 1/11/2021    RIGHT TOTAL HIP ARTHROPLASTY POSTERIOR,         RUBI BIOMET performed by Génesis Lara MD at [place] [address] History     Tobacco Use    Smoking status: Former Smoker     Packs/day: 1.00 Years: 16.00     Pack years: 16.00     Types: Cigarettes     Quit date: 1992     Years since quittin.3    Smokeless tobacco: Never Used   Substance Use Topics    Alcohol use: No      Current Outpatient Medications on File Prior to Visit   Medication Sig Dispense Refill    albuterol sulfate HFA (PROVENTIL HFA) 108 (90 Base) MCG/ACT inhaler Inhale 2 puffs into the lungs every 6 hours as needed for Wheezing 1 Inhaler 11    SUMAtriptan (IMITREX) 100 MG tablet TAKE ONE TABLET BY MOUTH AT ONSET OF HEADACHE; MAY REPEAT ONE TABLET IN 2 HOURS IF NEEDED. MAX 2 TABLETS IN 24 HOURS. 27 tablet 3    ondansetron (ZOFRAN) 4 MG tablet Take 1 tablet by mouth every 8 hours as needed for Nausea or Vomiting 30 tablet 1    aspirin 81 MG chewable tablet Take 1 tablet by mouth 2 times daily 60 tablet 0    celecoxib (CELEBREX) 100 MG capsule Take 1 capsule by mouth 2 times daily 60 capsule 0    topiramate (TOPAMAX) 100 MG tablet Take 100 mg by mouth 2 times daily      levothyroxine (SYNTHROID) 50 MCG tablet TAKE 1 TABLET BY MOUTH EVERY DAY 90 tablet 3    furosemide (LASIX) 20 MG tablet TAKE 1 TABLET BY MOUTH TWICE A  tablet 3    montelukast (SINGULAIR) 10 MG tablet Take 1 tablet by mouth daily 90 tablet 3    potassium chloride (KLOR-CON 10) 10 MEQ extended release tablet TAKE 1 TABLET BY MOUTH TWICE A  tablet 3    atorvastatin (LIPITOR) 20 MG tablet TAKE 1 TABLET BY MOUTH EVERY DAY 90 tablet 3    Famotidine-Ca Carb-Mag Hydrox (PEPCID COMPLETE) -165 MG CHEW Take by mouth daily as needed      acetaminophen (TYLENOL) 650 MG extended release tablet Take 1,300 mg by mouth 2 times daily as needed       clotrimazole-betamethasone (LOTRISONE) 1-0.05 % cream Apply topically 2 times daily. 45 g 0    NONFORMULARY Take 1 tablet by mouth nightly as needed Indications: Hylands leg cramps       No current facility-administered medications on file prior to visit.          ASCVD 10-YEAR RISK SCORE  The []Not tested   []  []Not tested    Sulcus []Side   []ER    []Side   []ER       Anterior apprehension  []  []Not tested   []  []Not tested    Relocation  []   []Not tested  []  []Not tested      Right hip: Incision well-healed. Pain-free range of motion of the hips. Nearly equal leg length. No Trendelenburg gait. Sacroiliac joint tenderness, right paraspinal muscle tenderness. No weakness or paresthesias present down right lower extremity. Imaging  Right Shoulder: [facility]  Radiographs: No evidence of fracture or dislocation, humeral head is well-aligned with glenoid, no glenohumeral joint degeneration, no AC joint degeneration  Previous metal anchor seen within the proximal humerus. Chronic tuberosity changes and narrowing of the acromiohumeral index. Assessment and Plan  Liam Mccarty was seen today for post-op check. Diagnoses and all orders for this visit:    Chronic hip pain after total replacement of right hip joint  -     XR HIP RIGHT (2-3 VIEWS); Future    Status post total replacement of right hip  -     XR HIP RIGHT (2-3 VIEWS); Future    Primary osteoarthritis of right hip  -     XR HIP RIGHT (2-3 VIEWS); Future    Contracture of right hip  -     XR HIP RIGHT (2-3 VIEWS); Future    Right shoulder pain, unspecified chronicity  -     XR SHOULDER RIGHT (MIN 2 VIEWS);  Future  -     RI ARTHROCENTESIS ASPIR&/INJ MAJOR JT/BURSA W/O US    Rotator cuff tendinitis, right  -     RI ARTHROCENTESIS ASPIR&/INJ MAJOR JT/BURSA W/O US    Low back pain, unspecified back pain laterality, unspecified chronicity, unspecified whether sciatica present  -     [facility] [place] [place]    Other orders  -     bupivacaine (MARCAINE) 0.25 % injection 10 mg  -     lidocaine 1 % injection 40 mg  -     triamcinolone acetonide (KENALOG-40) injection 80 mg        I discussed with Joseph Schwartz that her history, symptoms, signs and imaging are most consistent with rotator cuff tendonitis, bursitis and biceps tendonitis/degeneration. 3 months status post posterior right hip replacement, doing well from this. Back pain    We reviewed the natural history of these conditions and treatment options ranging from conservative measures (rest, icing, activity modification, physical therapy, pain meds, cortisone injection) to surgical options. In terms of treatment, I recommended continuing with rest, icing, avoidance of painful activities, NSAIDs or pain meds as tolerated, and home therapy. She has done previous stretching exercises from previous operations and is not interested in formal therapy for now    Right Shoulder Cortisone Injection: Subacromial CPT 72670  Consent was obtained after discussion of the risks, benefits, alternatives, including, but not limited to bleeding, pain, infection, skin disruption or discoloration. Laterality was confirmed (timeout). The shoulder was prepped with alcohol. A formulation of 2cc of 40mg/ml Kenalog, 4cc of 1% lidocaine, 4cc of 0.25% marcaine was injected into the subacromial space with a 25 gauge needle without difficulty. The site was cleaned and dressed with a band aid. She tolerated this well and there were no complications. We discussed surgical options as well, should conservative measures fail. For hip replacement, she has no complaints and is doing quite well. She does have sacroiliac back pain for which we will refer to [facility] Patient      Electronically signed by Joseph Gandhi MD on 4/30/2021 at 10:38 AM  This dictation was generated by voice recognition computer software. Although all attempts are made to edit the dictation for accuracy, there may be errors in the transcription that are not intended.

## 2021-05-11 ENCOUNTER — OFFICE VISIT (OUTPATIENT)
Dept: FAMILY MEDICINE CLINIC | Age: 61
End: 2021-05-11
Payer: COMMERCIAL

## 2021-05-11 ENCOUNTER — PATIENT MESSAGE (OUTPATIENT)
Dept: FAMILY MEDICINE CLINIC | Age: 61
End: 2021-05-11

## 2021-05-11 VITALS
SYSTOLIC BLOOD PRESSURE: 148 MMHG | OXYGEN SATURATION: 98 % | WEIGHT: 237 LBS | HEART RATE: 68 BPM | HEIGHT: 66 IN | TEMPERATURE: 98 F | BODY MASS INDEX: 38.09 KG/M2 | DIASTOLIC BLOOD PRESSURE: 96 MMHG

## 2021-05-11 DIAGNOSIS — H53.9 VISION CHANGES: ICD-10-CM

## 2021-05-11 DIAGNOSIS — G89.29 CHRONIC HAND PAIN, RIGHT: ICD-10-CM

## 2021-05-11 DIAGNOSIS — R03.0 ELEVATED BLOOD PRESSURE READING: ICD-10-CM

## 2021-05-11 DIAGNOSIS — J30.2 SEASONAL ALLERGIC RHINITIS, UNSPECIFIED TRIGGER: ICD-10-CM

## 2021-05-11 DIAGNOSIS — G89.29 CHRONIC HAND PAIN, RIGHT: Primary | ICD-10-CM

## 2021-05-11 DIAGNOSIS — G43.009 MIGRAINE WITHOUT AURA AND WITHOUT STATUS MIGRAINOSUS, NOT INTRACTABLE: Primary | ICD-10-CM

## 2021-05-11 DIAGNOSIS — I10 ESSENTIAL HYPERTENSION: ICD-10-CM

## 2021-05-11 DIAGNOSIS — M79.641 CHRONIC HAND PAIN, RIGHT: ICD-10-CM

## 2021-05-11 DIAGNOSIS — R73.9 HYPERGLYCEMIA: ICD-10-CM

## 2021-05-11 DIAGNOSIS — F33.0 MILD EPISODE OF RECURRENT MAJOR DEPRESSIVE DISORDER (HCC): ICD-10-CM

## 2021-05-11 DIAGNOSIS — M79.641 CHRONIC HAND PAIN, RIGHT: Primary | ICD-10-CM

## 2021-05-11 DIAGNOSIS — E78.00 HYPERCHOLESTEREMIA: ICD-10-CM

## 2021-05-11 PROCEDURE — 1036F TOBACCO NON-USER: CPT | Performed by: FAMILY MEDICINE

## 2021-05-11 PROCEDURE — G8427 DOCREV CUR MEDS BY ELIG CLIN: HCPCS | Performed by: FAMILY MEDICINE

## 2021-05-11 PROCEDURE — G8417 CALC BMI ABV UP PARAM F/U: HCPCS | Performed by: FAMILY MEDICINE

## 2021-05-11 PROCEDURE — 3017F COLORECTAL CA SCREEN DOC REV: CPT | Performed by: FAMILY MEDICINE

## 2021-05-11 PROCEDURE — 99214 OFFICE O/P EST MOD 30 MIN: CPT | Performed by: FAMILY MEDICINE

## 2021-05-11 RX ORDER — AMLODIPINE BESYLATE 5 MG/1
5 TABLET ORAL DAILY
Qty: 30 TABLET | Refills: 5 | Status: SHIPPED | OUTPATIENT
Start: 2021-05-11 | End: 2021-08-31 | Stop reason: SDUPTHER

## 2021-05-11 RX ORDER — IBUPROFEN 800 MG/1
800 TABLET ORAL 2 TIMES DAILY
COMMUNITY

## 2021-05-11 NOTE — PROGRESS NOTES
Here for f/u of mood, hot flashes    Pt cointinues to deal with moderate stressors, and states that she does feel that she is managing, and things are gradually improving. Pt does have some decent support through facebook, but has not done any formal counseling. Pt did have more significant sx in the winter. Pt does have hot flash issues as well, and we have discussed meds, including pristiq. Pt is concerned about living alone and taking side effects. Pt has noted some issues with vision, and some issues in HA. Pt has noted HA have increased, and does continue to use imitrex with help. Pt has noted HA sx increased in the past few weeks to months. No severe HA, does notice vision has changed and can have issues with focus, blurriness. Pt got new glasses about 1 year ago, but don't feel that they are that helpful. Pt does need to see ophthalmology    Pt here for follow up of blood pressure. Pt states doing great with adherence to therapy and feels well. No issues of chest pain, shortness of breath. No vision changes, headache, swelling in legs. Pt checking blood pressure at home, seeing 140's/80's consistently. No URI sx, nasal congestion. No sinus pressure and has not been feeling sick. No URI sx      Except as noted above in the history of present illness, the review of systems is  negative for headache, vision changes, chest pain, shortness of breath, abdominal pain, urinary sx, bowel changes. Past medical, surgical, and social history reviewed and updated  Medications and allergies reviewed and updated      O: BP (!) 148/96   Pulse 68   Temp 98 °F (36.7 °C)   Ht 5' 5.5\" (1.664 m)   Wt 237 lb (107.5 kg)   LMP 08/26/2019   SpO2 98%   BMI 38.84 kg/m²   GEN: No acute distress, cooperative, well nourished, alert. HEENT: PEERLA, EOMI , normocephalic/atraumatic, nares and oropharynx clear. Mucous membranes normal, Tympanic membranes clear bilaterally.   Neck: soft, supple, no thyromegaly, mass, no Lymphadenopathy  CV: Regular rate and rhythm, no murmur, rubs, gallops. No edema. Resp: Clear to auscultation bilaterally good air entry bilaterally  No crackles, wheeze. Breathing comfortably. Psych: mood stable, mild dysthymia but feels improved from prior. No suicidal thoughts or ideation        Current Outpatient Medications   Medication Sig Dispense Refill    ibuprofen (ADVIL;MOTRIN) 800 MG tablet Take 800 mg by mouth 2 times daily      amLODIPine (NORVASC) 5 MG tablet Take 1 tablet by mouth daily 30 tablet 5    albuterol sulfate HFA (PROVENTIL HFA) 108 (90 Base) MCG/ACT inhaler Inhale 2 puffs into the lungs every 6 hours as needed for Wheezing 1 Inhaler 11    SUMAtriptan (IMITREX) 100 MG tablet TAKE ONE TABLET BY MOUTH AT ONSET OF HEADACHE; MAY REPEAT ONE TABLET IN 2 HOURS IF NEEDED. MAX 2 TABLETS IN 24 HOURS. 27 tablet 3    aspirin 81 MG chewable tablet Take 1 tablet by mouth 2 times daily 60 tablet 0    levothyroxine (SYNTHROID) 50 MCG tablet TAKE 1 TABLET BY MOUTH EVERY DAY 90 tablet 3    furosemide (LASIX) 20 MG tablet TAKE 1 TABLET BY MOUTH TWICE A  tablet 3    montelukast (SINGULAIR) 10 MG tablet Take 1 tablet by mouth daily 90 tablet 3    potassium chloride (KLOR-CON 10) 10 MEQ extended release tablet TAKE 1 TABLET BY MOUTH TWICE A  tablet 3    atorvastatin (LIPITOR) 20 MG tablet TAKE 1 TABLET BY MOUTH EVERY DAY 90 tablet 3    Famotidine-Ca Carb-Mag Hydrox (PEPCID COMPLETE) -165 MG CHEW Take by mouth daily as needed      acetaminophen (TYLENOL) 650 MG extended release tablet Take 1,300 mg by mouth 2 times daily as needed       clotrimazole-betamethasone (LOTRISONE) 1-0.05 % cream Apply topically 2 times daily. 45 g 0    NONFORMULARY Take 1 tablet by mouth nightly as needed Indications: Hylands leg cramps       No current facility-administered medications for this visit.         Admission on 01/11/2021, Discharged on 01/13/2021   Component Date Value Ref Range Status    Sodium 01/12/2021 141  136 - 145 mmol/L Final    Potassium 01/12/2021 4.3  3.5 - 5.1 mmol/L Final    Chloride 01/12/2021 107  99 - 110 mmol/L Final    CO2 01/12/2021 26  21 - 32 mmol/L Final    Anion Gap 01/12/2021 8  3 - 16 Final    Glucose 01/12/2021 121* 70 - 99 mg/dL Final    BUN 01/12/2021 13  7 - 20 mg/dL Final    CREATININE 01/12/2021 0.7  0.6 - 1.2 mg/dL Final    GFR Non- 01/12/2021 >60  >60 Final    Comment: >60 mL/min/1.73m2 EGFR, calc. for ages 25 and older using the  MDRD formula (not corrected for weight), is valid for stable  renal function.  GFR  01/12/2021 >60  >60 Final    Comment: Chronic Kidney Disease: less than 60 ml/min/1.73 sq.m. Kidney Failure: less than 15 ml/min/1.73 sq.m. Results valid for patients 18 years and older.       Calcium 01/12/2021 9.3  8.3 - 10.6 mg/dL Final    WBC 01/12/2021 14.9* 4.0 - 11.0 K/uL Final    RBC 01/12/2021 3.74* 4.00 - 5.20 M/uL Final    Hemoglobin 01/12/2021 11.3* 12.0 - 16.0 g/dL Final    Hematocrit 01/12/2021 34.6* 36.0 - 48.0 % Final    MCV 01/12/2021 92.7  80.0 - 100.0 fL Final    MCH 01/12/2021 30.3  26.0 - 34.0 pg Final    MCHC 01/12/2021 32.7  31.0 - 36.0 g/dL Final    RDW 01/12/2021 14.5  12.4 - 15.4 % Final    Platelets 93/41/4969 194  135 - 450 K/uL Final    MPV 01/12/2021 8.8  5.0 - 10.5 fL Final    WBC 01/13/2021 10.7  4.0 - 11.0 K/uL Final    RBC 01/13/2021 3.80* 4.00 - 5.20 M/uL Final    Hemoglobin 01/13/2021 11.6* 12.0 - 16.0 g/dL Final    Hematocrit 01/13/2021 35.3* 36.0 - 48.0 % Final    MCV 01/13/2021 92.7  80.0 - 100.0 fL Final    MCH 01/13/2021 30.6  26.0 - 34.0 pg Final    MCHC 01/13/2021 33.0  31.0 - 36.0 g/dL Final    RDW 01/13/2021 14.2  12.4 - 15.4 % Final    Platelets 50/11/3326 173  135 - 450 K/uL Final    MPV 01/13/2021 8.9  5.0 - 10.5 fL Final    Sodium 01/13/2021 141  136 - 145 mmol/L Final    Potassium reflex Magnesium 01/13/2021 3.8  3.5 - 5.1 mmol/L Final    Chloride 01/13/2021 107  99 - 110 mmol/L Final    CO2 01/13/2021 26  21 - 32 mmol/L Final    Anion Gap 01/13/2021 8  3 - 16 Final    Glucose 01/13/2021 104* 70 - 99 mg/dL Final    BUN 01/13/2021 17  7 - 20 mg/dL Final    CREATININE 01/13/2021 0.6  0.6 - 1.2 mg/dL Final    GFR Non- 01/13/2021 >60  >60 Final    Comment: >60 mL/min/1.73m2 EGFR, calc. for ages 25 and older using the  MDRD formula (not corrected for weight), is valid for stable  renal function.  GFR  01/13/2021 >60  >60 Final    Comment: Chronic Kidney Disease: less than 60 ml/min/1.73 sq.m. Kidney Failure: less than 15 ml/min/1.73 sq.m. Results valid for patients 18 years and older.  Calcium 01/13/2021 9.6  8.3 - 10.6 mg/dL Final    SARS-CoV-2, NAAT 01/13/2021 Not Detected  Not Detected Final    Comment: Rapid NAAT:   Negative results should be treated as presumptive and,  if inconsistent with clinical signs and symptoms or necessary for  patient management, should be tested with an alternative molecular  assay. Negative results do not preclude SARS-CoV-2 infection and  should not be used as the sole basis for patient management decisions. This test has been authorized by the FDA under an Emergency Use  Authorization (EUA) for use by authorized laboratories. Fact sheet for Healthcare Providers:  Aayush.mckayla  Fact sheet for Patients: Aayush.mckayla    METHODOLOGY: Isothermal Nucleic Acid Amplification          ASSESSMENT / PLAN:    1. Migraine without aura and without status migrainosus, not intractable  Mild flare of sx with normal neuro exam  Cont imitrex prn and treat blood pressure with start CCB therapy  F/u 1 month    2. Hypercholesteremia  Stable with statin therapy    3.  Vision changes  Exam nonfocal  May relate to elevatin of blood pressure  Refer ophth for eval  - External Referral To Ophthalmology    4. Elevated blood pressure reading  Recheck still high, c/w essential HTN  tx as below    5. Mild episode of recurrent major depressive disorder (HCC)  Mood doing ok, seems improved  Discussed consideration of therapy but pt feels doing ok at this time  Cont supportive therapy    6. Seasonal allergic rhinitis, unspecified trigger  Stable w/o flare    7. Hyperglycemia  a1c stable, normal    8. Essential hypertension  pesistently elevated  Start amlodipine 5mg qd  Discussed use, benefit, and side effects of prescribed medications. Barriers to medication compliance addressed. All patient questions answered. Pt voiced understanding. 9. Chronic hand pain, right  - External Referral To Orthopedic Surgery           Follow-up appointment:   1 month/prn    Discussed use, benefit, and side effects of all prescribed medications. Barriers to medication compliance addressed. All patient questions answered. Pt voiced understanding. When applicable, patient's outside records were reviewed through [facility]. The patient has signed appropriate paperworks/consents.

## 2021-05-11 NOTE — TELEPHONE ENCOUNTER
From: Catalina Angel  To: Star Rose MD  Sent: 5/11/2021 4:57 PM EDT  Subject: Non-Urgent Medical Question    Hello,   Well, unfortunately Dr. Kiya Thomas does not take my insurance, so can you give me another referral for hand ortho? Thanks!

## 2021-05-27 ENCOUNTER — OFFICE VISIT (OUTPATIENT)
Dept: ORTHOPEDIC SURGERY | Age: 61
End: 2021-05-27
Payer: COMMERCIAL

## 2021-05-27 VITALS — BODY MASS INDEX: 37.45 KG/M2 | WEIGHT: 233 LBS | HEIGHT: 66 IN

## 2021-05-27 DIAGNOSIS — G89.29 CHRONIC MIDLINE LOW BACK PAIN WITHOUT SCIATICA: ICD-10-CM

## 2021-05-27 DIAGNOSIS — M54.50 CHRONIC MIDLINE LOW BACK PAIN WITHOUT SCIATICA: ICD-10-CM

## 2021-05-27 DIAGNOSIS — M51.36 DDD (DEGENERATIVE DISC DISEASE), LUMBAR: ICD-10-CM

## 2021-05-27 DIAGNOSIS — Z96.641 STATUS POST TOTAL REPLACEMENT OF RIGHT HIP: ICD-10-CM

## 2021-05-27 DIAGNOSIS — M47.816 SPONDYLOSIS OF LUMBAR REGION WITHOUT MYELOPATHY OR RADICULOPATHY: Primary | ICD-10-CM

## 2021-05-27 PROCEDURE — 1036F TOBACCO NON-USER: CPT | Performed by: STUDENT IN AN ORGANIZED HEALTH CARE EDUCATION/TRAINING PROGRAM

## 2021-05-27 PROCEDURE — G8427 DOCREV CUR MEDS BY ELIG CLIN: HCPCS | Performed by: STUDENT IN AN ORGANIZED HEALTH CARE EDUCATION/TRAINING PROGRAM

## 2021-05-27 PROCEDURE — 3017F COLORECTAL CA SCREEN DOC REV: CPT | Performed by: STUDENT IN AN ORGANIZED HEALTH CARE EDUCATION/TRAINING PROGRAM

## 2021-05-27 PROCEDURE — 99204 OFFICE O/P NEW MOD 45 MIN: CPT | Performed by: STUDENT IN AN ORGANIZED HEALTH CARE EDUCATION/TRAINING PROGRAM

## 2021-05-27 PROCEDURE — G8417 CALC BMI ABV UP PARAM F/U: HCPCS | Performed by: STUDENT IN AN ORGANIZED HEALTH CARE EDUCATION/TRAINING PROGRAM

## 2021-05-27 NOTE — PROGRESS NOTES
New Patient: LUMBAR SPINE    Referring Provider:  Enmanuel Sosa MD    CHIEF COMPLAINT:    Chief Complaint   Patient presents with    Lower Back Pain     January 2021 she had a right hip replacement and has had pain lower back pain. She has had lower back pain since her 20's and has had therapy and dry needling. No treatment for current pain. Referred by Dr. Armaan Mahajan:    Ms. Sandy Horvath  is a pleasant 61 y.o. female with past history of back pain here for consultation regarding exacerbation of her LBP. She states her pain began after her right hip replacement about 5 months ago. Her pain has remained consistent since then. She rates her back pain 4/10. She describes the pain as sharp that is worse with standing, changing positions, leaning forward, ling down and better with witting and walking while leaning on a grocery cart. She states she feels that her back catches and gets sharp pain when returning back to upright from a bent over position. Pain does not radiate. She denies numbness and tingling. She denies weakness and bowel or bladder dysfunction. She states she can sit for a maximum of 30 and stand for a maximum 5. The pain does disrupt her sleep.      Current/Past Treatment:   · Physical Therapy: Yes--for past back pain and it was helpful; has also had dry needling that was beneficial   · Chiropractic:  No   · Injection:  No   · Medications:    · NSAIDS: Ibuprofen-- is unable to take meloxicam or diclofenac due to idopathic leg edema  · Muscle relaxer:  Yes-- no relief of symptoms in past  · Steriods:  Yes-- great relief of symtpoms; PCP has requested she avoid steriods due to prediabetes  · Neuropathic medications:  NONE  · Opioids: Yes-- good relief of symptoms  · Other: Tylenol  ·  Surgery/Consult NONE    Past Medical History:   Past Medical History:   Diagnosis Date    Acid reflux     Allergic rhinitis, cause unspecified     Arthritis     Attention deficit disorder release tablet, TAKE 1 TABLET BY MOUTH TWICE A DAY, Disp: 180 tablet, Rfl: 3    atorvastatin (LIPITOR) 20 MG tablet, TAKE 1 TABLET BY MOUTH EVERY DAY, Disp: 90 tablet, Rfl: 3    Famotidine-Ca Carb-Mag Hydrox (PEPCID COMPLETE) -165 MG CHEW, Take by mouth daily as needed, Disp: , Rfl:     acetaminophen (TYLENOL) 650 MG extended release tablet, Take 1,300 mg by mouth 2 times daily as needed , Disp: , Rfl:     clotrimazole-betamethasone (LOTRISONE) 1-0.05 % cream, Apply topically 2 times daily. , Disp: 45 g, Rfl: 0    NONFORMULARY, Take 1 tablet by mouth nightly as needed Indications: Hylands leg cramps, Disp: , Rfl:   Allergies:  Chocolate, Flexeril [cyclobenzaprine], Levaquin [levofloxacin in d5w], and Nickel  Social History:    reports that she quit smoking about 29 years ago. Her smoking use included cigarettes. She has a 16.00 pack-year smoking history. She has never used smokeless tobacco. She reports that she does not drink alcohol and does not use drugs.   Family History:   Family History   Problem Relation Age of Onset    Brain Cancer Mother 58    Hypertension Mother     Osteoarthritis Brother 67    High Cholesterol Brother     Osteoporosis Sister 71    High Cholesterol Sister     Hyperparathyroidism  Sister     Hypertension Father 61    Coronary Art Dis Father     High Cholesterol Father     High Cholesterol Brother 79    Osteoarthritis Brother     Prostate Cancer Brother     Hypertension Brother 62    Bipolar Disorder Brother     Substance Abuse Brother     Alcohol Abuse Brother     Hypertension Brother 72    Diabetes type 2  Brother     Prostate Cancer Brother     Osteoarthritis Brother     Osteoarthritis Brother 59    Atrial Fibrillation Sister 61    Osteoarthritis Sister     Hypertension Sister     Kidney Disease Sister     Diabetes type 2  Sister     Osteoarthritis Brother 64    Diabetes type 2  Brother     Ulcerative Colitis Brother     Bipolar Disorder Brother  Cancer Brother        REVIEW OF SYSTEMS: Full ROS noted & scanned   CONSTITUTIONAL: Denies unexplained weight loss, fevers, chills or fatigue  NEUROLOGICAL: Denies unsteady gait or progressive weakness  MUSCULOSKELETAL: Denies joint swelling or redness  PSYCHOLOGICAL: Denies anxiety, depression   SKIN: Denies skin changes, delayed healing, rash, itching   HEMATOLOGIC: Denies easy bleeding or bruising  ENDOCRINE: Denies excessive thirst, urination, heat/cold  RESPIRATORY: Denies current dyspnea, cough  GI: Denies nausea, vomiting, diarrhea   : Denies bowel or bladder issues      PHYSICAL EXAM:    Vitals: Height 5' 6\" (1.676 m), weight 233 lb (105.7 kg), last menstrual period 08/26/2019, not currently breastfeeding. GENERAL EXAM:  · General Apparence: Patient is adequately groomed with no evidence of malnutrition. · Orientation: The patient is oriented to time, place and person. · Mood & Affect:The patient's mood and affect are appropriate. · Vascular: Examination reveals no swelling tenderness in upper or lower extremities. Good capillary refill. · Lymphatic: The lymphatic examination bilaterally reveals all areas to be without enlargement or induration  · Sensation: Sensation is intact without deficit  · Coordination/Balance: Good coordination     CERVICAL EXAMINATION:  · Inspection: Local inspection shows no step-off or bruising. Cervical alignment is normal.     · Palpation: No evidence of tenderness at the midline, and trapezius. Paraspinal tenderness is not present. There is no step-off or paraspinal spasm. · Range of Motion: Range of Motion:  pain-free ROM   · Strength: 5/5 bilateral upper extremities   · Special Tests:    ·   Spurling's, L'Hermitte's & Mills's negative bilaterally. ·   Franz and Impingement tests are negative bilaterally. ·  Cubital and Carpal tunnel Tinel's negative bilaterally.       · Skin:There are no rashes, ulcerations or lesions in right & left upper extremities. · Reflexes: Bilaterally triceps, biceps and brachioradialis are 2+. Clonus absent bilaterally at the feet. · Additional Examinations:       · RIGHT UPPER EXTREMITY:  Inspection/examination of the right upper extremity does not show any tenderness, deformity or injury. Range of motion is full. There is no gross instability. There are no rashes, ulcerations or lesions. Strength and tone are normal.  · LEFT UPPER EXTREMITY: Inspection/examination of the left upper extremity does not show any tenderness, deformity or injury. Range of motion is full. There is no gross instability. There are no rashes, ulcerations or lesions. Strength and tone are normal.    LUMBAR/SACRAL EXAMINATION:  · Inspection: Local inspection shows no step-off or bruising. Lumbar alignment is normal.  Sagittal and Coronal balance is neutral.      · Palpation:   No evidence of tenderness at the midline. Tenderness at the right paraspinal.  No tenderness at trochanters. There is no step-off or paraspinal spasm. · Range of Motion: painful with facet loading with extension and rotation to the right     · Hip ER and IR are pain free and within normal limits. · Strength:   Strength testing is 5/5 in all muscle groups tested. · Special Tests:   Straight leg raise and crossed SLR negative. Slump test negative, Leg length and pelvis level. · Skin: There are no rashes, ulcerations or lesions. · Reflexes: Reflexes are symmetrically 2+ at the patellar and ankle tendons. Clonus absent bilaterally at the feet. · Gait & station: normal, patient ambulates without assistance  · Additional Examinations:   · RIGHT LOWER EXTREMITY: Inspection/examination of the right lower extremity does not show any tenderness, deformity or injury. Range of motion is unremarkable. There is no gross instability. There are no rashes, ulcerations or lesions.  Strength and tone are normal.  · LEFT LOWER EXTREMITY:  Inspection/examination of the left lower extremity does not show any tenderness, deformity or injury. Range of motion is unremarkable. There is no gross instability. There are no rashes, ulcerations or lesions. Strength and tone are normal.    Diagnostic Testing:    Reviewed AP and lateral XR Lumbar Spine taken today in the office: 5 lumbar type vertebrae, Evidence of DDD at L1-2 and L2-3 and L5-S1. Anterolisthesis at L5-S1. Moderate arthritis of facet joints throughout lumbar spine. Impression:    Diagnosis Orders   1. Spondylosis of lumbar region without myelopathy or radiculopathy  [facility]R PT - [facility]   2. DDD (degenerative disc disease), lumbar  [facility] PT - [facility]   3. Status post total replacement of right hip  [facility] PT - [facility]   4. Chronic midline low back pain without sciatica  XR LUMBAR SPINE (2-3 VIEWS)         Plan:    Above diagnoses are Worsening    1. Medications: Adverse effects to meloxicam and diclofenac. The patient will continue ibuprofen as needed. She will try diclofenac gel as well. 2. PT:  I will start the patient on a trial of PT to work on a lumbar stabilization program to focus on core strengthening, core stabilizing, lumbar stretches, hamstring flexibility, modalities as indicated for 6-8 visits over the next 4-6 weeks. OK to include dry needling. 3. Further studies: We will consider an MRI of the lumbar spine if no improvement with a trial of conservative therapy. 4. Interventional:  We discussed possible diagnosis and treatment of lumbar medial branch blocks followed by radiofrequency ablation. Ashley GRESHAM, personally performed the services described in this documentation as scribed by JORDAN Albert, in my presence and it is both accurate and complete.       Venu Mooney PA-C  Board Certified by the [employer] on Certification of [address] [facility]  Partner of [facility])

## 2021-06-01 ENCOUNTER — OFFICE VISIT (OUTPATIENT)
Dept: ORTHOPEDIC SURGERY | Age: 61
End: 2021-06-01
Payer: COMMERCIAL

## 2021-06-01 VITALS — BODY MASS INDEX: 36.96 KG/M2 | RESPIRATION RATE: 16 BRPM | WEIGHT: 230 LBS | HEIGHT: 66 IN

## 2021-06-01 DIAGNOSIS — M18.0 ARTHRITIS OF CARPOMETACARPAL (CMC) JOINT OF BOTH THUMBS: Primary | ICD-10-CM

## 2021-06-01 PROCEDURE — 99213 OFFICE O/P EST LOW 20 MIN: CPT | Performed by: ORTHOPAEDIC SURGERY

## 2021-06-01 PROCEDURE — G8417 CALC BMI ABV UP PARAM F/U: HCPCS | Performed by: ORTHOPAEDIC SURGERY

## 2021-06-01 PROCEDURE — G8427 DOCREV CUR MEDS BY ELIG CLIN: HCPCS | Performed by: ORTHOPAEDIC SURGERY

## 2021-06-01 PROCEDURE — 1036F TOBACCO NON-USER: CPT | Performed by: ORTHOPAEDIC SURGERY

## 2021-06-01 PROCEDURE — 3017F COLORECTAL CA SCREEN DOC REV: CPT | Performed by: ORTHOPAEDIC SURGERY

## 2021-06-01 NOTE — PROGRESS NOTES
This 61 y.o. 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 This has been present for several years. She has seen Dr. Harland Kocher (2019) and has been treated with ibuprofen and a rigid brace. There is mild pain at rest.  Pain is aggrevated by movement, pinching and gripping. Symptoms have not changed recently. There is no mechanical snapping or popping on movement. There is no history of injury or chronic overuse. Arthritic symptoms are sometimes noticed in other joints. There is a family history of arthritis. Proprietary pain/ antiinflammatory medications, in small doses, partially relieve symptoms. The pain assessment has been reviewed and is correct as stated. The patient's social history, past medical history, family history, medications, allergies and review of systems, entered 6/1/21,  have been reviewed, and dated and are recorded in the chart. On physical examination the patient is Height: 5' 6\" (167.6 cm), Weight: 230 lb (104.3 kg),  Resp: 16 per minute. The patient is well nourished, is oriented to time and place, demonstrates appropriate mood and affect as well as normal gait and station. There are deformities of the DIP joints of multiple fingers consistent with osteoarthritis. There is soft tissue swelling and bony thickening over the basilar joint areas of the thumbs. There is tenderness to palpation over the base of the thumb metacarpals, R>L  The grind test is positive. On testing range of motion, there is a limitation of few degrees of flexion, extension and retroposition. There is no thickening or tenderness over the flexor tendon sheath. There is no tenderness over the first dorsal compartment. The [place] test is negative. Skin is intact, as is distal circulation and sensation. Gross muscle strength is normal bilaterally. Hand and wrist joints are stable.

## 2021-06-02 ENCOUNTER — HOSPITAL ENCOUNTER (OUTPATIENT)
Dept: PHYSICAL THERAPY | Age: 61
Setting detail: THERAPIES SERIES
Discharge: HOME | End: 2021-06-02
Payer: COMMERCIAL

## 2021-06-02 PROCEDURE — 97110 THERAPEUTIC EXERCISES: CPT | Performed by: PHYSICAL THERAPIST

## 2021-06-02 PROCEDURE — 97140 MANUAL THERAPY 1/> REGIONS: CPT | Performed by: PHYSICAL THERAPIST

## 2021-06-02 PROCEDURE — 97161 PT EVAL LOW COMPLEX 20 MIN: CPT | Performed by: PHYSICAL THERAPIST

## 2021-06-02 NOTE — FLOWSHEET NOTE
[address] [facility]    [facility] Therapy Treatment Note/ Progress Report:   Date:  2021    Patient Name:  Sandy Horvath    :  1960  MRN: [id number]  Restrictions/Precautions:    Medical/Treatment Diagnosis Information:  · Diagnosis: M51.36 (ICD-10-CM) - DDD (degenerative disc disease), lumbar M47.816 (ICD-10-CM) - Spondylosis of lumbar region without myelopathy or radiculopathy  · Treatment Diagnosis: PT treatment diagnosis:  low back pain  Z02.7  Insurance/Certification information:  PT Insurance Information: Viki Hidalgo  Physician Information:  Referring Practitioner: JAZMYN Trivedi  Plan of care signed (Y/N):     Date of Patient follow up with Physician:     Is this a Progress Report:     []  Yes  [x]  No        If Yes:  Date Range for reporting period:  Beginning  Ending    Progress report will be due (10 Rx or 30 days whichever is less): 78       Recertification will be due (POC Duration  / 90 days whichever is less): 21     Date of Surgery:        Visit # Insurance Allowable Auth Required    20 []  Yes []  No        Functional Scale:     Date assessed:        Latex Allergy:  [x]NO      []YES  Preferred Language for Healthcare:   [x]English       []other    Pain level:  4-7/10     SUBJECTIVE:  See eval    Type: []Constant   []Intermitment  []Radiating []Localized  []other:     Functional Limitations: []Sitting []Standing []Walking    []Squatting []Stairs                []ADL's  []Driving []Sports/Recreation   []Lifting/reaching     []Grooming    []Carrying []Driving  []Sports/Recreations   []Other:      OBJECTIVE:     ROM Current (R) Current (L)                       Strength                           Gait:     Joint mobility:    []Normal    []Hypo   []Hyper    Palpation:     Orthopedic Tests:     RESTRICTIONS/PRECAUTIONS: R THR (posterior approach) , HBP, OA    Exercises/Interventions:         Stretching Repetitions/Resistance Nots/Last Progression   [facility]     Piriformis Cross Over     Figure 4 Piriformis      Supine ITB     HS stretch 3x30'' B standing   Prone Quad Stretch     Prayer Stretch     Hand Heel Rock     Cat/Cow     LTR 5x10'' B                   Exercises          Prone glut squeeze 15x5''    Bridge  2x10    SLR Flex     SLR Abd     SLR Ext     Clamshells     TA / Multifidus / Abd Hollow 20x5''    TA March     Prone Plank     Side Plank     Quadriped UE/ LE/ Birddog     Squats     [employer] Kick                 Manual      Hamstring Stretch     [place]     Piriformis Stretch      ITB Stretch      Prone Quad Stretch      Traction     STM-ball roll 5'    Sacral Decompression 2'    Lumbar PA Grade-  8'    Supine Lumbar Roll     SL Lumbar      Long Axis Hip Distraction Grade     Access Code: [email or web address]  URL: [email or web address]  Date: 06/02/2021  Prepared by: Walker Riggins    Exercises  Prone Gluteal Sets - 1-2 x daily - 7 x weekly - 15 reps - 5 seconds hold  Supine Lower Trunk Rotation - 1-2 x daily - 7 x weekly - 5 reps - 10 seconds hold  Supine Posterior Pelvic Tilt - 1-2 x daily - 7 x weekly - 20 reps - 5 seconds hold  Supine Bridge - 1-2 x daily - 7 x weekly - 20 reps - 3 seconds hold  Standing Hamstring Stretch with Step - 1-2 x daily - 7 x weekly - 3 reps - 30 seconds hold    Therapeutic Exercise and NMR EXR  [] (41259) Provided verbal/tactile cueing for activities related to strengthening, flexibility, endurance, ROM  for improvements in proximal hip and core control with self care, mobility, lifting and ambulation.  [] (31339) Provided verbal/tactile cueing for activities related to improving balance, coordination, kinesthetic sense, posture, motor skill, proprioception  to assist with core control in self care, mobility, lifting, and ambulation.      Therapeutic Activities:    [] (12308 or 05972) Provided verbal/tactile cueing for activities related to improving balance, coordination, kinesthetic sense, posture, motor skill, proprioception and motor activation to allow for proper function  with self care and ADLs  [] (94048) Provided training and instruction to the patient for proper core and proximal hip recruitment and positioning with ambulation re-education     Home Exercise Program:    [x] (63419) Reviewed/Progressed HEP activities related to strengthening, flexibility, endurance, ROM of core, proximal hip and LE for functional self-care, mobility, lifting and ambulation   [] (63176) Reviewed/Progressed HEP activities related to improving balance, coordination, kinesthetic sense, posture, motor skill, proprioception of core, proximal hip and LE for self care, mobility, lifting, and ambulation      Manual Treatments:    [] (16264) Provided manual therapy to mobilize proximal hip and LS spine soft tissue/joints for the purpose of modulating pain, promoting relaxation,  increasing ROM, reducing/eliminating soft tissue swelling/inflammation/restriction, improving soft tissue extensibility and allowing for proper ROM for normal function with self care, mobility, lifting and ambulation. Modalities:       Charges:  Timed Code Treatment Minutes: 30   Total Treatment Minutes: 50     [x] EVAL (LOW) 40018 (typically 20 minutes face-to-face)  [] EVAL (MOD) 47456 (typically 30 minutes face-to-face)  [] EVAL (HIGH) 20997 (typically 45 minutes face-to-face)  [] RE-EVAL     [x] PT(42364) x 1     [] IONTO  [] NMR (16829) x     [] VASO  [x] Manual (12054) x  1    [] Other:  [] TA x      [] Mech Traction (38422)  [] ES(attended) (48168)      [] ES (un) (31396):     Goals:   Short Term Goals: To be achieved in: 2 weeks  1. Independent in HEP and progression per patient tolerance, in order to prevent re-injury. [] Progressing: [] Met: [] Not Met: [] Adjusted  2. Patient will have a decrease in pain to facilitate improvement in movement, function, and ADLs as indicated by Functional Deficits.   [] Progressing: [] Met: [] Not Met: [] Adjusted    Long Term Goals: To be achieved in: 4 weeks  1. Disability index score of 15% or less for the EMILY to assist with reaching prior level of function. [] Progressing: [] Met: [] Not Met: [] Adjusted  2. Patient will demonstrate increased AROM to WNL, good LS mobility, good hip ROM to allow for proper joint functioning as indicated by patients Functional Deficits. [] Progressing: [] Met: [] Not Met: [] Adjusted  3. Patient will demonstrate an increase in Strength to good proximal hip and core activation to allow for proper functional mobility as indicated by patients Functional Deficits. [] Progressing: [] Met: [] Not Met: [] Adjusted  4. Patient will return to changing positions/bending forward functional activities without increased symptoms or restriction. [] Progressing: [] Met: [] Not Met: [] Adjusted  5. Patient will return to walking for 20 minutes without restriction.(patient specific functional goal)   [] Progressing: [] Met: [] Not Met: [] Adjusted     Overall Progression Towards Functional goals/ Treatment Progress Update:  [] Patient is progressing as expected towards functional goals listed. [] Progression is slowed due to complexities/Impairments listed. [] Progression has been slowed due to co-morbidities.   [x] Plan just implemented, too soon to assess goals progression <30days   [] Goals require adjustment due to lack of progress  [] Patient is not progressing as expected and requires additional follow up with physician  [] Other    ASSESSMENT:  See eval    Treatment/Activity Tolerance:  [x] Patient tolerated treatment well [] Patient limited by fatique  [] Patient limited by pain  [] Patient limited by other medical complications  [] Other:     Prognosis: [x] Good [] Fair  [] Poor    Patient Requires Follow-up: [x] Yes  [] No    PLAN: See eval  [] Continue per plan of care [] Alter current plan (see comments)  [x] Plan of care initiated [] Hold pending MD visit [] Discharge        Electronically signed by:Oj Hamilton PT, OMT-C      Note: If patient does not return for scheduled/ recommended follow up visits, this note will serve as a discharge from care along with most recent update on progress.

## 2021-06-02 NOTE — PLAN OF CARE
symptoms after having THR in January '21. Low back stiffness when getting out of bed. X-rays: lumbar DDD-multi level. Relevant Medical History:OA, HBP, Vj RC repair, R THR  Functional Disability Index:Mod EMILY-30%      Pain Scale: 4-7/10    Easing factors: ice, ibuprofen    Provocative factors: standing up from a bent over position, twist/turning to R     Night Pain:  On occasion     Type: []Constant   []Intermittent  []Radiating []Localized []other:     Numbness/Tingling: none     Red Flag Symptoms: Denied symptoms associated with more severe pathology    to include loss of bowel and bladder control, fever, chills, nausea, headache, recent weight gain, recent weight loss, night sweats, decreased appetite, fatigue. Functional Limitations/Impairments: []Sitting []Standing []Walking    []Squatting/bending  []Stairs           []ADL's  []Transfers [x]Sports/Recreation []Other:    Occupation/School: retired    Sport/recreational activities:  Camping     Living Status/Prior Level of Function: This patient was independent in ADL's and IADL's prior to onset of symptoms.       OBJECTIVE:       Standing Exam Normal Abnormal N/A Comments   Toe walk   x      Heel Walk x      Pelvic Height x      Fwd Bend- (aberrant juttering or innominate mvmt)- Standing Flexion Test x      Extension x      Sacral Sulcus Test (Side Flexion)  x  R LBP w/ LSB   Trendelenburg  x  (+) on R   Combined Movements x                                  Seated Exam Normal Abnormal N/A Comments   Pelvic Height x      Seated Rotation x      Seated flexion       B hip IR       SLUMP Test x             Supine Exam Normal Abnormal N/A Comments   Hip flexion x      Abduction x      ER   x    IR   x    MG/Iron   x    FADIR   x    SLR x      Crossed SLR       Supine to sit       Supine to Sit Test                            Prone Exam Normal Abnormal N/A Comments   Prone knee bend       Prone hip IR       B Achilles reflex/Pheasant       PA/Spring  x Decreased mobility lumbar spine   Prone Instability test       Sacral Spring/thrust       Femoral Nerve Tension Test                ROM LEFT RIGHT Comments   Lumbar Flex 75%  tight   Lumbar Ext 50%     Side Bend 75% 75% R LBP w/ LSB   Rotation 75% 75%                  ROM LEFT RIGHT Comments   Hip Flexion  90    Hip Abd      Hip ER      Hip IR      Hip Extension      Knee Ext      Knee Flex      Hamstring Flex -35 -45    Piriformis                    Strength LEFT RIGHT Comments   Multifidus      Transverse Ab      Hip Flexors  5    Hip Abductors  4-    Hip Extensors  4-                   Myotomes Normal Abnormal Comments   Hip flexion (L1-L2) x     Knee extension (L2-L4) x     Dorsiflexion (L4-L5) x     Great Toe Ext (L5) x     Ankle Eversion (S1-S2) x     Ankle PF(S1-S2) x         Dermatomes Normal Abnormal Comments   inguinal area (L1)  x     anterior mid-thigh (L2) x     distal ant thigh/med knee (L3) x     medial lower leg and foot (L4) x     lateral lower leg and foot (L5) x     posterior calf (S1) x     medial calcaneus (S2) x           Reflexes Normal Abnormal Comments   S1-2 Seated achilles x     S1-2 Prone knee bend      L3-4 Patellar tendon x     C5-6 Biceps      C6 Brachioradialis      C7-8 Triceps      Clonus x     Babinski      Mills's        Joint mobility: lumbar   []Normal    [x]Hypo   []Hyper    Palpation: Right sided Lumbar 3-5 facets    Functional Mobility/Transfers: WNL    Posture: mild anterior pelvic tilt    Gait: (include devices/WB status) Mild trendelenburg on R    Bandages/Dressings/Incisions: n/a                          [x] Patient history, allergies, meds reviewed. Medical chart reviewed. See intake form. Review Of Systems (ROS):  [x]Performed Review of systems (Integumentary, CardioPulmonary, Neurological) by intake and observation. Intake form has been scanned into medical record.  Patient has been instructed to contact their primary care physician regarding ROS issues if not already being addressed at this time. Co-morbidities/Complexities (which will affect course of rehabilitation):    []None           Arthritic conditions   []Rheumatoid arthritis (M05.9)  [x]Osteoarthritis (M19.91)   Cardiovascular conditions   [x]Hypertension (I10)  []Hyperlipidemia (E78.5)  []Angina pectoris (I20)  []Atherosclerosis (I70)   Musculoskeletal conditions   []Disc pathology   []Congenital spine pathologies   []Prior surgical intervention  []Osteoporosis (M81.8)  []Osteopenia (M85.8)   Endocrine conditions   []Hypothyroid (E03.9)  []Hyperthyroid Gastrointestinal conditions   []Constipation (M94.28)   Metabolic conditions   []Morbid obesity (E66.01)  []Diabetes type 1(E10.65) or 2 (E11.65)   []Neuropathy (G60.9)     Pulmonary conditions   []Asthma (J45)  []Coughing   []COPD (J44.9)   Psychological Disorders  []Anxiety (F41.9)  [x]Depression (F32.9)   []Other:   [x]Other:    R THR '21, Vj. RC repair       Barriers to/and or personal factors that will affect rehab potential:              []Age  []Sex              []Motivation/Lack of Motivation                        [x]Co-Morbidities              []Cognitive Function, education/learning barriers              []Environmental, home barriers              []profession/work barriers  []past PT/medical experience  []other:  Justification:     Falls Risk Assessment (30 days):   [x] Falls Risk assessed and no intervention required.   [] Falls Risk assessed and Patient requires intervention due to being higher risk   TUG score (>12s at risk):     [] Falls education provided, including       ASSESSMENT:   Functional Impairments:     [x]Noted lumbar/proximal hip hypomobility   []Noted lumbosacral and/or generalized hypermobility   [x]Decreased Lumbosacral/hip/LE functional ROM   [x]Decreased core/proximal hip strength and neuromuscular control    []Decreased LE functional strength    []Abnormal reflexes/sensation/myotomal/dermatomal deficits  []Reduced balance/proprioceptive control    []other:      Functional Activity Limitations (from functional questionnaire and intake)   [x]Reduced ability to tolerate prolonged functional positions   [x]Reduced ability or difficulty with changes of positions or transfers between positions   [x]Reduced ability to maintain good posture and demonstrate good body mechanics with sitting, bending, and lifting   [x]Reduced ability to sleep   [] Reduced ability or tolerance with driving and/or computer work   [x]Reduced ability to perform lifting, reaching, carrying tasks   []Reduced ability to squat   [x]Reduced ability to forward bend   [x]Reduced ability to ambulate prolonged functional periods/distances/surfaces   []Reduced ability to ascend/descend stairs   []other:       Participation Restrictions   []Reduced participation in self care activities   [x]Reduced participation in home management activities   []Reduced participation in work activities   []Reduced participation in social activities. [x]Reduced participation in sport/recreation activities. Classification:   []Signs/symptoms consistent with Lumbar instability/stabilization subgroup. []Signs/symptoms consistent with Lumbar mobilization/manipulation subgroup, myotomes and dermatomes intact. Meets manipulation criteria. []Signs/symptoms consistent with Lumbar direction specific/centralization subgroup   []Signs/symptoms consistent with Lumbar traction subgroup     []Signs/symptoms consistent with lumbar facet dysfunction   [x]Signs/symptoms consistent with lumbar stenosis type dysfunction   []Signs/symptoms consistent with nerve root involvement including myotome & dermatome dysfunction   []Signs/symptoms consistent with post-surgical status including: decreased ROM, strength and function.    []signs/symptoms consistent with pathology which may benefit from Dry needling     []other:    Prognosis/Rehab Potential: []Excellent   [x]Good    []Fair   []Poor    Tolerance of evaluation/treatment:    []Excellent   [x]Good    []Fair   []Poor    Physical Therapy Evaluation Complexity Justification  [x] A history of present problem with:  [] no personal factors and/or comorbidities that impact the plan of care;  [x]1-2 personal factors and/or comorbidities that impact the plan of care  []3 personal factors and/or comorbidities that impact the plan of care  [x] An examination of body systems using standardized tests and measures addressing any of the following: body structures and functions (impairments), activity limitations, and/or participation restrictions;:  [] a total of 1-2 or more elements   [] a total of 3 or more elements   [x] a total of 4 or more elements   [x] A clinical presentation with:  [x] stable and/or uncomplicated characteristics   [] evolving clinical presentation with changing characteristics  [] unstable and unpredictable characteristics;   [x] Clinical decision making of [x] low, [] moderate, [] high complexity using standardized patient assessment instrument and/or measurable assessment of functional outcome. [x] EVAL (LOW) 64774 (typically 20 minutes face-to-face)  [] EVAL (MOD) 44416 (typically 30 minutes face-to-face)  [] EVAL (HIGH) 95213 (typically 45 minutes face-to-face)  [] RE-EVAL       PLAN: Begin PT focusing on: proximal hip mobilizations, LB mobs, LB core activation, proximal hip activation, and HEP    Frequency/Duration:  1-2 days per week for 4 Weeks:  Interventions:  1. Therapeutic exercise including: strength training, ROM/flexibility, NMR and proprioception for the proximal upper extremity and deep neck flexors. 1 Therapeutic exercise including: strength training, ROM/flexibility, NMR and proprioception for the core, hips and bilateral lower extremities. 2. Manual therapy as indicated including Dry Needling/IASTM, STM, PROM, Gr I-IV mobilizations, spinal mobilization/manipulation.

## 2021-06-08 ENCOUNTER — HOSPITAL ENCOUNTER (OUTPATIENT)
Dept: PHYSICAL THERAPY | Age: 61
Setting detail: THERAPIES SERIES
Discharge: HOME | End: 2021-06-08
Payer: COMMERCIAL

## 2021-06-08 PROCEDURE — 97112 NEUROMUSCULAR REEDUCATION: CPT | Performed by: PHYSICAL THERAPIST

## 2021-06-08 PROCEDURE — 97110 THERAPEUTIC EXERCISES: CPT | Performed by: PHYSICAL THERAPIST

## 2021-06-08 PROCEDURE — 97140 MANUAL THERAPY 1/> REGIONS: CPT | Performed by: PHYSICAL THERAPIST

## 2021-06-08 NOTE — FLOWSHEET NOTE
The [address] [place] [address] and [facility]    Physical Therapy Treatment Note/ Progress Report:   Date:  2021    Patient Name:  Michael Lunsford    :  1960  MRN: [id number]  Restrictions/Precautions:    Medical/Treatment Diagnosis Information:  · Diagnosis: M51.36 (ICD-10-CM) - DDD (degenerative disc disease), lumbar M47.816 (ICD-10-CM) - Spondylosis of lumbar region without myelopathy or radiculopathy  · Treatment Diagnosis: PT treatment diagnosis:  low back pain  C29.4  Insurance/Certification information:  PT Insurance Information: Louisa Murphy  Physician Information:  Referring Practitioner: JAZMYN Whaley  Plan of care signed (Y/N):     Date of Patient follow up with Physician:     Is this a Progress Report:     []  Yes  [x]  No        If Yes:  Date Range for reporting period:  Beginning  Ending    Progress report will be due (10 Rx or 30 days whichever is less): 19       Recertification will be due (POC Duration  / 90 days whichever is less): 21     Date of Surgery:        Visit # Insurance Allowable Auth Required   2 20 []  Yes []  No        Functional Scale:     Date assessed:        Latex Allergy:  [x]NO      []YES  Preferred Language for Healthcare:   [x]English       []other    Pain level:  4-7/10     SUBJECTIVE:  Reports feeling a lot better since the first visit. No problems noted with HEP.      Type: []Constant   []Intermitment  []Radiating []Localized  []other:     Functional Limitations: []Sitting []Standing []Walking    []Squatting []Stairs                []ADL's  []Driving []Sports/Recreation   []Lifting/reaching     []Grooming    []Carrying []Driving  []Sports/Recreations   []Other:      OBJECTIVE:     ROM Current (R) Current (L)                       Strength                           Gait:     Joint mobility:    []Normal    []Hypo   []Hyper    Palpation:     Orthopedic Tests:     RESTRICTIONS/PRECAUTIONS: R THR (posterior approach) , HBP, OA    Exercises/Interventions:         Stretching Repetitions/Resistance Nots/Last Progression   [facility]     Piriformis Cross Over     Figure 4 Piriformis      Supine ITB     HS stretch 3x30'' B standing   Prone Quad Stretch     Prayer Stretch     Hand Heel Rock     Cat/Cow     LTR 5x10'' B                   Exercises          Prone glut squeeze 15x5''    Bridge  2x10    SLR Flex     SLR Abd 2x10 B    SLR Ext     Clamshells 2x10 B    TA / Multifidus / Abd Hollow 20x5''    TA March 15x B    Ab bracing: circles/ ABCs 6# 20x ea/ 6# 1x    Prone Plank     Side Plank     Quadriped UE/ LE/ Birddog     Squats     [employer] Kick                 Manual      Hamstring Stretch     [facility]     Piriformis Stretch      ITB Stretch      Prone Quad Stretch      Traction     STM-ball roll 5'    Sacral Decompression 2'    Lumbar PA Grade-  8'    Supine Lumbar Roll     SL Lumbar      Long Axis Hip Distraction Grade     Access Code: [email or web address]  URL: [email or web address]  Date: 06/02/2021  Prepared by: John Rosado    Exercises  Prone Gluteal Sets - 1-2 x daily - 7 x weekly - 15 reps - 5 seconds hold  Supine Lower Trunk Rotation - 1-2 x daily - 7 x weekly - 5 reps - 10 seconds hold  Supine Posterior Pelvic Tilt - 1-2 x daily - 7 x weekly - 20 reps - 5 seconds hold  Supine Bridge - 1-2 x daily - 7 x weekly - 20 reps - 3 seconds hold  Standing Hamstring Stretch with Step - 1-2 x daily - 7 x weekly - 3 reps - 30 seconds hold    Therapeutic Exercise and NMR EXR  [] (08567) Provided verbal/tactile cueing for activities related to strengthening, flexibility, endurance, ROM  for improvements in proximal hip and core control with self care, mobility, lifting and ambulation.  [] (40278) Provided verbal/tactile cueing for activities related to improving balance, coordination, kinesthetic sense, posture, motor skill, proprioception  to assist with core control in self care, mobility, lifting, and ambulation.      Therapeutic Prognosis: [x] Good [] Fair  [] Poor    Patient Requires Follow-up: [x] Yes  [] No    PLAN: See eval  [x] Continue per plan of care [] Alter current plan (see comments)  [] Plan of care initiated [] Hold pending MD visit [] Discharge        Electronically signed by:Oj Hamilton PT, OMT-C      Note: If patient does not return for scheduled/ recommended follow up visits, this note will serve as a discharge from care along with most recent update on progress.

## 2021-06-10 ENCOUNTER — HOSPITAL ENCOUNTER (OUTPATIENT)
Dept: PHYSICAL THERAPY | Age: 61
Setting detail: THERAPIES SERIES
Discharge: HOME | End: 2021-06-10
Payer: COMMERCIAL

## 2021-06-10 PROCEDURE — 97140 MANUAL THERAPY 1/> REGIONS: CPT | Performed by: SPECIALIST/TECHNOLOGIST

## 2021-06-10 PROCEDURE — 97112 NEUROMUSCULAR REEDUCATION: CPT | Performed by: SPECIALIST/TECHNOLOGIST

## 2021-06-10 PROCEDURE — 97110 THERAPEUTIC EXERCISES: CPT | Performed by: SPECIALIST/TECHNOLOGIST

## 2021-06-10 NOTE — FLOWSHEET NOTE
OA    Exercises/Interventions:         Stretching Repetitions/Resistance Nots/Last Progression   [facility]     Piriformis Cross Over     Figure 4 Piriformis      Supine ITB     HS stretch 3x30'' B standing   Prone Quad Stretch 3 x 30\" - RLE    Prayer Stretch     Hand Heel Rock     Cat/Cow     LTR 5x10'' B                   Exercises          Prone glut squeeze 15x5''    Bridge  2x10    SLR Flex     SLR Abd 2x10 B    SLR Ext     Clamshells 2x10 B    TA / Multifidus / Abd Hollow 20x5''    TA March 15x B    Ab bracing: circles/ ABCs 6# 20x ea/ 6# 1x    Prone Plank     Side Plank     Quadriped UE/ LE/ Sanya [place]     [employer] Kick                 Manual      Hamstring Stretch     [facility]     Piriformis Stretch      ITB Stretch      Prone Quad Stretch      Traction     STM-ball roll, right paraspinal and hip flexor 8'    Sacral Decompression 2'    Lumbar PA Grade-  5'    Supine Lumbar Roll     SL Lumbar      Long Axis Hip Distraction Grade     Access Code: [email or web address]  URL: [email or web address]  Date: 06/02/2021  Prepared by: Trip Lara    Exercises  Prone Gluteal Sets - 1-2 x daily - 7 x weekly - 15 reps - 5 seconds hold  Supine Lower Trunk Rotation - 1-2 x daily - 7 x weekly - 5 reps - 10 seconds hold  Supine Posterior Pelvic Tilt - 1-2 x daily - 7 x weekly - 20 reps - 5 seconds hold  Supine Bridge - 1-2 x daily - 7 x weekly - 20 reps - 3 seconds hold  Standing Hamstring Stretch with Step - 1-2 x daily - 7 x weekly - 3 reps - 30 seconds hold    Therapeutic Exercise and NMR EXR  [x] (84975) Provided verbal/tactile cueing for activities related to strengthening, flexibility, endurance, ROM  for improvements in proximal hip and core control with self care, mobility, lifting and ambulation.  [] (49624) Provided verbal/tactile cueing for activities related to improving balance, coordination, kinesthetic sense, posture, motor skill, proprioception  to assist with core control in self care, mobility, lifting, and ambulation. Therapeutic Activities:    [] (59952 or 83638) Provided verbal/tactile cueing for activities related to improving balance, coordination, kinesthetic sense, posture, motor skill, proprioception and motor activation to allow for proper function  with self care and ADLs  [] (98325) Provided training and instruction to the patient for proper core and proximal hip recruitment and positioning with ambulation re-education     Home Exercise Program:    [x] (53318) Reviewed/Progressed HEP activities related to strengthening, flexibility, endurance, ROM of core, proximal hip and LE for functional self-care, mobility, lifting and ambulation   [] (94619) Reviewed/Progressed HEP activities related to improving balance, coordination, kinesthetic sense, posture, motor skill, proprioception of core, proximal hip and LE for self care, mobility, lifting, and ambulation      Manual Treatments:    [x] (25282) Provided manual therapy to mobilize proximal hip and LS spine soft tissue/joints for the purpose of modulating pain, promoting relaxation,  increasing ROM, reducing/eliminating soft tissue swelling/inflammation/restriction, improving soft tissue extensibility and allowing for proper ROM for normal function with self care, mobility, lifting and ambulation. Modalities:       Charges:  Timed Code Treatment Minutes: 45   Total Treatment Minutes: 45     [] EVAL (LOW) 63807 (typically 20 minutes face-to-face)  [] EVAL (MOD) 59233 (typically 30 minutes face-to-face)  [] EVAL (HIGH) 10006 (typically 45 minutes face-to-face)  [] RE-EVAL     [x] IK(91690) x 1     [] IONTO  [x] NMR (35236) x 1    [] VASO  [x] Manual (61795) x  1    [] Other:  [] TA x      [] Mech Traction (86365)  [] ES(attended) (00783)      [] ES (un) (68496):     Goals:   Short Term Goals: To be achieved in: 2 weeks  1. Independent in HEP and progression per patient tolerance, in order to prevent re-injury.    [] Progressing: [] Met: [] Not Met: [] Adjusted  2. Patient will have a decrease in pain to facilitate improvement in movement, function, and ADLs as indicated by Functional Deficits. [] Progressing: [] Met: [] Not Met: [] Adjusted    Long Term Goals: To be achieved in: 4 weeks  1. Disability index score of 15% or less for the EMILY to assist with reaching prior level of function. [] Progressing: [] Met: [] Not Met: [] Adjusted  2. Patient will demonstrate increased AROM to WNL, good LS mobility, good hip ROM to allow for proper joint functioning as indicated by patients Functional Deficits. [] Progressing: [] Met: [] Not Met: [] Adjusted  3. Patient will demonstrate an increase in Strength to good proximal hip and core activation to allow for proper functional mobility as indicated by patients Functional Deficits. [] Progressing: [] Met: [] Not Met: [] Adjusted  4. Patient will return to changing positions/bending forward functional activities without increased symptoms or restriction. [] Progressing: [] Met: [] Not Met: [] Adjusted  5. Patient will return to walking for 20 minutes without restriction.(patient specific functional goal)   [] Progressing: [] Met: [] Not Met: [] Adjusted     Overall Progression Towards Functional goals/ Treatment Progress Update:  [] Patient is progressing as expected towards functional goals listed. [] Progression is slowed due to complexities/Impairments listed. [] Progression has been slowed due to co-morbidities. [x] Plan just implemented, too soon to assess goals progression <30days   [] Goals require adjustment due to lack of progress  [] Patient is not progressing as expected and requires additional follow up with physician  [] Other    ASSESSMENT:  Responding well to Rx and tolerated progression well. Fatigued easily with glut strengthening.      Treatment/Activity Tolerance:  [x] Patient tolerated treatment well [] Patient limited by fatique  [] Patient limited by pain  [] Patient limited by other medical complications  [] Other:     Prognosis: [x] Good [] Fair  [] Poor    Patient Requires Follow-up: [x] Yes  [] No    PLAN: See eval  [x] Continue per plan of care [] Alter current plan (see comments)  [] Plan of care initiated [] Hold pending MD visit [] Discharge        Electronically signed by: Elyssa Valencia, PTA  [id number], Ally Wyatt, PT, MPT       Note: If patient does not return for scheduled/ recommended follow up visits, this note will serve as a discharge from care along with most recent update on progress.

## 2021-06-15 ENCOUNTER — HOSPITAL ENCOUNTER (OUTPATIENT)
Dept: PHYSICAL THERAPY | Age: 61
Setting detail: THERAPIES SERIES
Discharge: HOME | End: 2021-06-15
Payer: COMMERCIAL

## 2021-06-15 NOTE — FLOWSHEET NOTE
[facility] [place],[address], [facility]      Physical Therapy  Cancellation/No-show Note  Patient Name:  Sandy Horvath  :  1960   Date:  6/15/2021  Cancelled visits to date: 1  No-shows to date: 0    For today's appointment patient:  [x]  Cancelled  []  Rescheduled appointment  []  No-show     Reason given by patient:  [x]  Patient ill  []  Conflicting appointment  []  No transportation    []  Conflict with work  []  No reason given  []  Other:     Comments:      Electronically signed by:  Trinidad Lee PT, OMT-C

## 2021-06-17 ENCOUNTER — HOSPITAL ENCOUNTER (OUTPATIENT)
Dept: PHYSICAL THERAPY | Age: 61
Setting detail: THERAPIES SERIES
Discharge: HOME | End: 2021-06-17
Payer: COMMERCIAL

## 2021-06-17 PROCEDURE — 97140 MANUAL THERAPY 1/> REGIONS: CPT | Performed by: SPECIALIST/TECHNOLOGIST

## 2021-06-17 PROCEDURE — 97110 THERAPEUTIC EXERCISES: CPT | Performed by: SPECIALIST/TECHNOLOGIST

## 2021-06-17 NOTE — FLOWSHEET NOTE
The [address] [place] [address] and Sports [facility]    Physical Therapy Treatment Note/ Progress Report:   Date:  2021    Patient Name:  Alfredo Bates    :  1960  MRN: [id number]  Restrictions/Precautions:    Medical/Treatment Diagnosis Information:  · Diagnosis: M51.36 (ICD-10-CM) - DDD (degenerative disc disease), lumbar M47.816 (ICD-10-CM) - Spondylosis of lumbar region without myelopathy or radiculopathy  · Treatment Diagnosis: PT treatment diagnosis:  low back pain  W12.9  Insurance/Certification information:  PT Insurance Information: [place]  Physician Information:  Referring Practitioner: JAZMYN Garcia  Plan of care signed (Y/N):     Date of Patient follow up with Physician:     Is this a Progress Report:     []  Yes  [x]  No        If Yes:  Date Range for reporting period:  Beginning  Ending    Progress report will be due (10 Rx or 30 days whichever is less): 71       Recertification will be due (POC Duration  / 90 days whichever is less): 21     Date of Surgery:        Visit # Insurance Allowable Auth Required   4 20 []  Yes []  No        Functional Scale:     Date assessed:        Latex Allergy:  [x]NO      []YES  Preferred Language for Healthcare:   [x]English       []other    Pain level:  2/10     SUBJECTIVE:  Reports her back feels stiff and her right hip has been hurting more.     Type: []Constant   []Intermitment  []Radiating []Localized  []other:     Functional Limitations: []Sitting []Standing []Walking    []Squatting []Stairs                []ADL's  []Driving []Sports/Recreation   []Lifting/reaching     []Grooming    []Carrying []Driving  []Sports/Recreations   []Other:      OBJECTIVE:     ROM Current (R) Current (L)                       Strength                           Gait:     Joint mobility:    []Normal    []Hypo   []Hyper    Palpation:     Orthopedic Tests:     RESTRICTIONS/PRECAUTIONS: R THR (posterior approach) , HBP, OA    Exercises/Interventions:         Stretching Repetitions/Resistance Nots/Last Progression   bike 5'    DKC     Piriformis Cross Over     Figure 4 Piriformis      Supine ITB     HS stretch 3x30'' B Seated    Prone Quad Stretch 3 x 30\" - RLE    Prayer Stretch     Hand Heel Rock     Cat/Cow     LTR 8O53'' B                   Exercises          Prone glut squeeze 20x5''    Bridge  2x10    SLR Flex     SLR Abd 2x10 B NV   SLR Ext NV    Clamshells 2x10 B NV   TA / Multifidus / Abd Hollow 20x5''    TA March 15x B    Ab bracing: circles/ ABCs 6# 20x ea/ 6# 1x NV   Prone Plank     Side Plank     Quadriped UE/ LE/ Sanya [place]     [employer] Kick                 Manual      Hamstring Stretch     SKC     Piriformis Stretch      ITB Stretch      Cupping - static 8'  bilateral paraspinals   Traction     STM-ball roll, right paraspinal and hip flexor 10    Sacral Decompression 2'    Lumbar PA Grade-  5'    Supine Lumbar Roll     SL Lumbar      Long Axis Hip Distraction Grade     Access Code: [email or web address]  URL: [email or web address]  Date: 06/02/2021  Prepared by: Mónica Jc    Exercises  Prone Gluteal Sets - 1-2 x daily - 7 x weekly - 15 reps - 5 seconds hold  Supine Lower Trunk Rotation - 1-2 x daily - 7 x weekly - 5 reps - 10 seconds hold  Supine Posterior Pelvic Tilt - 1-2 x daily - 7 x weekly - 20 reps - 5 seconds hold  Supine Bridge - 1-2 x daily - 7 x weekly - 20 reps - 3 seconds hold  Standing Hamstring Stretch with Step - 1-2 x daily - 7 x weekly - 3 reps - 30 seconds hold    Therapeutic Exercise and NMR EXR  [x] (82670) Provided verbal/tactile cueing for activities related to strengthening, flexibility, endurance, ROM  for improvements in proximal hip and core control with self care, mobility, lifting and ambulation.  [] (43942) Provided verbal/tactile cueing for activities related to improving balance, coordination, kinesthetic sense, posture, motor skill, proprioception  to assist with core control in self care, mobility, lifting, and ambulation. Therapeutic Activities:    [] (29250 or 15493) Provided verbal/tactile cueing for activities related to improving balance, coordination, kinesthetic sense, posture, motor skill, proprioception and motor activation to allow for proper function  with self care and ADLs  [] (28784) Provided training and instruction to the patient for proper core and proximal hip recruitment and positioning with ambulation re-education     Home Exercise Program:    [x] (23391) Reviewed/Progressed HEP activities related to strengthening, flexibility, endurance, ROM of core, proximal hip and LE for functional self-care, mobility, lifting and ambulation   [] (84643) Reviewed/Progressed HEP activities related to improving balance, coordination, kinesthetic sense, posture, motor skill, proprioception of core, proximal hip and LE for self care, mobility, lifting, and ambulation      Manual Treatments:    [x] (57147) Provided manual therapy to mobilize proximal hip and LS spine soft tissue/joints for the purpose of modulating pain, promoting relaxation,  increasing ROM, reducing/eliminating soft tissue swelling/inflammation/restriction, improving soft tissue extensibility and allowing for proper ROM for normal function with self care, mobility, lifting and ambulation. Modalities:   CP at home    Charges:  Timed Code Treatment Minutes: 45   Total Treatment Minutes: 45     [] EVAL (LOW) 90045 (typically 20 minutes face-to-face)  [] EVAL (MOD) 58841 (typically 30 minutes face-to-face)  [] EVAL (HIGH) 70812 (typically 45 minutes face-to-face)  [] RE-EVAL     [x] EC(73360) x 1     [] IONTO  [x] NMR (47573) x     [] VASO  [x] Manual (16801) x  2   [] Other:  [] TA x      [] Mech Traction (65963)  [] ES(attended) (31877)      [] ES (un) (80612):     Goals:   Short Term Goals: To be achieved in: 2 weeks  1.  Independent in HEP and progression per patient tolerance, in order to prevent re-injury. [] Progressing: [] Met: [] Not Met: [] Adjusted  2. Patient will have a decrease in pain to facilitate improvement in movement, function, and ADLs as indicated by Functional Deficits. [] Progressing: [] Met: [] Not Met: [] Adjusted    Long Term Goals: To be achieved in: 4 weeks  1. Disability index score of 15% or less for the EMILY to assist with reaching prior level of function. [] Progressing: [] Met: [] Not Met: [] Adjusted  2. Patient will demonstrate increased AROM to WNL, good LS mobility, good hip ROM to allow for proper joint functioning as indicated by patients Functional Deficits. [] Progressing: [] Met: [] Not Met: [] Adjusted  3. Patient will demonstrate an increase in Strength to good proximal hip and core activation to allow for proper functional mobility as indicated by patients Functional Deficits. [] Progressing: [] Met: [] Not Met: [] Adjusted  4. Patient will return to changing positions/bending forward functional activities without increased symptoms or restriction. [] Progressing: [] Met: [] Not Met: [] Adjusted  5. Patient will return to walking for 20 minutes without restriction.(patient specific functional goal)   [] Progressing: [] Met: [] Not Met: [] Adjusted     Overall Progression Towards Functional goals/ Treatment Progress Update:  [] Patient is progressing as expected towards functional goals listed. [] Progression is slowed due to complexities/Impairments listed. [] Progression has been slowed due to co-morbidities. [x] Plan just implemented, too soon to assess goals progression <30days   [] Goals require adjustment due to lack of progress  [] Patient is not progressing as expected and requires additional follow up with physician  [] Other    ASSESSMENT:  Responding well to Rx and tolerated progression well. Fatigued easily with glut strengthening.      Treatment/Activity Tolerance:  [x] Patient tolerated treatment well [] Patient limited by jarvis  [] Patient limited by pain  [] Patient limited by other medical complications  [] Other:     Prognosis: [x] Good [] Fair  [] Poor    Patient Requires Follow-up: [x] Yes  [] No    PLAN: See eval  [x] Continue per plan of care [] Alter current plan (see comments)  [] Plan of care initiated [] Hold pending MD visit [] Discharge        Electronically signed by: Earle Crespo, PTA  [id number], Brice Lee, PT, MPT       Note: If patient does not return for scheduled/ recommended follow up visits, this note will serve as a discharge from care along with most recent update on progress.

## 2021-06-22 ENCOUNTER — HOSPITAL ENCOUNTER (OUTPATIENT)
Dept: PHYSICAL THERAPY | Age: 61
Setting detail: THERAPIES SERIES
Discharge: HOME | End: 2021-06-22
Payer: COMMERCIAL

## 2021-06-22 PROCEDURE — 97140 MANUAL THERAPY 1/> REGIONS: CPT | Performed by: PHYSICAL THERAPIST

## 2021-06-22 PROCEDURE — 97112 NEUROMUSCULAR REEDUCATION: CPT | Performed by: PHYSICAL THERAPIST

## 2021-06-22 PROCEDURE — 97110 THERAPEUTIC EXERCISES: CPT | Performed by: PHYSICAL THERAPIST

## 2021-06-22 NOTE — FLOWSHEET NOTE
The [address] [place] [address] and Sports Rehabilitation, [facility]    Physical Therapy Treatment Note/ Progress Report:   Date:  2021    Patient Name:  Katie Nicholas    :  1960  MRN: [id number]  Restrictions/Precautions:    Medical/Treatment Diagnosis Information:  · Diagnosis: M51.36 (ICD-10-CM) - DDD (degenerative disc disease), lumbar M47.816 (ICD-10-CM) - Spondylosis of lumbar region without myelopathy or radiculopathy  · Treatment Diagnosis: PT treatment diagnosis:  low back pain  L64.4  Insurance/Certification information:  PT Insurance Information: Arleen Cummins  Physician Information:  Referring Practitioner: JAZMYN Moya  Plan of care signed (Y/N):     Date of Patient follow up with Physician:     Is this a Progress Report:     []  Yes  [x]  No        If Yes:  Date Range for reporting period:  Beginning  Ending    Progress report will be due (10 Rx or 30 days whichever is less): [birth year]       Recertification will be due (POC Duration  / 90 days whichever is less): 21     Date of Surgery:        Visit # Insurance Allowable Auth Required   5 20 []  Yes []  No        Functional Scale:     Date assessed:        Latex Allergy:  [x]NO      []YES  Preferred Language for Healthcare:   [x]English       []other    Pain level:  2/10     SUBJECTIVE:  Patient states she was clearing out her home office over the weekend and feels she over done it. Low back and R groin has been very achy the past couple of days.      Type: []Constant   []Intermitment  []Radiating []Localized  []other:     Functional Limitations: []Sitting []Standing []Walking    []Squatting []Stairs                []ADL's  []Driving []Sports/Recreation   []Lifting/reaching     []Grooming    []Carrying []Driving  []Sports/Recreations   []Other:      OBJECTIVE:     ROM Current (R) Current (L)                       Strength                           Gait:     Joint mobility:    []Normal    []Hypo   []Hyper    Palpation:     Orthopedic Tests: RESTRICTIONS/PRECAUTIONS: R THR (posterior approach) '21, HBP, OA    Exercises/Interventions:         Stretching Repetitions/Resistance Nots/Last Progression   bike 5'    DKC     Piriformis Cross Over     Figure 4 Piriformis      Supine ITB     HS stretch 3x30'' B Seated    Prone Quad Stretch 3 x 30\" - RLE    Prayer Stretch     Hand Heel Rock     Cat/Cow     LTR 0W45'' B                   Exercises          Prone glut squeeze 20x5''    Bridge  2x10    SLR Flex     SLR Abd 3x10 B    SLR Ext NV    Clamshells 2x10 B    TA / Multifidus / Abd Hollow 20x5''    TA March 15x B    Ab bracing: circles/ ABCs 6# 20x ea/ 6# 1x    Prone Plank     Side Plank     Quadriped UE/ LE/ Sanya [place]     [employer] Kick                 Manual      Hamstring Stretch     SKC     Piriformis Stretch      ITB Stretch      Cupping - static Traction 3' gentle   STM-ball roll, right paraspinal and hip flexor 10    Sacral Decompression 5'    Lumbar PA Grade-  5'    Supine Lumbar Roll     SL Lumbar      Long Axis Hip Distraction Grade     Access Code: [email or web address]  URL: [email or web address]  Date: 06/02/2021  Prepared by: Mikayla Most    Exercises  Prone Gluteal Sets - 1-2 x daily - 7 x weekly - 15 reps - 5 seconds hold  Supine Lower Trunk Rotation - 1-2 x daily - 7 x weekly - 5 reps - 10 seconds hold  Supine Posterior Pelvic Tilt - 1-2 x daily - 7 x weekly - 20 reps - 5 seconds hold  Supine Bridge - 1-2 x daily - 7 x weekly - 20 reps - 3 seconds hold  Standing Hamstring Stretch with Step - 1-2 x daily - 7 x weekly - 3 reps - 30 seconds hold    Therapeutic Exercise and NMR EXR  [x] (11869) Provided verbal/tactile cueing for activities related to strengthening, flexibility, endurance, ROM  for improvements in proximal hip and core control with self care, mobility, lifting and ambulation.  [] (19302) Provided verbal/tactile cueing for activities related to improving balance, coordination, kinesthetic sense, posture, motor skill, proprioception  to assist with core control in self care, mobility, lifting, and ambulation. Therapeutic Activities:    [] (03364 or 23066) Provided verbal/tactile cueing for activities related to improving balance, coordination, kinesthetic sense, posture, motor skill, proprioception and motor activation to allow for proper function  with self care and ADLs  [] (29136) Provided training and instruction to the patient for proper core and proximal hip recruitment and positioning with ambulation re-education     Home Exercise Program:    [x] (43830) Reviewed/Progressed HEP activities related to strengthening, flexibility, endurance, ROM of core, proximal hip and LE for functional self-care, mobility, lifting and ambulation   [] (78598) Reviewed/Progressed HEP activities related to improving balance, coordination, kinesthetic sense, posture, motor skill, proprioception of core, proximal hip and LE for self care, mobility, lifting, and ambulation      Manual Treatments:    [x] (18461) Provided manual therapy to mobilize proximal hip and LS spine soft tissue/joints for the purpose of modulating pain, promoting relaxation,  increasing ROM, reducing/eliminating soft tissue swelling/inflammation/restriction, improving soft tissue extensibility and allowing for proper ROM for normal function with self care, mobility, lifting and ambulation. Modalities:   CP at home    Charges:  Timed Code Treatment Minutes: 45   Total Treatment Minutes: 45     [] EVAL (LOW) 90044 (typically 20 minutes face-to-face)  [] EVAL (MOD) 00552 (typically 30 minutes face-to-face)  [] EVAL (HIGH) 63796 (typically 45 minutes face-to-face)  [] RE-EVAL     [x] RP(09145) x 1     [] IONTO  [x] NMR (07850) x     [] VASO  [x] Manual (59432) x  2   [] Other:  [] TA x      [] Mech Traction (13721)  [] ES(attended) (55527)      [] ES (un) (57521):     Goals:   Short Term Goals: To be achieved in: 2 weeks  1.  Independent in HEP and progression per patient tolerance, in order to prevent re-injury. [] Progressing: [] Met: [] Not Met: [] Adjusted  2. Patient will have a decrease in pain to facilitate improvement in movement, function, and ADLs as indicated by Functional Deficits. [] Progressing: [] Met: [] Not Met: [] Adjusted    Long Term Goals: To be achieved in: 4 weeks  1. Disability index score of 15% or less for the EMILY to assist with reaching prior level of function. [] Progressing: [] Met: [] Not Met: [] Adjusted  2. Patient will demonstrate increased AROM to WNL, good LS mobility, good hip ROM to allow for proper joint functioning as indicated by patients Functional Deficits. [] Progressing: [] Met: [] Not Met: [] Adjusted  3. Patient will demonstrate an increase in Strength to good proximal hip and core activation to allow for proper functional mobility as indicated by patients Functional Deficits. [] Progressing: [] Met: [] Not Met: [] Adjusted  4. Patient will return to changing positions/bending forward functional activities without increased symptoms or restriction. [] Progressing: [] Met: [] Not Met: [] Adjusted  5. Patient will return to walking for 20 minutes without restriction.(patient specific functional goal)   [] Progressing: [] Met: [] Not Met: [] Adjusted     Overall Progression Towards Functional goals/ Treatment Progress Update:  [] Patient is progressing as expected towards functional goals listed. [] Progression is slowed due to complexities/Impairments listed. [] Progression has been slowed due to co-morbidities. [x] Plan just implemented, too soon to assess goals progression <30days   [] Goals require adjustment due to lack of progress  [] Patient is not progressing as expected and requires additional follow up with physician  [] Other    ASSESSMENT:  Responding well to Rx and tolerated progression well. Fatigued easily with glut strengthening.      Treatment/Activity Tolerance:  [x] Patient tolerated treatment well [] Patient limited by fatique  [] Patient limited by pain  [] Patient limited by other medical complications  [] Other:     Prognosis: [x] Good [] Fair  [] Poor    Patient Requires Follow-up: [x] Yes  [] No    PLAN: See eval  [x] Continue per plan of care [] Alter current plan (see comments)  [] Plan of care initiated [] Hold pending MD visit [] Discharge        Electronically signed by: Trinidad Lee PT, OMT-C        Note: If patient does not return for scheduled/ recommended follow up visits, this note will serve as a discharge from care along with most recent update on progress.

## 2021-06-24 ENCOUNTER — HOSPITAL ENCOUNTER (OUTPATIENT)
Dept: PHYSICAL THERAPY | Age: 61
Setting detail: THERAPIES SERIES
Discharge: HOME | End: 2021-06-24
Payer: COMMERCIAL

## 2021-06-24 PROCEDURE — 97140 MANUAL THERAPY 1/> REGIONS: CPT | Performed by: SPECIALIST/TECHNOLOGIST

## 2021-06-24 PROCEDURE — 97110 THERAPEUTIC EXERCISES: CPT | Performed by: SPECIALIST/TECHNOLOGIST

## 2021-06-24 NOTE — FLOWSHEET NOTE
[facility] [address] [place] [address] [facility]    Physical Therapy Treatment Note/ Progress Report:   Date:  2021    Patient Name:  Naye Smith    :  1960  MRN: [id number]  Restrictions/Precautions:    Medical/Treatment Diagnosis Information:  · Diagnosis: M51.36 (ICD-10-CM) - DDD (degenerative disc disease), lumbar M47.816 (ICD-10-CM) - Spondylosis of lumbar region without myelopathy or radiculopathy  · Treatment Diagnosis: PT treatment diagnosis:  low back pain  X97.4  Insurance/Certification information:  PT Insurance Information: Doree Mcardle  Physician Information:  Referring Practitioner: JAZMYN Hardwick  Plan of care signed (Y/N):     Date of Patient follow up with Physician:     Is this a Progress Report:     []  Yes  [x]  No        If Yes:  Date Range for reporting period:  Beginning  Ending    Progress report will be due (10 Rx or 30 days whichever is less):        Recertification will be due (POC Duration  / 90 days whichever is less): 21     Date of Surgery:        Visit # Insurance Allowable Auth Required    20 []  Yes []  No        Functional Scale:     Date assessed:        Latex Allergy:  [x]NO      []YES  Preferred Language for Healthcare:   [x]English       []other    Pain level:  0/10     SUBJECTIVE:  Patient states her back has minimal pain. Pt. C/o RLE weakness with ADL's.      Type: []Constant   []Intermitment  []Radiating []Localized  []other:     Functional Limitations: []Sitting []Standing []Walking    []Squatting []Stairs                []ADL's  []Driving []Sports/Recreation   []Lifting/reaching     []Grooming    []Carrying []Driving  []Sports/Recreations   []Other:      OBJECTIVE:     ROM Current (R) Current (L)                       Strength                           Gait:     Joint mobility:    []Normal    []Hypo   []Hyper    Palpation:     Orthopedic Tests:     RESTRICTIONS/PRECAUTIONS: R THR (posterior approach) , HBP, mobility, lifting, and ambulation. Therapeutic Activities:    [] (86881 or 15542) Provided verbal/tactile cueing for activities related to improving balance, coordination, kinesthetic sense, posture, motor skill, proprioception and motor activation to allow for proper function  with self care and ADLs  [] (46623) Provided training and instruction to the patient for proper core and proximal hip recruitment and positioning with ambulation re-education     Home Exercise Program:    [x] (07244) Reviewed/Progressed HEP activities related to strengthening, flexibility, endurance, ROM of core, proximal hip and LE for functional self-care, mobility, lifting and ambulation   [] (73650) Reviewed/Progressed HEP activities related to improving balance, coordination, kinesthetic sense, posture, motor skill, proprioception of core, proximal hip and LE for self care, mobility, lifting, and ambulation      Manual Treatments:    [x] (51074) Provided manual therapy to mobilize proximal hip and LS spine soft tissue/joints for the purpose of modulating pain, promoting relaxation,  increasing ROM, reducing/eliminating soft tissue swelling/inflammation/restriction, improving soft tissue extensibility and allowing for proper ROM for normal function with self care, mobility, lifting and ambulation. Modalities:   CP at home    Charges:  Timed Code Treatment Minutes: 45   Total Treatment Minutes: 45     [] EVAL (LOW) 31855 (typically 20 minutes face-to-face)  [] EVAL (MOD) 14934 (typically 30 minutes face-to-face)  [] EVAL (HIGH) 56275 (typically 45 minutes face-to-face)  [] RE-EVAL     [x] YZ(09722) x 1     [] IONTO  [x] NMR (48024) x     [] VASO  [x] Manual (00309) x  2   [] Other:  [] TA x      [] Mech Traction (82460)  [] ES(attended) (44772)      [] ES (un) (75465):     Goals:   Short Term Goals: To be achieved in: 2 weeks  1. Independent in HEP and progression per patient tolerance, in order to prevent re-injury.    [] Progressing: [] Met: [] Not Met: [] Adjusted  2. Patient will have a decrease in pain to facilitate improvement in movement, function, and ADLs as indicated by Functional Deficits. [] Progressing: [] Met: [] Not Met: [] Adjusted    Long Term Goals: To be achieved in: 4 weeks  1. Disability index score of 15% or less for the EMILY to assist with reaching prior level of function. [] Progressing: [] Met: [] Not Met: [] Adjusted  2. Patient will demonstrate increased AROM to WNL, good LS mobility, good hip ROM to allow for proper joint functioning as indicated by patients Functional Deficits. [] Progressing: [] Met: [] Not Met: [] Adjusted  3. Patient will demonstrate an increase in Strength to good proximal hip and core activation to allow for proper functional mobility as indicated by patients Functional Deficits. [] Progressing: [] Met: [] Not Met: [] Adjusted  4. Patient will return to changing positions/bending forward functional activities without increased symptoms or restriction. [] Progressing: [] Met: [] Not Met: [] Adjusted  5. Patient will return to walking for 20 minutes without restriction.(patient specific functional goal)   [] Progressing: [] Met: [] Not Met: [] Adjusted     Overall Progression Towards Functional goals/ Treatment Progress Update:  [] Patient is progressing as expected towards functional goals listed. [] Progression is slowed due to complexities/Impairments listed. [] Progression has been slowed due to co-morbidities. [x] Plan just implemented, too soon to assess goals progression <30days   [] Goals require adjustment due to lack of progress  [] Patient is not progressing as expected and requires additional follow up with physician  [] Other    ASSESSMENT:  Responding well to Rx and tolerated progression well. Fatigued easily with glut strengthening.      Treatment/Activity Tolerance:  [x] Patient tolerated treatment well [] Patient limited by fatique  [] Patient limited by pain  [] Patient limited by other medical complications  [] Other:     Prognosis: [x] Good [] Fair  [] Poor    Patient Requires Follow-up: [x] Yes  [] No    PLAN: See eval  [] Continue per plan of care [] Alter current plan (see comments)  [] Plan of care initiated [] Hold pending MD visit [x] Discharge        Electronically signed by: Baldev Fang, PTA  [id number], Rajni Campos, PT, MPT       Note: If patient does not return for scheduled/ recommended follow up visits, this note will serve as a discharge from care along with most recent update on progress.

## 2021-06-24 NOTE — DISCHARGE SUMMARY
[address] [place] [facility]       Physical Therapy Discharge  Date: 2021        Patient Name:  Jean Collins    :  1960  MRN: [id number]  Referring Physician:JAZMYN Haywood  Diagnosis:LBP, DDD                        ICD Code:M51.36  [] Surgical [x] Conservative  Therapy Diagnosis/Practice Pattern:LBP      Total number of visits: 6   Reporting Period:   Beginning Date:21   End Date:21    OBJECTIVE  Test used Initial score Discharge Score   Pain Summary  7/10 010   Functional questionnaire Mod EMILY 30% 10%   Functional Testing              R/L R/L   ROM Trunk flex 75% 100%    ext 50% 100%    SB 75% B 100%    ROT 75% B 100%   Strength Hip flex 5/5 5/5    Hip abd 4-/5 4+/5    Hip ext 4-/5 4+/5        Co-morbidities/Complexities (which will affect course of rehabilitation):   []None        Arthritic conditions   []Rheumatoid arthritis (M05.9)  [x]Osteoarthritis (M19.91) Cardiovascular conditions   [x]Hypertension (I10)  []Hyperlipidemia (E78.5)  []Angina pectoris (I20)  []Atherosclerosis (I70) Musculoskeletal conditions   []Disc pathology   []Congenital spine pathologies   []Prior surgical intervention  []Osteoporosis (M81.8)  []Osteopenia (M85.8)   Endocrine conditions   []Hypothyroid (E03.9)  []Hyperthyroid Gastrointestinal conditions   []Constipation (N73.34) Metabolic conditions   []Morbid obesity (E66.01)  []Diabetes type 1(E10.65) or 2 (E11.65)   []Neuropathy (G60.9)   Pulmonary conditions   []Asthma (J45)  []Coughing   []COPD (J44.9) Psychological Disorders  []Anxiety (F41.9)  [x]Depression (F32.9)   []Other: [x]Other:  R THR, Bilateral RTC repairs          Treatment to date:  [x] Therapeutic Exercise    [] Modalities:  [] Therapeutic Activity             []Ultrasound            []Electrical Stimulation  [] Gait Training     []Cervical Traction    [] Lumbar Traction  [] Neuromuscular Re-education [] Cold/hotpack         []Iontophoresis  [x] Instruction in HEP      Other:  [x] Manual Therapy                   []                                  []   Assessment:  [x] All Goals were achieved. [] The following goals were achieved (#'s):  [] The following goals were not achieved for the following reasons:  Functional/assessment of improvement as it relates to each goal: ROM, strength, and pain levels with ADL's have improved. Pt is able to walk 1 mile without increase in symptoms. Plan of Care:  [x] Discharge from Therapy Services due to:    Reason for Discharge:   [x] All goals achieved    [] Patient having surgery  [] Physician discontinued therapy  [] Insurance/Financial Limitations [] Patient did not return for follow up visits [] Home program/1 visit only   [] No subjective or objective improvement [] Plateaued   [] Patient was unable to adhere to the plan of care established at evaluation. [] Referred back to physician for re-evaluation and did not return.      [] Other:       Electronically signed by:  Trip Lara PT, JUNIE-C

## 2021-06-28 ENCOUNTER — PATIENT MESSAGE (OUTPATIENT)
Dept: FAMILY MEDICINE CLINIC | Age: 61
End: 2021-06-28

## 2021-06-29 RX ORDER — METHYLPREDNISOLONE 4 MG/1
TABLET ORAL
Qty: 21 TABLET | Refills: 0 | Status: SHIPPED | OUTPATIENT
Start: 2021-06-29 | End: 2021-08-31

## 2021-08-31 ENCOUNTER — OFFICE VISIT (OUTPATIENT)
Dept: FAMILY MEDICINE CLINIC | Age: 61
End: 2021-08-31
Payer: COMMERCIAL

## 2021-08-31 VITALS
HEART RATE: 87 BPM | WEIGHT: 235.2 LBS | HEIGHT: 66 IN | SYSTOLIC BLOOD PRESSURE: 124 MMHG | RESPIRATION RATE: 14 BRPM | OXYGEN SATURATION: 98 % | BODY MASS INDEX: 37.8 KG/M2 | DIASTOLIC BLOOD PRESSURE: 82 MMHG | TEMPERATURE: 98 F

## 2021-08-31 DIAGNOSIS — E66.9 CLASS 2 OBESITY WITHOUT SERIOUS COMORBIDITY WITH BODY MASS INDEX (BMI) OF 38.0 TO 38.9 IN ADULT, UNSPECIFIED OBESITY TYPE: ICD-10-CM

## 2021-08-31 DIAGNOSIS — E78.00 HYPERCHOLESTEREMIA: ICD-10-CM

## 2021-08-31 DIAGNOSIS — Z12.31 ENCOUNTER FOR SCREENING MAMMOGRAM FOR MALIGNANT NEOPLASM OF BREAST: ICD-10-CM

## 2021-08-31 DIAGNOSIS — G43.009 MIGRAINE WITHOUT AURA AND WITHOUT STATUS MIGRAINOSUS, NOT INTRACTABLE: ICD-10-CM

## 2021-08-31 DIAGNOSIS — M25.511 CHRONIC RIGHT SHOULDER PAIN: ICD-10-CM

## 2021-08-31 DIAGNOSIS — F33.0 MILD EPISODE OF RECURRENT MAJOR DEPRESSIVE DISORDER (HCC): ICD-10-CM

## 2021-08-31 DIAGNOSIS — R73.9 HYPERGLYCEMIA: ICD-10-CM

## 2021-08-31 DIAGNOSIS — G89.29 CHRONIC RIGHT SHOULDER PAIN: ICD-10-CM

## 2021-08-31 DIAGNOSIS — R03.0 ELEVATED BLOOD PRESSURE READING: ICD-10-CM

## 2021-08-31 DIAGNOSIS — I10 ESSENTIAL HYPERTENSION: Primary | ICD-10-CM

## 2021-08-31 DIAGNOSIS — E03.9 HYPOTHYROIDISM, UNSPECIFIED TYPE: ICD-10-CM

## 2021-08-31 PROCEDURE — 99214 OFFICE O/P EST MOD 30 MIN: CPT | Performed by: FAMILY MEDICINE

## 2021-08-31 PROCEDURE — G8417 CALC BMI ABV UP PARAM F/U: HCPCS | Performed by: FAMILY MEDICINE

## 2021-08-31 PROCEDURE — G8427 DOCREV CUR MEDS BY ELIG CLIN: HCPCS | Performed by: FAMILY MEDICINE

## 2021-08-31 PROCEDURE — 1036F TOBACCO NON-USER: CPT | Performed by: FAMILY MEDICINE

## 2021-08-31 PROCEDURE — 3017F COLORECTAL CA SCREEN DOC REV: CPT | Performed by: FAMILY MEDICINE

## 2021-08-31 RX ORDER — ATORVASTATIN CALCIUM 20 MG/1
TABLET, FILM COATED ORAL
Qty: 90 TABLET | Refills: 1 | Status: SHIPPED | OUTPATIENT
Start: 2021-08-31

## 2021-08-31 RX ORDER — POTASSIUM CHLORIDE 750 MG/1
TABLET, FILM COATED, EXTENDED RELEASE ORAL
Qty: 180 TABLET | Refills: 1 | Status: SHIPPED | OUTPATIENT
Start: 2021-08-31

## 2021-08-31 RX ORDER — FUROSEMIDE 20 MG/1
TABLET ORAL
Qty: 180 TABLET | Refills: 1 | Status: SHIPPED | OUTPATIENT
Start: 2021-08-31

## 2021-08-31 RX ORDER — SUMATRIPTAN 100 MG/1
TABLET, FILM COATED ORAL
Qty: 27 TABLET | Refills: 1 | Status: SHIPPED | OUTPATIENT
Start: 2021-08-31

## 2021-08-31 RX ORDER — AMLODIPINE BESYLATE 5 MG/1
5 TABLET ORAL DAILY
Qty: 30 TABLET | Refills: 1 | Status: SHIPPED | OUTPATIENT
Start: 2021-08-31 | End: 2021-08-31 | Stop reason: SDUPTHER

## 2021-08-31 RX ORDER — MONTELUKAST SODIUM 10 MG/1
10 TABLET ORAL DAILY
Qty: 90 TABLET | Refills: 1 | Status: SHIPPED | OUTPATIENT
Start: 2021-08-31

## 2021-08-31 RX ORDER — AMLODIPINE BESYLATE 5 MG/1
5 TABLET ORAL DAILY
Qty: 90 TABLET | Refills: 1 | Status: SHIPPED | OUTPATIENT
Start: 2021-08-31 | End: 2021-11-16

## 2021-08-31 RX ORDER — LEVOTHYROXINE SODIUM 0.05 MG/1
TABLET ORAL
Qty: 90 TABLET | Refills: 1 | Status: SHIPPED | OUTPATIENT
Start: 2021-08-31

## 2021-08-31 SDOH — ECONOMIC STABILITY: TRANSPORTATION INSECURITY
IN THE PAST 12 MONTHS, HAS LACK OF TRANSPORTATION KEPT YOU FROM MEETINGS, WORK, OR FROM GETTING THINGS NEEDED FOR DAILY LIVING?: NO

## 2021-08-31 SDOH — ECONOMIC STABILITY: FOOD INSECURITY: WITHIN THE PAST 12 MONTHS, THE FOOD YOU BOUGHT JUST DIDN'T LAST AND YOU DIDN'T HAVE MONEY TO GET MORE.: NEVER TRUE

## 2021-08-31 SDOH — ECONOMIC STABILITY: FOOD INSECURITY: WITHIN THE PAST 12 MONTHS, YOU WORRIED THAT YOUR FOOD WOULD RUN OUT BEFORE YOU GOT MONEY TO BUY MORE.: NEVER TRUE

## 2021-08-31 SDOH — ECONOMIC STABILITY: TRANSPORTATION INSECURITY
IN THE PAST 12 MONTHS, HAS THE LACK OF TRANSPORTATION KEPT YOU FROM MEDICAL APPOINTMENTS OR FROM GETTING MEDICATIONS?: NO

## 2021-08-31 ASSESSMENT — SOCIAL DETERMINANTS OF HEALTH (SDOH): HOW HARD IS IT FOR YOU TO PAY FOR THE VERY BASICS LIKE FOOD, HOUSING, MEDICAL CARE, AND HEATING?: NOT HARD AT ALL

## 2021-08-31 NOTE — PROGRESS NOTES
Pt states that she is in the process of moving to [place] [address] to be closer to friends and family. Pt has had a diffucult few years. Pt bought a house out there and will be moving out there in the next few weeks. Pt did sell her house here very easily. Pt is a bit overwhelmed. Pt feels that she is managing. Pt was doing well with her weight but has been struggling a little bit. Pt states a few months ago had an acute flare of low back pain and did get on steroids and is concerned that it has caused a mild increase in blood sugars. Pt does check blood sugars regularly, and prior to steroid was seeing low 100's and then jumped a bit to 120-130's consistently. This AM was 131 even after fasting. Pt has struggled with lifestyle due to being busy. Here for f/u of thyroid. Pt is adherent to medication therapy, and denies any symptoms of over or undertreatment of thyroid. No palpiations, dizziness, tachycardia, skin changes, bowel changes, fatigue or lightheadedness. Pt here for follow up of blood pressure. Pt states doing great with adherence to therapy and feels well. No issues of chest pain, shortness of breath. No vision changes, headache, swelling in legs. Except as noted above in the history of present illness, the review of systems is  negative for headache, vision changes, chest pain, shortness of breath, abdominal pain, urinary sx, bowel changes. Past medical, surgical, and social history reviewed and updated  Medications and allergies reviewed and updated      O: /82   Pulse 87   Temp 98 °F (36.7 °C) (Temporal)   Resp 14   Ht 5' 5.55\" (1.665 m)   Wt 235 lb 3.2 oz (106.7 kg)   LMP 08/26/2019   SpO2 98%   BMI 38.48 kg/m²   GEN: No acute distress, cooperative, well nourished, alert. HEENT: PEERLA, EOMI , normocephalic/atraumatic, nares and oropharynx clear. Mucous membranes normal, Tympanic membranes clear bilaterally.   Neck: soft, supple, no thyromegaly, mass, no Lymphadenopathy  CV: Regular rate and rhythm, no murmur, rubs, gallops. No edema. Resp: Clear to auscultation bilaterally good air entry bilaterally  No crackles, wheeze. Breathing comfortably. Psych: mood stable, No suicidal thoughts or ideation   Ext: no clubbing, cyanosis, edema  R foot hallux with mild erythema medial base w/o discharge        Current Outpatient Medications   Medication Sig Dispense Refill    SUMAtriptan (IMITREX) 100 MG tablet TAKE ONE TABLET BY MOUTH AT ONSET OF HEADACHE; MAY REPEAT ONE TABLET IN 2 HOURS IF NEEDED. MAX 2 TABLETS IN 24 HOURS. 27 tablet 1    levothyroxine (SYNTHROID) 50 MCG tablet TAKE 1 TABLET BY MOUTH EVERY DAY 90 tablet 1    furosemide (LASIX) 20 MG tablet TAKE 1 TABLET BY MOUTH TWICE A  tablet 1    montelukast (SINGULAIR) 10 MG tablet Take 1 tablet by mouth daily 90 tablet 1    potassium chloride (KLOR-CON 10) 10 MEQ extended release tablet TAKE 1 TABLET BY MOUTH TWICE A  tablet 1    atorvastatin (LIPITOR) 20 MG tablet TAKE 1 TABLET BY MOUTH EVERY DAY 90 tablet 1    amLODIPine (NORVASC) 5 MG tablet Take 1 tablet by mouth daily 90 tablet 1    ibuprofen (ADVIL;MOTRIN) 800 MG tablet Take 800 mg by mouth 2 times daily      albuterol sulfate HFA (PROVENTIL HFA) 108 (90 Base) MCG/ACT inhaler Inhale 2 puffs into the lungs every 6 hours as needed for Wheezing 1 Inhaler 11    aspirin 81 MG chewable tablet Take 1 tablet by mouth 2 times daily 60 tablet 0    Famotidine-Ca Carb-Mag Hydrox (PEPCID COMPLETE) -165 MG CHEW Take by mouth daily as needed      acetaminophen (TYLENOL) 650 MG extended release tablet Take 1,300 mg by mouth 2 times daily as needed       clotrimazole-betamethasone (LOTRISONE) 1-0.05 % cream Apply topically 2 times daily. 45 g 0    NONFORMULARY Take 1 tablet by mouth nightly as needed Indications: Hylands leg cramps       No current facility-administered medications for this visit. ASSESSMENT / PLAN:    1.  Migraine without aura and without status migrainosus, not intractable  Stable w/o flare  Cont imitrex prn  refills given   - SUMAtriptan (IMITREX) 100 MG tablet; TAKE ONE TABLET BY MOUTH AT ONSET OF HEADACHE; MAY REPEAT ONE TABLET IN 2 HOURS IF NEEDED. MAX 2 TABLETS IN 24 HOURS. Dispense: 27 tablet; Refill: 1    2. Hypercholesteremia  Check cmp, lipids  Cont lipitor  - atorvastatin (LIPITOR) 20 MG tablet; TAKE 1 TABLET BY MOUTH EVERY DAY  Dispense: 90 tablet; Refill: 1  - Lipid Panel; Future  - Comprehensive Metabolic Panel; Future    3. Essential hypertension  blood pressure stable @ goal  - CBC; Future    4. Hypothyroidism, unspecified type  - TSH without Reflex; Future  - T4, Free; Future    5. Hyperglycemia  Mild increase in blood sugars related to recent usage of steroids  Check f/u bloodwork for cmp, a1c  Management pending results. Consider restart of metformin  - Hemoglobin A1C; Future    6. Mild episode of recurrent major depressive disorder (HCC)  Mood doing well despite stressors    7. Encounter for screening mammogram for malignant neoplasm of breast  Will set up after relocation to [facility]    8. Elevated blood pressure reading  blood pressure stable    9. Class 2 obesity without serious comorbidity with body mass index (BMI) of 38.0 to 38.9 in adult, unspecified obesity type  Cont lifestyle mgt    10. Chronic right shoulder pain  Did get cortisone injection that did help, but with some persistent decrease in range of motion and pain  Consider formal physical therapy vs MRI  Will wait to get established with PCP in [address] [place] [address]           Follow-up appointment:   Pending bloodwork results    Discussed use, benefit, and side effects of all prescribed medications. Barriers to medication compliance addressed. All patient questions answered. Pt voiced understanding. When applicable, patient's outside records were reviewed through [facility]. The patient has signed appropriate paperworks/consents.

## 2021-10-15 ENCOUNTER — CLINICAL DOCUMENTATION (OUTPATIENT)
Dept: OTHER | Age: 61
End: 2021-10-15

## 2021-11-16 RX ORDER — AMLODIPINE BESYLATE 5 MG/1
TABLET ORAL
Qty: 30 TABLET | Refills: 5 | Status: SHIPPED | OUTPATIENT
Start: 2021-11-16

## 2021-11-16 NOTE — TELEPHONE ENCOUNTER
Requested Prescriptions     Pending Prescriptions Disp Refills    amLODIPine (NORVASC) 5 MG tablet [Pharmacy Med Name: AMLODIPINE BESYLATE 5 MG TAB] 30 tablet      Sig: TAKE 1 TABLET BY MOUTH EVERY DAY       Lov 8/31/2021  No f/u  Labs 8/31/21

## 2022-01-20 ENCOUNTER — TELEPHONE (OUTPATIENT)
Dept: ORTHOPEDIC SURGERY | Age: 62
End: 2022-01-20

## 2022-06-24 ENCOUNTER — TELEPHONE (OUTPATIENT)
Dept: FAMILY MEDICINE CLINIC | Age: 62
End: 2022-06-24

## 2022-06-24 NOTE — TELEPHONE ENCOUNTER
Request to Transfer PHI rec'd 6/24/22 - to Dr Angela Mckeon into [facility] and placed in Dr Leonard Bowen' basket. Document attached.

## (undated) DEVICE — GOWN,SIRUS,POLYRNF,BRTHSLV,XL,30/CS: Brand: MEDLINE

## (undated) DEVICE — GARMENT,MEDLINE,DVT,INT,CALF,MED, GEN2: Brand: MEDLINE

## (undated) DEVICE — 3M™ IOBAN™ 2 ANTIMICROBIAL INCISE DRAPE 6650EZ: Brand: IOBAN™ 2

## (undated) DEVICE — 3M™ STERI-DRAPE™ INSTRUMENT POUCH 1018: Brand: STERI-DRAPE™

## (undated) DEVICE — SOLUTION IV 1000ML 0.9% SOD CHL

## (undated) DEVICE — SYRINGE MED 30ML STD CLR PLAS LUERLOCK TIP N CTRL DISP

## (undated) DEVICE — STERILE PVP: Brand: MEDLINE INDUSTRIES, INC.

## (undated) DEVICE — SOLUTION IV IRRIG WATER 1000ML POUR BRL 2F7114

## (undated) DEVICE — DECANTER BAG 9": Brand: MEDLINE INDUSTRIES, INC.

## (undated) DEVICE — SUTURE MCRYL SZ 3 0 L18IN ABSRB UD PS 2 3 8 CIR REV CUT NDL MCP497G

## (undated) DEVICE — TRANSPORTER SUTURE RETRIEVER, STERILE: Brand: TRANSPORTER

## (undated) DEVICE — BLANKET WRM W29.9XL79.1IN UP BODY FORC AIR MISTRAL-AIR

## (undated) DEVICE — SYSTEM SKIN CLSR 22CM DERMBND PRINEO

## (undated) DEVICE — MARKER SURG SKIN UTIL BLK REG TIP NONSMEARING W/ 6IN RUL

## (undated) DEVICE — PACK PROCEDURE SURG TOT HIP

## (undated) DEVICE — PLATE ES AD W 9FT CRD 2

## (undated) DEVICE — SUTURE ETHBND EXCEL SZ 2 L30IN NONABSORBABLE GRN L40MM V-37 MX69G

## (undated) DEVICE — SUTURE VCRL + SZ 4-0 L18IN ABSRB UD L19MM PS-2 3/8 CIR PRIM VCP496H

## (undated) DEVICE — SUTURE VCRL SZ 1 L18IN ABSRB VLT CT-1 L36MM 1/2 CIR J741D

## (undated) DEVICE — OPTIFOAM GENTLE SA, POSTOP, 4X12: Brand: MEDLINE

## (undated) DEVICE — PILLOW POS W15XH6XL22IN RASPBERRY FOAM ABD W/ STRP DISP FOR

## (undated) DEVICE — PEEL-AWAY HOOD: Brand: FLYTE, SURGICOOL

## (undated) DEVICE — SYRINGE IRRIG 60ML SFT PLIABLE BLB EZ TO GRP 1 HND USE W/

## (undated) DEVICE — JEWISH HOSPITAL TURNOVER KIT: Brand: MEDLINE INDUSTRIES, INC.

## (undated) DEVICE — COAXIAL HIGH FLOW TIP WITH SOFT SHIELD

## (undated) DEVICE — COVER LT HNDL BLU PLAS

## (undated) DEVICE — DRILL BIT 2.0MM (5/64'') X 128.0MM

## (undated) DEVICE — SURE SET-DOUBLE BASIN-LF: Brand: MEDLINE INDUSTRIES, INC.

## (undated) DEVICE — Z INACTIVE NO SUPPLIER SOLUTIONIRRIG 3000ML 0.9% SOD CHL FLX CONT [79720808] [HOSPIRA WORLDWIDE INC]

## (undated) DEVICE — BIPOLAR SEALER 23-112-1 AQM 6.0: Brand: AQUAMANTYS ®

## (undated) DEVICE — YANKAUER,OPEN TIP,W/O VENT,STERILE: Brand: MEDLINE INDUSTRIES, INC.

## (undated) DEVICE — NEEDLE SPNL L3.5IN PNK HUB S STL REG WALL FIT STYL W/ QNCKE

## (undated) DEVICE — APPLICATOR PREP 26ML 0.7% IOD POVACRYLEX 74% ISO ALC ST

## (undated) DEVICE — POSITIONER HIP COVERSET UPR AND ANTR PELV SUPP HIP GRP

## (undated) DEVICE — SUTURE VCRL SZ 0 L18IN ABSRB UD L36MM CT-1 1/2 CIR J840D

## (undated) DEVICE — COUNTER NDL 40 COUNT HLD 70 NUM FOAM BLK SGL MAG W BLDE REMV

## (undated) DEVICE — PROTECTOR ULN NRV PUR FOAM HK LOOP STRP ANATOMICALLY

## (undated) DEVICE — SUTURE ETHBND EXCEL SZ 5 L30IN NONABSORBABLE GRN L40MM V-37 MB66G

## (undated) DEVICE — ORTHO PRE OP PACK: Brand: MEDLINE INDUSTRIES, INC.

## (undated) DEVICE — SUTURE VCRL SZ 2-0 L18IN ABSRB UD CT-1 L36MM 1/2 CIR J839D

## (undated) DEVICE — UNDERGLOVE SURG SZ 8 BLU LTX FREE SYN POLYISOPRENE POLYMER

## (undated) DEVICE — CONTAINER,SPECIMEN,PNEU TUBE,3OZ,OR STRL: Brand: MEDLINE

## (undated) DEVICE — 3M™ STERI-DRAPE™ U-DRAPE 1015: Brand: STERI-DRAPE™

## (undated) DEVICE — SPONGE,LAP,18"X18",DLX,XR,ST,5/PK,40/PK: Brand: MEDLINE

## (undated) DEVICE — SUTURE STRATAFIX SPRL SZ 1 L14IN ABSRB VLT L48CM CTX 1/2 SXPD2B405

## (undated) DEVICE — SUTURE VCRL + SZ 2-0 L18IN ABSRB UD CT1 L36MM 1/2 CIR VCP839D

## (undated) DEVICE — DECANTER: Brand: UNBRANDED

## (undated) DEVICE — HANDPIECE SUCTION TUBING INTERPULSE 10FT